# Patient Record
Sex: FEMALE | Race: BLACK OR AFRICAN AMERICAN | NOT HISPANIC OR LATINO | URBAN - METROPOLITAN AREA
[De-identification: names, ages, dates, MRNs, and addresses within clinical notes are randomized per-mention and may not be internally consistent; named-entity substitution may affect disease eponyms.]

---

## 2019-07-15 ENCOUNTER — OUTPATIENT (OUTPATIENT)
Dept: OUTPATIENT SERVICES | Facility: HOSPITAL | Age: 64
LOS: 1 days | Discharge: HOME | End: 2019-07-15
Payer: COMMERCIAL

## 2019-07-15 DIAGNOSIS — Z90.710 ACQUIRED ABSENCE OF BOTH CERVIX AND UTERUS: Chronic | ICD-10-CM

## 2019-07-15 LAB
ANION GAP SERPL CALC-SCNC: 10 MMO/L — SIGNIFICANT CHANGE UP (ref 7–14)
BUN SERPL-MCNC: 11 MG/DL — SIGNIFICANT CHANGE UP (ref 7–23)
CALCIUM SERPL-MCNC: 9.4 MG/DL — SIGNIFICANT CHANGE UP (ref 8.4–10.5)
CHLORIDE SERPL-SCNC: 102 MMOL/L — SIGNIFICANT CHANGE UP (ref 98–107)
CO2 SERPL-SCNC: 28 MMOL/L — SIGNIFICANT CHANGE UP (ref 22–31)
CREAT SERPL-MCNC: 0.71 MG/DL — SIGNIFICANT CHANGE UP (ref 0.5–1.3)
GLUCOSE SERPL-MCNC: 99 MG/DL — SIGNIFICANT CHANGE UP (ref 70–99)
HBA1C BLD-MCNC: 5.5 % — SIGNIFICANT CHANGE UP (ref 4–5.6)
HCT VFR BLD CALC: 39.8 % — SIGNIFICANT CHANGE UP (ref 34.5–45)
HGB BLD-MCNC: 13.2 G/DL — SIGNIFICANT CHANGE UP (ref 11.5–15.5)
MCHC RBC-ENTMCNC: 30.4 PG — SIGNIFICANT CHANGE UP (ref 27–34)
MCHC RBC-ENTMCNC: 33.2 % — SIGNIFICANT CHANGE UP (ref 32–36)
MCV RBC AUTO: 91.7 FL — SIGNIFICANT CHANGE UP (ref 80–100)
NRBC # FLD: 0 K/UL — SIGNIFICANT CHANGE UP (ref 0–0)
PLATELET # BLD AUTO: 249 K/UL — SIGNIFICANT CHANGE UP (ref 150–400)
PMV BLD: 11.1 FL — SIGNIFICANT CHANGE UP (ref 7–13)
POTASSIUM SERPL-MCNC: 3.8 MMOL/L — SIGNIFICANT CHANGE UP (ref 3.5–5.3)
POTASSIUM SERPL-SCNC: 3.8 MMOL/L — SIGNIFICANT CHANGE UP (ref 3.5–5.3)
RBC # BLD: 4.34 M/UL — SIGNIFICANT CHANGE UP (ref 3.8–5.2)
RBC # FLD: 12.8 % — SIGNIFICANT CHANGE UP (ref 10.3–14.5)
SODIUM SERPL-SCNC: 140 MMOL/L — SIGNIFICANT CHANGE UP (ref 135–145)
WBC # BLD: 7.87 K/UL — SIGNIFICANT CHANGE UP (ref 3.8–10.5)
WBC # FLD AUTO: 7.87 K/UL — SIGNIFICANT CHANGE UP (ref 3.8–10.5)

## 2019-07-15 PROCEDURE — 93010 ELECTROCARDIOGRAM REPORT: CPT

## 2019-07-15 RX ORDER — APIXABAN 2.5 MG/1
5 TABLET, FILM COATED ORAL EVERY 12 HOURS
Refills: 0 | Status: DISCONTINUED | OUTPATIENT
Start: 2019-07-15 | End: 2019-07-30

## 2019-07-15 RX ORDER — SODIUM CHLORIDE 9 MG/ML
3 INJECTION INTRAMUSCULAR; INTRAVENOUS; SUBCUTANEOUS EVERY 8 HOURS
Refills: 0 | Status: DISCONTINUED | OUTPATIENT
Start: 2019-07-15 | End: 2019-07-30

## 2019-07-15 RX ORDER — ASPIRIN/CALCIUM CARB/MAGNESIUM 324 MG
324 TABLET ORAL ONCE
Refills: 0 | Status: COMPLETED | OUTPATIENT
Start: 2019-07-15 | End: 2019-07-15

## 2019-07-15 RX ADMIN — APIXABAN 5 MILLIGRAM(S): 2.5 TABLET, FILM COATED ORAL at 14:56

## 2019-07-15 RX ADMIN — Medication 324 MILLIGRAM(S): at 09:36

## 2019-07-15 NOTE — H&P CARDIOLOGY - COMMENTS
will give chewable ASA 324mg po x1 as patient does not take regularly will give chewable ASA 324mg po x1 as patient does not take regularly and has been off Eliquis since Thursday in anticipation for cath

## 2019-07-15 NOTE — H&P CARDIOLOGY - HISTORY OF PRESENT ILLNESS
63 year old female with GERD, diagnosed PE June 2019 at [facility] (on Eliquis) who presented to her PMD for routine follow up and referred to a Cardiologist. Admits to   Denies chest pain, dizziness, syncope, edema, orthopnea and PND. Admits to increase in cough, with white sputum, denies fever, chills, sick contacts. Underwent a Stress test with reported inferoseptal ischemia.   In light of patients cardiac risk factors and abnormal noninvasive test findings there is high suspicion for CAD. Patient is now referred to [facility] for a cardiac catheterization with possible PTCA/stent. 63 year old hatian creole speaking female with GERD, diagnosed PE June 2019 at [facility] (on Eliquis) who presented to her PMD for routine follow up and referred to a Cardiologist. Admits to chronic cough with white sputum. Denies fever, chills, sick contacts. Underwent a Stress test with reported inferoseptal ischemia.   Denies chest pain, SOB, dizziness, syncope, edema, orthopnea and PND.   In light of patients cardiac risk factors and abnormal noninvasive test findings there is high suspicion for CAD. Patient is now referred to [facility] for a cardiac catheterization with possible PTCA/stent.

## 2019-07-15 NOTE — H&P CARDIOLOGY - PMH
Asthma    GERD (gastroesophageal reflux disease)    Pulmonary embolism  diagnosed June 12, 2019 at [facility] and started on Eliquis

## 2019-07-15 NOTE — CHART NOTE - NSCHARTNOTEFT_GEN_A_CORE
case reviewed with Dr Mckinley and plan for dose of Eliquis 5mg po x1 prior to discharge. TO be given after RRB removed and site stable without bleed.   Patient will continue with BID dosing as prescribed and follow up with her outpatient cardiologist as directed or sooner if needed

## 2019-07-16 RX ORDER — PYRIDOXINE HCL (VITAMIN B6) 100 MG
0 TABLET ORAL
Qty: 0 | Refills: 0 | DISCHARGE

## 2019-07-16 RX ORDER — BUDESONIDE AND FORMOTEROL FUMARATE DIHYDRATE 160; 4.5 UG/1; UG/1
2 AEROSOL RESPIRATORY (INHALATION)
Qty: 0 | Refills: 0 | DISCHARGE

## 2019-07-16 RX ORDER — OMEPRAZOLE 10 MG/1
1 CAPSULE, DELAYED RELEASE ORAL
Qty: 0 | Refills: 0 | DISCHARGE

## 2019-07-16 RX ORDER — PREGABALIN 225 MG/1
1 CAPSULE ORAL
Qty: 0 | Refills: 0 | DISCHARGE

## 2019-07-16 RX ORDER — APIXABAN 2.5 MG/1
1 TABLET, FILM COATED ORAL
Qty: 0 | Refills: 0 | DISCHARGE

## 2019-07-16 RX ORDER — FOLIC ACID 0.8 MG
0 TABLET ORAL
Qty: 0 | Refills: 0 | DISCHARGE

## 2019-07-16 RX ORDER — PREGABALIN 225 MG/1
0 CAPSULE ORAL
Qty: 0 | Refills: 0 | DISCHARGE

## 2019-07-16 RX ORDER — PYRIDOXINE HCL (VITAMIN B6) 100 MG
1 TABLET ORAL
Qty: 0 | Refills: 0 | DISCHARGE

## 2022-09-29 ENCOUNTER — EMERGENCY (EMERGENCY)
Facility: HOSPITAL | Age: 67
LOS: 0 days | Discharge: HOME | End: 2022-09-30
Attending: EMERGENCY MEDICINE

## 2022-09-29 VITALS
SYSTOLIC BLOOD PRESSURE: 110 MMHG | DIASTOLIC BLOOD PRESSURE: 76 MMHG | TEMPERATURE: 99 F | RESPIRATION RATE: 17 BRPM | OXYGEN SATURATION: 97 % | WEIGHT: 136.03 LBS | HEART RATE: 77 BPM

## 2022-09-29 DIAGNOSIS — M25.551 PAIN IN RIGHT HIP: ICD-10-CM

## 2022-09-29 PROCEDURE — 73502 X-RAY EXAM HIP UNI 2-3 VIEWS: CPT | Mod: 26,RT

## 2022-09-29 PROCEDURE — 93971 EXTREMITY STUDY: CPT | Mod: 26,RT

## 2022-09-29 PROCEDURE — 73562 X-RAY EXAM OF KNEE 3: CPT | Mod: 26,RT

## 2022-09-29 PROCEDURE — 99284 EMERGENCY DEPT VISIT MOD MDM: CPT

## 2022-09-29 RX ORDER — KETOROLAC TROMETHAMINE 30 MG/ML
15 SYRINGE (ML) INJECTION ONCE
Refills: 0 | Status: DISCONTINUED | OUTPATIENT
Start: 2022-09-29 | End: 2022-09-29

## 2022-09-29 RX ADMIN — Medication 15 MILLIGRAM(S): at 20:46

## 2022-09-29 NOTE — ED PROVIDER NOTE - CLINICAL SUMMARY MEDICAL DECISION MAKING FREE TEXT BOX
pt with hip pain on R otherwise well appearing, moving joint well after toradol - will dc home with naproxen.

## 2022-09-29 NOTE — ED PROVIDER NOTE - NSFOLLOWUPINSTRUCTIONS_ED_ALL_ED_FT
Arthritis    WHAT YOU NEED TO KNOW:    Arthritis is pain or disease in one or more joints. There are many types of arthritis. Types such as rheumatoid arthritis cause inflammation in the joints. Other types wear away the cartilage between joints, such as osteoarthritis. This makes the bones of the joint rub together when you move the joint. An infection from bacteria, a virus, or a fungus can also cause arthritis. Your symptoms may be constant, or symptoms may come and go. Arthritis often gets worse over time and can cause permanent joint damage.    DISCHARGE INSTRUCTIONS:    Call your doctor or rheumatologist if:   •You have a fever and severe joint pain or swelling.      •You cannot move the affected joint.      •You have severe joint pain you cannot tolerate.      •You have a new or worsening rash.      •Your pain or swelling does not get better with treatment.      •You have questions or concerns about your condition or care.      Medicines:   •Acetaminophen decreases pain and fever. It is available without a doctor's order. Ask how much to take and how often to take it. Follow directions. Read the labels of all other medicines you are using to see if they also contain acetaminophen, or ask your doctor or pharmacist. Acetaminophen can cause liver damage if not taken correctly.      •NSAIDs, such as ibuprofen, help decrease swelling, pain, and fever. This medicine is available with or without a doctor's order. NSAIDs can cause stomach bleeding or kidney problems in certain people. If you take blood thinner medicine, always ask your healthcare provider if NSAIDs are safe for you. Always read the medicine label and follow directions.      •Steroids reduce swelling and pain.      •Prescription pain medicine may be given. Ask your healthcare provider how to take this medicine safely. Some prescription pain medicines contain acetaminophen. Do not take other medicines that contain acetaminophen without talking to your healthcare provider. Too much acetaminophen may cause liver damage. Prescription pain medicine may cause constipation. Ask your healthcare provider how to prevent or treat constipation.       •Take your medicine as directed. Contact your healthcare provider if you think your medicine is not helping or if you have side effects. Tell your provider if you are allergic to any medicine. Keep a list of the medicines, vitamins, and herbs you take. Include the amounts, and when and why you take them. Bring the list or the pill bottles to follow-up visits. Carry your medicine list with you in case of an emergency.      Manage your symptoms:   •Rest your painful joint so it can heal. Your healthcare provider may recommend crutches or a walker if the affected joint is in a leg.      •Apply ice or heat to the joint. Both can help decrease swelling and pain. Ice may also help prevent tissue damage. Use an ice pack, or put crushed ice in a plastic bag. Cover it with a towel and place it on your joint for 15 to 20 minutes every hour or as directed. You can apply heat for 20 minutes every 2 hours. Heat treatment includes hot packs or heat lamps.      •Elevate your joint. Elevation helps reduce swelling and pain. Raise your joint above the level of your heart as often as you can. Prop your painful joint on pillows to keep it above your heart comfortably.  Elevate Leg           Manage arthritis:   •Talk to your healthcare providers about your arthritis medicines. Some medicines may only be needed when you have arthritis pain. You may need to take other medicines every day to prevent arthritis from getting worse. Your healthcare providers will help you understand all your medicines and when to take them. It is important to take the medicines as directed, even if you start to feel better. You can continue to have joint damage and inflammation even if you do not feel it.      •Eat a variety of healthy foods. Healthy foods include fruits, vegetables, whole-grain breads, low-fat dairy products, beans, lean meats, and fish. Ask if you need to be on a special diet. A diet rich in calcium and vitamin D may decrease your risk of osteoporosis. Foods high in calcium include milk, cheese, broccoli, and tofu. Vitamin D may be found in meat, fish, fortified milk, cereal and bread. Ask if you need calcium or vitamin D supplements.         Healthy Foods           •Go to physical or occupational therapy as directed. A physical therapist can teach you exercises to improve flexibility and range of motion. You may also be shown non-weight-bearing exercises that are safe for your joints, such as swimming. Exercise can help keep your joints flexible and reduce pain. An occupational therapist can help you learn to do your daily activities when your joints are stiff or sore.      •Maintain a healthy weight. Extra weight puts increased pressure on your joints. Ask your healthcare provider what you should weigh. If you need to lose weight, he or she can help you create a weight loss program. Weight loss can help reduce pain and increase your ability to do your activities.      •Wear flat or low-heeled shoes. This will help decrease pain and reduce pressure on your ankle, knee, and hip joints.      •Do not smoke. Nicotine and other chemicals in cigarettes and cigars can damage your bones and joints. Ask your healthcare provider for information if you currently smoke and need help to quit. E-cigarettes or smokeless tobacco still contain nicotine. Talk to your healthcare provider before you use these products.       Support devices:   •Orthotic shoes or insoles help support your feet when you walk.      •Crutches, a cane, or a walker may help decrease your risk for falling. They also decrease stress on affected joints.      •Devices to prevent falls include raised toilet seats and bathtub bars to help you get up from sitting. Handrails can be placed in areas where you need balance and support.  Fall Prevention for Adults           •Devices to help with support and rest include splints to wear on your hands and a firm pillow while you sleep. Use a pillow that is firm enough to support your neck and head.      Follow up with your healthcare provider or rheumatologist as directed: Write down your questions so you remember to ask them during your visits.

## 2022-09-29 NOTE — ED ADULT TRIAGE NOTE - WILL THE PATIENT ACCEPT THE PFIZER COVID-19 VACCINE IF ELIGIBLE AND IT IS AVAILABLE?
I tried calling the number twice, the extension would not connect and then I held for a few minutes to wait for a  to connect, but its taking a long time  It ok for her to use brand, pt had used before  And she prefers it over the generic  I printed her RX's and gave them to her  She gets rash and the generic falls off easily she stated  Can you please call them and let them know that, thank you! No

## 2022-09-29 NOTE — ED ADULT NURSE NOTE - OBJECTIVE STATEMENT
pt c/o right leg pain and numbness, states she has been in PT for the same and the pain is getting worse. Denies SOB/chest pain. States she was sent to PT by physical medicine doctor and hasn't been given any meds for her pain. Pt denies back pain.

## 2022-09-29 NOTE — ED ADULT TRIAGE NOTE - CHIEF COMPLAINT QUOTE
BIBA as per EMS pt c/o R leg pain and difficulty ambulating x 2 months. Seeing PCP and PT, states pain has not improved  Denies injury or trauma.

## 2022-09-29 NOTE — ED PROVIDER NOTE - PATIENT PORTAL LINK FT
You can access the FollowMyHealth Patient Portal offered by [facility] by registering at the following website: [email or web address]. By joining [employer]’s FollowMyHealth portal, you will also be able to view your health information using other applications (apps) compatible with our system.

## 2022-09-29 NOTE — ED PROVIDER NOTE - OBJECTIVE STATEMENT
67 year old female presenting to ED for r/o DVT due to pain to R hip area x 2 months. Otherwise pt denies any recent falls and has been able to ambulate on leg otherwise. No significant leg swelling noted either.

## 2022-09-29 NOTE — ED ADULT NURSE NOTE - BREATHING, MLM
What Type Of Note Output Would You Prefer (Optional)?: Bullet Format
How Severe Is Your Acne?: mild
Is This A New Presentation, Or A Follow-Up?: Follow Up Acne
Spontaneous, unlabored and symmetrical
Additional Comments (Use Complete Sentences): Patient D/C clindamycin gel due to dryness. She only takes the oral ABX(doxy) when she has large breakouts. She hasn’t Needed it in a month.

## 2022-09-29 NOTE — ED ADULT NURSE NOTE - WILL THE PATIENT ACCEPT THE PFIZER COVID-19 VACCINE IF ELIGIBLE AND IT IS AVAILABLE?
No 1/2022 post-op after right partial mastectomy; denies family h/o anesthesia issues/nausea/vomiting N/V 1/2022 post-op after right partial mastectomy; denies family h/o anesthesia issues/nausea/vomiting

## 2022-09-29 NOTE — ED PROVIDER NOTE - CARE PROVIDER_API CALL
Chandana Bah (DO)  Orthopaedic Surgery  [address] [place] [address]  Phone: [phone]  Fax: [phone]  Follow Up Time: 1-3 Days

## 2022-09-29 NOTE — ED ADULT NURSE NOTE - NSIMPLEMENTINTERV_GEN_ALL_ED
Implemented All Fall Risk Interventions:  [place] to call system. Call bell, personal items and telephone within reach. Instruct patient to call for assistance. Room bathroom lighting operational. Non-slip footwear when patient is off stretcher. Physically safe environment: no spills, clutter or unnecessary equipment. Stretcher in lowest position, wheels locked, appropriate side rails in place. Provide visual cue, wrist band, yellow gown, etc. Monitor gait and stability. Monitor for mental status changes and reorient to person, place, and time. Review medications for side effects contributing to fall risk. Reinforce activity limits and safety measures with patient and family.

## 2022-09-29 NOTE — ED PROVIDER NOTE - ATTENDING CONTRIBUTION TO CARE
Patient evaluated and seen with CHANCE Anaya agree with above history and physical - pt examined and seen by me personally - findings as seen: Pt with R hip pain otherwise xray and US neg for acute pathology, noted some arthritis - will dc with naproxen and ortho follow up as needed

## 2022-09-29 NOTE — ED ADULT TRIAGE NOTE - SOURCE OF INFORMATION
DISPLAY PLAN FREE TEXT DISPLAY PLAN FREE TEXT DISPLAY PLAN FREE TEXT DISPLAY PLAN FREE TEXT DISPLAY PLAN FREE TEXT DISPLAY PLAN FREE TEXT DISPLAY PLAN FREE TEXT DISPLAY PLAN FREE TEXT DISPLAY PLAN FREE TEXT DISPLAY PLAN FREE TEXT DISPLAY PLAN FREE TEXT DISPLAY PLAN FREE TEXT DISPLAY PLAN FREE TEXT DISPLAY PLAN FREE TEXT DISPLAY PLAN FREE TEXT DISPLAY PLAN FREE TEXT Patient/EMS DISPLAY PLAN FREE TEXT

## 2022-09-30 VITALS
RESPIRATION RATE: 13 BRPM | DIASTOLIC BLOOD PRESSURE: 79 MMHG | OXYGEN SATURATION: 99 % | SYSTOLIC BLOOD PRESSURE: 105 MMHG | HEART RATE: 81 BPM

## 2022-09-30 RX ORDER — KETOROLAC TROMETHAMINE 30 MG/ML
15 SYRINGE (ML) INJECTION ONCE
Refills: 0 | Status: DISCONTINUED | OUTPATIENT
Start: 2022-09-30 | End: 2022-09-30

## 2022-09-30 RX ADMIN — Medication 15 MILLIGRAM(S): at 00:45

## 2022-09-30 NOTE — ED ADULT NURSE REASSESSMENT NOTE - PATIENT ON (OXYGEN DELIVERY METHOD)
room air
room air
return to ED if symptoms worsen, persist or questions arise/need for outpatient follow-up/lab results

## 2022-12-07 ENCOUNTER — EMERGENCY (EMERGENCY)
Facility: HOSPITAL | Age: 67
LOS: 0 days | Discharge: HOME | End: 2022-12-07
Attending: EMERGENCY MEDICINE

## 2022-12-07 VITALS
RESPIRATION RATE: 18 BRPM | OXYGEN SATURATION: 97 % | TEMPERATURE: 98 F | HEART RATE: 69 BPM | DIASTOLIC BLOOD PRESSURE: 63 MMHG | SYSTOLIC BLOOD PRESSURE: 112 MMHG

## 2022-12-07 VITALS
DIASTOLIC BLOOD PRESSURE: 88 MMHG | WEIGHT: 113.1 LBS | HEIGHT: 60 IN | TEMPERATURE: 98 F | RESPIRATION RATE: 18 BRPM | HEART RATE: 72 BPM | SYSTOLIC BLOOD PRESSURE: 131 MMHG | OXYGEN SATURATION: 98 %

## 2022-12-07 DIAGNOSIS — M79.604 PAIN IN RIGHT LEG: ICD-10-CM

## 2022-12-07 DIAGNOSIS — Z90.710 ACQUIRED ABSENCE OF BOTH CERVIX AND UTERUS: Chronic | ICD-10-CM

## 2022-12-07 DIAGNOSIS — M51.36 OTHER INTERVERTEBRAL DISC DEGENERATION, LUMBAR REGION: ICD-10-CM

## 2022-12-07 DIAGNOSIS — M54.9 DORSALGIA, UNSPECIFIED: ICD-10-CM

## 2022-12-07 LAB
ALBUMIN SERPL ELPH-MCNC: 3.2 G/DL — LOW (ref 3.3–5)
ALP SERPL-CCNC: 95 U/L — SIGNIFICANT CHANGE UP (ref 40–120)
ALT FLD-CCNC: 30 U/L — SIGNIFICANT CHANGE UP (ref 12–78)
ANION GAP SERPL CALC-SCNC: 5 MMOL/L — SIGNIFICANT CHANGE UP (ref 5–17)
AST SERPL-CCNC: 18 U/L — SIGNIFICANT CHANGE UP (ref 15–37)
BASOPHILS # BLD AUTO: 0.06 K/UL — SIGNIFICANT CHANGE UP (ref 0–0.2)
BASOPHILS NFR BLD AUTO: 0.7 % — SIGNIFICANT CHANGE UP (ref 0–2)
BILIRUB SERPL-MCNC: 0.4 MG/DL — SIGNIFICANT CHANGE UP (ref 0.2–1.2)
BLD GP AB SCN SERPL QL: SIGNIFICANT CHANGE UP
BUN SERPL-MCNC: 13 MG/DL — SIGNIFICANT CHANGE UP (ref 7–23)
CALCIUM SERPL-MCNC: 9.3 MG/DL — SIGNIFICANT CHANGE UP (ref 8.5–10.1)
CHLORIDE SERPL-SCNC: 107 MMOL/L — SIGNIFICANT CHANGE UP (ref 96–108)
CO2 SERPL-SCNC: 29 MMOL/L — SIGNIFICANT CHANGE UP (ref 22–31)
CREAT SERPL-MCNC: 0.74 MG/DL — SIGNIFICANT CHANGE UP (ref 0.5–1.3)
EGFR: 89 ML/MIN/1.73M2 — SIGNIFICANT CHANGE UP
EOSINOPHIL # BLD AUTO: 0.27 K/UL — SIGNIFICANT CHANGE UP (ref 0–0.5)
EOSINOPHIL NFR BLD AUTO: 3.1 % — SIGNIFICANT CHANGE UP (ref 0–6)
GLUCOSE SERPL-MCNC: 131 MG/DL — HIGH (ref 70–99)
HCT VFR BLD CALC: 39 % — SIGNIFICANT CHANGE UP (ref 34.5–45)
HGB BLD-MCNC: 13.1 G/DL — SIGNIFICANT CHANGE UP (ref 11.5–15.5)
IMM GRANULOCYTES NFR BLD AUTO: 0.2 % — SIGNIFICANT CHANGE UP (ref 0–0.9)
INR BLD: 1.12 RATIO — SIGNIFICANT CHANGE UP (ref 0.88–1.16)
LYMPHOCYTES # BLD AUTO: 3.15 K/UL — SIGNIFICANT CHANGE UP (ref 1–3.3)
LYMPHOCYTES # BLD AUTO: 35.7 % — SIGNIFICANT CHANGE UP (ref 13–44)
MCHC RBC-ENTMCNC: 31.3 PG — SIGNIFICANT CHANGE UP (ref 27–34)
MCHC RBC-ENTMCNC: 33.6 G/DL — SIGNIFICANT CHANGE UP (ref 32–36)
MCV RBC AUTO: 93.1 FL — SIGNIFICANT CHANGE UP (ref 80–100)
MONOCYTES # BLD AUTO: 0.98 K/UL — HIGH (ref 0–0.9)
MONOCYTES NFR BLD AUTO: 11.1 % — SIGNIFICANT CHANGE UP (ref 2–14)
NEUTROPHILS # BLD AUTO: 4.34 K/UL — SIGNIFICANT CHANGE UP (ref 1.8–7.4)
NEUTROPHILS NFR BLD AUTO: 49.2 % — SIGNIFICANT CHANGE UP (ref 43–77)
NRBC # BLD: 0 /100 WBCS — SIGNIFICANT CHANGE UP (ref 0–0)
PLATELET # BLD AUTO: 233 K/UL — SIGNIFICANT CHANGE UP (ref 150–400)
POTASSIUM SERPL-MCNC: 3.3 MMOL/L — LOW (ref 3.5–5.3)
POTASSIUM SERPL-SCNC: 3.3 MMOL/L — LOW (ref 3.5–5.3)
PROT SERPL-MCNC: 7.4 GM/DL — SIGNIFICANT CHANGE UP (ref 6–8.3)
PROTHROM AB SERPL-ACNC: 13.5 SEC — HIGH (ref 10.5–13.4)
RBC # BLD: 4.19 M/UL — SIGNIFICANT CHANGE UP (ref 3.8–5.2)
RBC # FLD: 12.9 % — SIGNIFICANT CHANGE UP (ref 10.3–14.5)
SODIUM SERPL-SCNC: 141 MMOL/L — SIGNIFICANT CHANGE UP (ref 135–145)
WBC # BLD: 8.82 K/UL — SIGNIFICANT CHANGE UP (ref 3.8–10.5)
WBC # FLD AUTO: 8.82 K/UL — SIGNIFICANT CHANGE UP (ref 3.8–10.5)

## 2022-12-07 PROCEDURE — 72100 X-RAY EXAM L-S SPINE 2/3 VWS: CPT | Mod: 26

## 2022-12-07 PROCEDURE — 72131 CT LUMBAR SPINE W/O DYE: CPT | Mod: 26,MA

## 2022-12-07 PROCEDURE — 99285 EMERGENCY DEPT VISIT HI MDM: CPT

## 2022-12-07 PROCEDURE — 72148 MRI LUMBAR SPINE W/O DYE: CPT | Mod: 26,MA

## 2022-12-07 RX ORDER — OXYCODONE HYDROCHLORIDE 5 MG/1
1 TABLET ORAL
Qty: 20 | Refills: 0
Start: 2022-12-07 | End: 2022-12-11

## 2022-12-07 RX ORDER — GABAPENTIN 400 MG/1
1 CAPSULE ORAL
Qty: 90 | Refills: 0
Start: 2022-12-07 | End: 2023-01-05

## 2022-12-07 RX ORDER — METHOCARBAMOL 500 MG/1
1000 TABLET, FILM COATED ORAL ONCE
Refills: 0 | Status: COMPLETED | OUTPATIENT
Start: 2022-12-07 | End: 2022-12-07

## 2022-12-07 RX ORDER — KETOROLAC TROMETHAMINE 30 MG/ML
60 SYRINGE (ML) INJECTION ONCE
Refills: 0 | Status: DISCONTINUED | OUTPATIENT
Start: 2022-12-07 | End: 2022-12-07

## 2022-12-07 RX ORDER — MORPHINE SULFATE 50 MG/1
4 CAPSULE, EXTENDED RELEASE ORAL ONCE
Refills: 0 | Status: DISCONTINUED | OUTPATIENT
Start: 2022-12-07 | End: 2022-12-07

## 2022-12-07 RX ADMIN — MORPHINE SULFATE 4 MILLIGRAM(S): 50 CAPSULE, EXTENDED RELEASE ORAL at 16:59

## 2022-12-07 RX ADMIN — MORPHINE SULFATE 4 MILLIGRAM(S): 50 CAPSULE, EXTENDED RELEASE ORAL at 13:22

## 2022-12-07 RX ADMIN — METHOCARBAMOL 1000 MILLIGRAM(S): 500 TABLET, FILM COATED ORAL at 09:26

## 2022-12-07 RX ADMIN — Medication 60 MILLIGRAM(S): at 09:26

## 2022-12-07 RX ADMIN — Medication 60 MILLIGRAM(S): at 16:59

## 2022-12-07 NOTE — ED PROVIDER NOTE - PATIENT PORTAL LINK FT
You can access the FollowMyHealth Patient Portal offered by [facility] by registering at the following website: [email or web address]. By joining [employer]’s FollowMyHealth portal, you will also be able to view your health information using other applications (apps) compatible with our system.

## 2022-12-07 NOTE — ED ADULT NURSE NOTE - OBJECTIVE STATEMENT
Patient BIBA [facility] c/o pain to right hip/ buttocks that radiates down right leg. Denies injury. Denies loss of bladder and bowel function. No unilateral weakness. Pmh sciatica. asthma.

## 2022-12-07 NOTE — ED PROVIDER NOTE - CLINICAL SUMMARY MEDICAL DECISION MAKING FREE TEXT BOX
Leg and back pain with weakness and numbness.  Concern for herniated disc and radiculopathy.  Will give medication for pain, check Xray, CT and Re-eval.  If patient with no improvement with movement and able to stand better or ambulate after pain medication consult Ortho.

## 2022-12-07 NOTE — ED PROVIDER NOTE - SKIN, MLM
Skin normal color for race, warm, dry and intact. No evidence of rash. Skin normal color for race, warm, dry and intact. No evidence of trauma.

## 2022-12-07 NOTE — ED PROVIDER NOTE - NSFOLLOWUPINSTRUCTIONS_ED_ALL_ED_FT
1) Take tylenol or motrin/ibuprofen for pain  2) Take prescription medication as instructed  3) Follow-up with Ortho spine.  Call for an appointment  4) Follow up with your primary care doctor  5) Return to the ER for worsening or concerning symptoms

## 2022-12-07 NOTE — ED PROVIDER NOTE - CARE PLAN
1 Principal Discharge DX:	Back pain   Principal Discharge DX:	Back pain  Secondary Diagnosis:	Pain in right leg

## 2022-12-07 NOTE — CONSULT NOTE ADULT - SUBJECTIVE AND OBJECTIVE BOX
Patient is a 67y Female who presents c/o RLE radicular pain. Pt reports the pain started suddenly in July. Denies trauma or any injury. Reports increasing difficulty to ambulate 2/2 pain. Pt normally ambulates without assistance at baseline. Reports numbness/tingling/weakness in RLE. Denies bowel/bladder incontinence. Denies cancer history. Denies fevers/chills. Pt reports trying physical therapy initially which did not help and being seen by Dr. Clark Tariq 2 weeks ago who ordered lumbar xrays and an MRI which was scheduled for next week. Pt has no other complaints at this time.    HEALTH ISSUES - PROBLEM Dx:          MEDICATIONS  (STANDING):      Allergies    No Known Allergies    Intolerances        PAST MEDICAL & SURGICAL HISTORY:                            13.1   8.82  )-----------( 233      ( 07 Dec 2022 13:15 )             39.0       07 Dec 2022 13:15    141    |  107    |  13     ----------------------------<  131    3.3     |  29     |  0.74     Ca    9.3        07 Dec 2022 13:15    TPro  7.4    /  Alb  3.2    /  TBili  0.4    /  DBili  x      /  AST  18     /  ALT  30     /  AlkPhos  95     07 Dec 2022 13:15      PT/INR - ( 07 Dec 2022 13:15 )   PT: 13.5 sec;   INR: 1.12 ratio                 Vital Signs Last 24 Hrs  T(C): 36.8 (12-07-22 @ 16:56), Max: 36.8 (12-07-22 @ 16:56)  T(F): 98.2 (12-07-22 @ 16:56), Max: 98.2 (12-07-22 @ 16:56)  HR: 69 (12-07-22 @ 16:56) (67 - 72)  BP: 112/63 (12-07-22 @ 16:56) (105/78 - 131/88)  BP(mean): --  RR: 18 (12-07-22 @ 16:56) (18 - 18)  SpO2: 97% (12-07-22 @ 16:56) (96% - 98%)    Physical Exam:  Gen: NAD  Spine:  Skin intact  No gross deformity  No midline TTP C/T/L/S spine  No bony step offs  No paraspinal muscle ttp/hypertonicity   Positive Straight leg raise on right  Negative clonus  Negative babinski  Negative leon    Motor:                   C5                C6              C7               C8           T1   R            5/5                5/5            5/5             5/5          5/5  L             5/5               5/5             5/5             5/5          5/5                L2             L3             L4               L5            S1  R         5/5           5/5          5/5             5/5           5/5  L          5/5          5/5           5/5             5/5           5/5    Sensory:            C5         C6         C7      C8       T1        (0=absent, 1=impaired, 2=normal, NT=not testable)  R         2            2           2        2         2  L          2            2           2        2         2               L2          L3         L4      L5       S1         (0=absent, 1=impaired, 2=normal, NT=not testable)  R         1            1            1        1        1  L          2            2           2        2         2    Imaging:   MR Lsp: moderate central canal stenosis at L4-S1    A/P: 67y Female with RLE radicular back pain 2/2 moderate central canal stenosis L4-S1    Pt was advised of MRI findings and offered surgical treatment to relieve her symptoms  Operative and nonoperative treatments were discussed with the patient and her  on the phone and all questions were answered  Patient decided that she would like to have some time to discuss with family and does not want to make a decision today  Pt can follow up outpatient with Dr. Ernst to further discuss surgical treatment options once she has made her decision  DC on medrol dose pack, gabapentin 300mg bid  Pain control prn  WBAT with assistive devices as needed  Will discuss with Dr. Ernst and advise with updates to plan

## 2022-12-07 NOTE — ED ADULT NURSE NOTE - NSIMPLEMENTINTERV_GEN_ALL_ED
Implemented All Fall Risk Interventions:  [place] to call system. Call bell, personal items and telephone within reach. Instruct patient to call for assistance. Room bathroom lighting operational. Non-slip footwear when patient is off stretcher. Physically safe environment: no spills, clutter or unnecessary equipment. Stretcher in lowest position, wheels locked, appropriate side rails in place. Provide visual cue, wrist band, yellow gown, etc. Monitor gait and stability. Monitor for mental status changes and reorient to person, place, and time. Review medications for side effects contributing to fall risk. Reinforce activity limits and safety measures with patient and family.

## 2022-12-07 NOTE — ED ADULT NURSE REASSESSMENT NOTE - NS ED NURSE REASSESS COMMENT FT1
Received patient aox3 c/o rt hip pain radiates to rt buttock x 1 week worsening last night has history of Sciatica denies injury.

## 2022-12-07 NOTE — ED ADULT NURSE REASSESSMENT NOTE - NS ED NURSE REASSESS COMMENT FT1
Patient aox3 discharged home all discharge instructions given encouraged to follow up with MDs appointment , left ER ambulatory in no acute distress/

## 2022-12-07 NOTE — ED PROVIDER NOTE - PROGRESS NOTE DETAILS
Patient in severe pain unable to ambulate secondary to pain.  Ortho paged. Ortho aware. Patient was offered surgery by Ortho but declined.  Ortho team recommends medrol dosepack, gabapentin and pain meds.  Patient to follow-up outpatient.

## 2022-12-07 NOTE — ED ADULT TRIAGE NOTE - CHIEF COMPLAINT QUOTE
Patient BIB [facility] c/o pain to right hip/ buttocks that radiates down right leg. Pmh sciatica. asthma.

## 2022-12-07 NOTE — ED PROVIDER NOTE - CROS ED ROS STATEMENT
-- DO NOT REPLY / DO NOT REPLY ALL --  -- Message is from Engagement Center Operations (ECO) --    Provider paged via [place] Documentation - The below message was copied and pasted from a [place] page:    Initiated Date/Time  11/20/2022 8:12 pm  Message Sent Date/Time  11/20/2022 8:13 pm  Source  [facility] Contact Center  Department  [facility]  Phone Number  [phone]  Method  Secure Text  Contacted  Bindu Qiu MD  Requested  Catrachita Lester MD  Details  Patient Patient seen by Specialist  Message  [phone] [facility] URGENT CALLER NAME: ABBY REQUESTED PHYSICIAN: CATRACHITA LESTER RE: RAY PAEZ PATIENT 2011 MRN: [id number] PATIENT PCP: DR. LESTER IF RX, PHARMACY #: N/A MOM REQUESTED TO PAGE PROVIDER; PATIENT TESTED + FOR COVID AND HAS UNDERLYING CONDITION, MAY HAVE RESPIRATORY COMPLICATIONS. WOULD LIKE TO DISCUSS HOW HANDLE. JMONA**    Route encounter to 'Provider On-Call' clinical support pool that was paged. 'Sign and Close Workspace'   all other ROS negative except as per HPI

## 2022-12-07 NOTE — ED PROVIDER NOTE - OBJECTIVE STATEMENT
This patient is a 67 year old woman who presents to the ER c/o right sided lag and back pain x 1 week that has been getting progressively worse.  Patient states that this morning she was unable to move around and ambulate secondary to pain.  She denies urinary retention, fecal incontinence, and fever.  No falls or injury reported.

## 2023-02-11 ENCOUNTER — EMERGENCY (EMERGENCY)
Facility: HOSPITAL | Age: 68
LOS: 0 days | Discharge: HOME | End: 2023-02-11
Attending: EMERGENCY MEDICINE
Payer: MEDICARE

## 2023-02-11 VITALS
DIASTOLIC BLOOD PRESSURE: 96 MMHG | HEART RATE: 70 BPM | SYSTOLIC BLOOD PRESSURE: 146 MMHG | RESPIRATION RATE: 18 BRPM | HEIGHT: 60 IN | WEIGHT: 136.03 LBS | TEMPERATURE: 99 F | OXYGEN SATURATION: 99 %

## 2023-02-11 VITALS
DIASTOLIC BLOOD PRESSURE: 67 MMHG | HEART RATE: 72 BPM | SYSTOLIC BLOOD PRESSURE: 132 MMHG | OXYGEN SATURATION: 98 % | TEMPERATURE: 98 F | RESPIRATION RATE: 17 BRPM

## 2023-02-11 DIAGNOSIS — J45.909 UNSPECIFIED ASTHMA, UNCOMPLICATED: ICD-10-CM

## 2023-02-11 DIAGNOSIS — R25.2 CRAMP AND SPASM: ICD-10-CM

## 2023-02-11 DIAGNOSIS — R53.1 WEAKNESS: ICD-10-CM

## 2023-02-11 DIAGNOSIS — Z20.822 CONTACT WITH AND (SUSPECTED) EXPOSURE TO COVID-19: ICD-10-CM

## 2023-02-11 LAB
ALBUMIN SERPL ELPH-MCNC: 3.3 G/DL — SIGNIFICANT CHANGE UP (ref 3.3–5)
ALP SERPL-CCNC: 87 U/L — SIGNIFICANT CHANGE UP (ref 40–120)
ALT FLD-CCNC: 23 U/L — SIGNIFICANT CHANGE UP (ref 12–78)
ANION GAP SERPL CALC-SCNC: 10 MMOL/L — SIGNIFICANT CHANGE UP (ref 5–17)
APPEARANCE UR: CLEAR — SIGNIFICANT CHANGE UP
APTT BLD: 26.8 SEC — LOW (ref 27.5–35.5)
APTT BLD: 29.8 SEC — SIGNIFICANT CHANGE UP (ref 27.5–35.5)
AST SERPL-CCNC: 23 U/L — SIGNIFICANT CHANGE UP (ref 15–37)
BACTERIA # UR AUTO: NEGATIVE — SIGNIFICANT CHANGE UP
BASOPHILS # BLD AUTO: 0.05 K/UL — SIGNIFICANT CHANGE UP (ref 0–0.2)
BASOPHILS # BLD AUTO: 0.06 K/UL — SIGNIFICANT CHANGE UP (ref 0–0.2)
BASOPHILS NFR BLD AUTO: 0.6 % — SIGNIFICANT CHANGE UP (ref 0–2)
BASOPHILS NFR BLD AUTO: 0.8 % — SIGNIFICANT CHANGE UP (ref 0–2)
BILIRUB SERPL-MCNC: 0.3 MG/DL — SIGNIFICANT CHANGE UP (ref 0.2–1.2)
BILIRUB UR-MCNC: NEGATIVE — SIGNIFICANT CHANGE UP
BUN SERPL-MCNC: 9 MG/DL — SIGNIFICANT CHANGE UP (ref 7–23)
CALCIUM SERPL-MCNC: 9.2 MG/DL — SIGNIFICANT CHANGE UP (ref 8.5–10.1)
CHLORIDE SERPL-SCNC: 107 MMOL/L — SIGNIFICANT CHANGE UP (ref 96–108)
CO2 SERPL-SCNC: 26 MMOL/L — SIGNIFICANT CHANGE UP (ref 22–31)
COLOR SPEC: YELLOW — SIGNIFICANT CHANGE UP
CREAT SERPL-MCNC: 0.55 MG/DL — SIGNIFICANT CHANGE UP (ref 0.5–1.3)
DIFF PNL FLD: NEGATIVE — SIGNIFICANT CHANGE UP
EGFR: 100 ML/MIN/1.73M2 — SIGNIFICANT CHANGE UP
EOSINOPHIL # BLD AUTO: 0.22 K/UL — SIGNIFICANT CHANGE UP (ref 0–0.5)
EOSINOPHIL # BLD AUTO: 0.23 K/UL — SIGNIFICANT CHANGE UP (ref 0–0.5)
EOSINOPHIL NFR BLD AUTO: 2.8 % — SIGNIFICANT CHANGE UP (ref 0–6)
EOSINOPHIL NFR BLD AUTO: 2.9 % — SIGNIFICANT CHANGE UP (ref 0–6)
EPI CELLS # UR: SIGNIFICANT CHANGE UP
FLUAV AG NPH QL: SIGNIFICANT CHANGE UP
FLUBV AG NPH QL: SIGNIFICANT CHANGE UP
GLUCOSE SERPL-MCNC: 96 MG/DL — SIGNIFICANT CHANGE UP (ref 70–99)
GLUCOSE UR QL: NEGATIVE MG/DL — SIGNIFICANT CHANGE UP
HCT VFR BLD CALC: 37.7 % — SIGNIFICANT CHANGE UP (ref 34.5–45)
HCT VFR BLD CALC: 40 % — SIGNIFICANT CHANGE UP (ref 34.5–45)
HGB BLD-MCNC: 12.9 G/DL — SIGNIFICANT CHANGE UP (ref 11.5–15.5)
HGB BLD-MCNC: 13.4 G/DL — SIGNIFICANT CHANGE UP (ref 11.5–15.5)
IMM GRANULOCYTES NFR BLD AUTO: 0.1 % — SIGNIFICANT CHANGE UP (ref 0–0.9)
IMM GRANULOCYTES NFR BLD AUTO: 0.3 % — SIGNIFICANT CHANGE UP (ref 0–0.9)
INR BLD: 1.09 RATIO — SIGNIFICANT CHANGE UP (ref 0.88–1.16)
KETONES UR-MCNC: NEGATIVE — SIGNIFICANT CHANGE UP
LACTATE SERPL-SCNC: 1.8 MMOL/L — SIGNIFICANT CHANGE UP (ref 0.7–2)
LEUKOCYTE ESTERASE UR-ACNC: NEGATIVE — SIGNIFICANT CHANGE UP
LYMPHOCYTES # BLD AUTO: 2.47 K/UL — SIGNIFICANT CHANGE UP (ref 1–3.3)
LYMPHOCYTES # BLD AUTO: 2.61 K/UL — SIGNIFICANT CHANGE UP (ref 1–3.3)
LYMPHOCYTES # BLD AUTO: 31 % — SIGNIFICANT CHANGE UP (ref 13–44)
LYMPHOCYTES # BLD AUTO: 33.5 % — SIGNIFICANT CHANGE UP (ref 13–44)
MCHC RBC-ENTMCNC: 30.9 PG — SIGNIFICANT CHANGE UP (ref 27–34)
MCHC RBC-ENTMCNC: 31.1 PG — SIGNIFICANT CHANGE UP (ref 27–34)
MCHC RBC-ENTMCNC: 33.5 G/DL — SIGNIFICANT CHANGE UP (ref 32–36)
MCHC RBC-ENTMCNC: 34.2 G/DL — SIGNIFICANT CHANGE UP (ref 32–36)
MCV RBC AUTO: 90.2 FL — SIGNIFICANT CHANGE UP (ref 80–100)
MCV RBC AUTO: 92.8 FL — SIGNIFICANT CHANGE UP (ref 80–100)
MONOCYTES # BLD AUTO: 0.68 K/UL — SIGNIFICANT CHANGE UP (ref 0–0.9)
MONOCYTES # BLD AUTO: 0.72 K/UL — SIGNIFICANT CHANGE UP (ref 0–0.9)
MONOCYTES NFR BLD AUTO: 8.7 % — SIGNIFICANT CHANGE UP (ref 2–14)
MONOCYTES NFR BLD AUTO: 9 % — SIGNIFICANT CHANGE UP (ref 2–14)
NEUTROPHILS # BLD AUTO: 4.21 K/UL — SIGNIFICANT CHANGE UP (ref 1.8–7.4)
NEUTROPHILS # BLD AUTO: 4.48 K/UL — SIGNIFICANT CHANGE UP (ref 1.8–7.4)
NEUTROPHILS NFR BLD AUTO: 54 % — SIGNIFICANT CHANGE UP (ref 43–77)
NEUTROPHILS NFR BLD AUTO: 56.3 % — SIGNIFICANT CHANGE UP (ref 43–77)
NITRITE UR-MCNC: NEGATIVE — SIGNIFICANT CHANGE UP
NRBC # BLD: 0 /100 WBCS — SIGNIFICANT CHANGE UP (ref 0–0)
PH UR: 7 — SIGNIFICANT CHANGE UP (ref 5–8)
PLATELET # BLD AUTO: 234 K/UL — SIGNIFICANT CHANGE UP (ref 150–400)
PLATELET # BLD AUTO: 239 K/UL — SIGNIFICANT CHANGE UP (ref 150–400)
POTASSIUM SERPL-MCNC: 3.7 MMOL/L — SIGNIFICANT CHANGE UP (ref 3.5–5.3)
POTASSIUM SERPL-SCNC: 3.7 MMOL/L — SIGNIFICANT CHANGE UP (ref 3.5–5.3)
PROT SERPL-MCNC: 7.3 GM/DL — SIGNIFICANT CHANGE UP (ref 6–8.3)
PROT UR-MCNC: NEGATIVE MG/DL — SIGNIFICANT CHANGE UP
PROTHROM AB SERPL-ACNC: 13.1 SEC — SIGNIFICANT CHANGE UP (ref 10.5–13.4)
RBC # BLD: 4.18 M/UL — SIGNIFICANT CHANGE UP (ref 3.8–5.2)
RBC # BLD: 4.31 M/UL — SIGNIFICANT CHANGE UP (ref 3.8–5.2)
RBC # FLD: 12.3 % — SIGNIFICANT CHANGE UP (ref 10.3–14.5)
RBC # FLD: 12.4 % — SIGNIFICANT CHANGE UP (ref 10.3–14.5)
RBC CASTS # UR COMP ASSIST: SIGNIFICANT CHANGE UP /HPF (ref 0–4)
SARS-COV-2 RNA SPEC QL NAA+PROBE: SIGNIFICANT CHANGE UP
SODIUM SERPL-SCNC: 143 MMOL/L — SIGNIFICANT CHANGE UP (ref 135–145)
SP GR SPEC: 1 — LOW (ref 1.01–1.02)
UROBILINOGEN FLD QL: NEGATIVE MG/DL — SIGNIFICANT CHANGE UP
WBC # BLD: 7.8 K/UL — SIGNIFICANT CHANGE UP (ref 3.8–10.5)
WBC # BLD: 7.96 K/UL — SIGNIFICANT CHANGE UP (ref 3.8–10.5)
WBC # FLD AUTO: 7.8 K/UL — SIGNIFICANT CHANGE UP (ref 3.8–10.5)
WBC # FLD AUTO: 7.96 K/UL — SIGNIFICANT CHANGE UP (ref 3.8–10.5)
WBC UR QL: SIGNIFICANT CHANGE UP

## 2023-02-11 PROCEDURE — 93010 ELECTROCARDIOGRAM REPORT: CPT

## 2023-02-11 PROCEDURE — 99285 EMERGENCY DEPT VISIT HI MDM: CPT

## 2023-02-11 RX ORDER — SODIUM CHLORIDE 9 MG/ML
1000 INJECTION, SOLUTION INTRAVENOUS ONCE
Refills: 0 | Status: COMPLETED | OUTPATIENT
Start: 2023-02-11 | End: 2023-02-11

## 2023-02-11 RX ADMIN — SODIUM CHLORIDE 1000 MILLILITER(S): 9 INJECTION, SOLUTION INTRAVENOUS at 10:20

## 2023-02-11 RX ADMIN — SODIUM CHLORIDE 1000 MILLILITER(S): 9 INJECTION, SOLUTION INTRAVENOUS at 14:18

## 2023-02-11 NOTE — ED PROVIDER NOTE - CLINICAL SUMMARY MEDICAL DECISION MAKING FREE TEXT BOX
hx, exam, labs, ekg pt has been very comfortable walking in the hallway without symptoms pt is advised to follow up with pmd and return if symptoms persist or worsen.

## 2023-02-11 NOTE — ED ADULT TRIAGE NOTE - CHIEF COMPLAINT QUOTE
Patient arrived to ED for generalized body weakness for the past 2-3 days, Pt complaints cramp to R leg which is chronic. Denies any falls and dizziness. PMx: Asthma/ Gerd

## 2023-02-11 NOTE — ED ADULT NURSE REASSESSMENT NOTE - NS ED NURSE REASSESS COMMENT FT1
Patient aox4 discharged home all discharge instructions given encouraged to follow up with MDs appointment left ER ambulatory in no acute distress.

## 2023-02-11 NOTE — ED PROVIDER NOTE - PROGRESS NOTE DETAILS
Pt has been alert and oriented x 3 ambulating with normal gaits without assist pt ate and tolerated lunch. Pt denies headache, dizziness, neck/back pain, focal/distal weakness or numbness, sob, chest delia, nausea, vomiting, abd pain, Pt is given and explained all test reports and advised to follow up with  pmd and return if symptoms persist or worsen.

## 2023-02-11 NOTE — ED ADULT NURSE NOTE - NSIMPLEMENTINTERV_GEN_ALL_ED
Implemented All Fall Risk Interventions:  [place] to call system. Call bell, personal items and telephone within reach. Instruct patient to call for assistance. Room bathroom lighting operational. Non-slip footwear when patient is off stretcher. Physically safe environment: no spills, clutter or unnecessary equipment. Stretcher in lowest position, wheels locked, appropriate side rails in place. Provide visual cue, wrist band, yellow gown, etc. Monitor gait and stability. Monitor for mental status changes and reorient to person, place, and time. Review medications for side effects contributing to fall risk. Reinforce activity limits and safety measures with patient and family.

## 2023-02-11 NOTE — ED PROVIDER NOTE - PATIENT PORTAL LINK FT
You can access the FollowMyHealth Patient Portal offered by [facility] by registering at the following website: [email or web address]. By joining [employer]’s FollowMyHealth portal, you will also be able to view your health information using other applications (apps) compatible with our system.

## 2023-02-11 NOTE — ED PROVIDER NOTE - OBJECTIVE STATEMENT
67 years old female here c/o generalized weakness for 3 days and unable to sleep with right leg cramps. Pt denies recent hx of trauma, traveling, headache, dizziness, blurred visions, light sensitivities, focal/distal weakness or numbness, difficulty of walking or keeping balance, cough, sob, chest pain, nausea, vomiting, fever, chills, abd pain, dysuria, hematuria, or irregular bowel movements.

## 2023-02-11 NOTE — ED PROVIDER NOTE - MUSCULOSKELETAL, MLM
Spine appears normal, range of motion is not limited, no muscle or joint tenderness no edema nontender to palp bilateral legs

## 2023-02-11 NOTE — ED PROVIDER NOTE - CONSTITUTIONAL, MLM
normal... Well appearing, awake, alert, oriented to person, place, time/situation and in no apparent distress. Speaking in clear full sentences no nasal flaring no shoulders retractions no diaphoresis, appears very comfortable lying in the stretcher in a bright light room

## 2023-02-11 NOTE — ED ADULT NURSE NOTE - OBJECTIVE STATEMENT
Presented to ER aox4 c/o generalized weakness and rt leg  cramp intermittently / denies nausea vomiting and dizziness ,nor chest pain

## 2023-02-12 LAB
CULTURE RESULTS: SIGNIFICANT CHANGE UP
SPECIMEN SOURCE: SIGNIFICANT CHANGE UP

## 2023-02-20 ENCOUNTER — INPATIENT (INPATIENT)
Facility: HOSPITAL | Age: 68
LOS: 16 days | Discharge: NURSING, REHAB OR LONG-TERM CARE | End: 2023-03-09
Attending: INTERNAL MEDICINE | Admitting: INTERNAL MEDICINE
Payer: MEDICARE

## 2023-02-20 VITALS — HEIGHT: 60 IN | WEIGHT: 154.98 LBS

## 2023-02-20 LAB
GLUCOSE BLDC GLUCOMTR-MCNC: 88 MG/DL — SIGNIFICANT CHANGE UP (ref 70–99)

## 2023-02-20 PROCEDURE — 99285 EMERGENCY DEPT VISIT HI MDM: CPT

## 2023-02-20 PROCEDURE — 93010 ELECTROCARDIOGRAM REPORT: CPT

## 2023-02-20 RX ORDER — ACETAMINOPHEN 500 MG
650 TABLET ORAL ONCE
Refills: 0 | Status: COMPLETED | OUTPATIENT
Start: 2023-02-20 | End: 2023-02-20

## 2023-02-20 RX ADMIN — Medication 650 MILLIGRAM(S): at 23:54

## 2023-02-20 NOTE — ED PROVIDER NOTE - CLINICAL SUMMARY MEDICAL DECISION MAKING FREE TEXT BOX
Generalized weakness and diffuse muscle pain over entire body reports of fall.  Patient unable to ambulate.  Will check CT head r/o ICH.  Will get Xray r/o pelvic fractures and spinal fractures, give medication and attempt to stand and ambulate.  Generalized weakness low suspicion for ACS EKG non-ischemic will check troponin, check for metabolic causes of weakness, R/O UTI, PNA and Re-eval Generalized weakness and diffuse muscle pain over entire body reports of fall.  Patient unable to ambulate.  Will check CT head r/o ICH.  Will get Xray r/o pelvic fractures and spinal fractures, give medication and attempt to stand and ambulate.  Generalized weakness low suspicion for ACS EKG non-ischemic will check troponin, check for metabolic causes of weakness, R/O UTI, PNA and Re-eval    Xray and CT negative for acute fracture.  No rhabdo, no metabolic disturbance.  Patient needing assistance to stand unable to walk.  She will benefit from PT eval.  Patient admitted to medicine.

## 2023-02-20 NOTE — ED ADULT NURSE NOTE - OBJECTIVE STATEMENT
AAOx3 pt BIBA s/p tripping and falling at home. Pt denies head trauma, LOC. Pt c/o of non specific body pain 6/10. At time of assessment pt muscle strength is weak in all 4 extremities. Pt reports this is her baseline. Pt has foot drop in the right foot.  Denies use of blood thinner. PMH: Asthma

## 2023-02-20 NOTE — ED PROVIDER NOTE - OBJECTIVE STATEMENT
This patient is a 67 year old woman who presents to the ER via EMS reporting diffuse body pain.  She states that she lives alone and she needs help.  She feels that she needs assistance and a walker.  Patient states that she fell.  Unknown LOC.  She reports generalized weakness and diffuse body pain.  No recent illness, no cough, chest pain or SOB.

## 2023-02-20 NOTE — ED PROVIDER NOTE - CARE PLAN
1 Principal Discharge DX:	Generalized weakness  Secondary Diagnosis:	Unable to ambulate  Secondary Diagnosis:	Fall

## 2023-02-20 NOTE — ED ADULT TRIAGE NOTE - CHIEF COMPLAINT QUOTE
BIBA- from home  Trip/fell and was unable to get up  Fire department opened door  c/o body ache BIBA- from home- lives alone  Trip/fell and was unable to get up  Fire department opened door  c/o body ache after falling forward

## 2023-02-20 NOTE — ED ADULT NURSE NOTE - NSIMPLEMENTINTERV_GEN_ALL_ED
Implemented All Fall Risk Interventions:  [place] to call system. Call bell, personal items and telephone within reach. Instruct patient to call for assistance. Room bathroom lighting operational. Non-slip footwear when patient is off stretcher. Physically safe environment: no spills, clutter or unnecessary equipment. Stretcher in lowest position, wheels locked, appropriate side rails in place. Provide visual cue, wrist band, yellow gown, etc. Monitor gait and stability. Monitor for mental status changes and reorient to person, place, and time. Review medications for side effects contributing to fall risk. Reinforce activity limits and safety measures with patient and family.

## 2023-02-20 NOTE — ED ADULT NURSE NOTE - CHIEF COMPLAINT QUOTE
BIBA- from home- lives alone  Trip/fell and was unable to get up  Fire department opened door  c/o body ache after falling forward
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
## 2023-02-21 DIAGNOSIS — M54.41 LUMBAGO WITH SCIATICA, RIGHT SIDE: ICD-10-CM

## 2023-02-21 LAB
ALBUMIN SERPL ELPH-MCNC: 3.5 G/DL — SIGNIFICANT CHANGE UP (ref 3.3–5)
ALP SERPL-CCNC: 92 U/L — SIGNIFICANT CHANGE UP (ref 40–120)
ALT FLD-CCNC: 28 U/L — SIGNIFICANT CHANGE UP (ref 12–78)
ANION GAP SERPL CALC-SCNC: 4 MMOL/L — LOW (ref 5–17)
APPEARANCE UR: CLEAR — SIGNIFICANT CHANGE UP
AST SERPL-CCNC: 22 U/L — SIGNIFICANT CHANGE UP (ref 15–37)
BACTERIA # UR AUTO: ABNORMAL
BASOPHILS # BLD AUTO: 0.06 K/UL — SIGNIFICANT CHANGE UP (ref 0–0.2)
BASOPHILS NFR BLD AUTO: 0.7 % — SIGNIFICANT CHANGE UP (ref 0–2)
BILIRUB SERPL-MCNC: 0.3 MG/DL — SIGNIFICANT CHANGE UP (ref 0.2–1.2)
BILIRUB UR-MCNC: NEGATIVE — SIGNIFICANT CHANGE UP
BUN SERPL-MCNC: 14 MG/DL — SIGNIFICANT CHANGE UP (ref 7–23)
CALCIUM SERPL-MCNC: 9.2 MG/DL — SIGNIFICANT CHANGE UP (ref 8.5–10.1)
CHLORIDE SERPL-SCNC: 108 MMOL/L — SIGNIFICANT CHANGE UP (ref 96–108)
CK SERPL-CCNC: 99 U/L — SIGNIFICANT CHANGE UP (ref 26–192)
CO2 SERPL-SCNC: 28 MMOL/L — SIGNIFICANT CHANGE UP (ref 22–31)
COLOR SPEC: YELLOW — SIGNIFICANT CHANGE UP
CREAT SERPL-MCNC: 0.71 MG/DL — SIGNIFICANT CHANGE UP (ref 0.5–1.3)
DIFF PNL FLD: NEGATIVE — SIGNIFICANT CHANGE UP
EGFR: 93 ML/MIN/1.73M2 — SIGNIFICANT CHANGE UP
EOSINOPHIL # BLD AUTO: 0.3 K/UL — SIGNIFICANT CHANGE UP (ref 0–0.5)
EOSINOPHIL NFR BLD AUTO: 3.6 % — SIGNIFICANT CHANGE UP (ref 0–6)
EPI CELLS # UR: SIGNIFICANT CHANGE UP
FLUAV AG NPH QL: SIGNIFICANT CHANGE UP
FLUBV AG NPH QL: SIGNIFICANT CHANGE UP
GLUCOSE SERPL-MCNC: 104 MG/DL — HIGH (ref 70–99)
GLUCOSE UR QL: NEGATIVE MG/DL — SIGNIFICANT CHANGE UP
HCT VFR BLD CALC: 41.1 % — SIGNIFICANT CHANGE UP (ref 34.5–45)
HGB BLD-MCNC: 13.9 G/DL — SIGNIFICANT CHANGE UP (ref 11.5–15.5)
IMM GRANULOCYTES NFR BLD AUTO: 0.2 % — SIGNIFICANT CHANGE UP (ref 0–0.9)
KETONES UR-MCNC: NEGATIVE — SIGNIFICANT CHANGE UP
LEUKOCYTE ESTERASE UR-ACNC: ABNORMAL
LYMPHOCYTES # BLD AUTO: 3.11 K/UL — SIGNIFICANT CHANGE UP (ref 1–3.3)
LYMPHOCYTES # BLD AUTO: 37.1 % — SIGNIFICANT CHANGE UP (ref 13–44)
MAGNESIUM SERPL-MCNC: 2.4 MG/DL — SIGNIFICANT CHANGE UP (ref 1.6–2.6)
MCHC RBC-ENTMCNC: 31.4 PG — SIGNIFICANT CHANGE UP (ref 27–34)
MCHC RBC-ENTMCNC: 33.8 G/DL — SIGNIFICANT CHANGE UP (ref 32–36)
MCV RBC AUTO: 92.8 FL — SIGNIFICANT CHANGE UP (ref 80–100)
MONOCYTES # BLD AUTO: 0.77 K/UL — SIGNIFICANT CHANGE UP (ref 0–0.9)
MONOCYTES NFR BLD AUTO: 9.2 % — SIGNIFICANT CHANGE UP (ref 2–14)
NEUTROPHILS # BLD AUTO: 4.12 K/UL — SIGNIFICANT CHANGE UP (ref 1.8–7.4)
NEUTROPHILS NFR BLD AUTO: 49.2 % — SIGNIFICANT CHANGE UP (ref 43–77)
NITRITE UR-MCNC: NEGATIVE — SIGNIFICANT CHANGE UP
NRBC # BLD: 0 /100 WBCS — SIGNIFICANT CHANGE UP (ref 0–0)
PH UR: 6 — SIGNIFICANT CHANGE UP (ref 5–8)
PLATELET # BLD AUTO: 251 K/UL — SIGNIFICANT CHANGE UP (ref 150–400)
POTASSIUM SERPL-MCNC: 3.9 MMOL/L — SIGNIFICANT CHANGE UP (ref 3.5–5.3)
POTASSIUM SERPL-SCNC: 3.9 MMOL/L — SIGNIFICANT CHANGE UP (ref 3.5–5.3)
PROT SERPL-MCNC: 7.7 GM/DL — SIGNIFICANT CHANGE UP (ref 6–8.3)
PROT UR-MCNC: 15 MG/DL
RBC # BLD: 4.43 M/UL — SIGNIFICANT CHANGE UP (ref 3.8–5.2)
RBC # FLD: 12.5 % — SIGNIFICANT CHANGE UP (ref 10.3–14.5)
RBC CASTS # UR COMP ASSIST: NEGATIVE /HPF — SIGNIFICANT CHANGE UP (ref 0–4)
SARS-COV-2 RNA SPEC QL NAA+PROBE: SIGNIFICANT CHANGE UP
SODIUM SERPL-SCNC: 140 MMOL/L — SIGNIFICANT CHANGE UP (ref 135–145)
SP GR SPEC: 1.01 — SIGNIFICANT CHANGE UP (ref 1.01–1.02)
TROPONIN I, HIGH SENSITIVITY RESULT: 18.2 NG/L — SIGNIFICANT CHANGE UP
UROBILINOGEN FLD QL: NEGATIVE MG/DL — SIGNIFICANT CHANGE UP
WBC # BLD: 8.38 K/UL — SIGNIFICANT CHANGE UP (ref 3.8–10.5)
WBC # FLD AUTO: 8.38 K/UL — SIGNIFICANT CHANGE UP (ref 3.8–10.5)
WBC UR QL: SIGNIFICANT CHANGE UP

## 2023-02-21 PROCEDURE — 72070 X-RAY EXAM THORAC SPINE 2VWS: CPT | Mod: 26

## 2023-02-21 PROCEDURE — 72125 CT NECK SPINE W/O DYE: CPT | Mod: 26,MA

## 2023-02-21 PROCEDURE — 72190 X-RAY EXAM OF PELVIS: CPT | Mod: 26

## 2023-02-21 PROCEDURE — 70450 CT HEAD/BRAIN W/O DYE: CPT | Mod: 26,MA

## 2023-02-21 PROCEDURE — 72192 CT PELVIS W/O DYE: CPT | Mod: 26,QQ

## 2023-02-21 PROCEDURE — 71045 X-RAY EXAM CHEST 1 VIEW: CPT | Mod: 26

## 2023-02-21 PROCEDURE — 72100 X-RAY EXAM L-S SPINE 2/3 VWS: CPT | Mod: 26

## 2023-02-21 PROCEDURE — 72040 X-RAY EXAM NECK SPINE 2-3 VW: CPT | Mod: 26

## 2023-02-21 PROCEDURE — 72131 CT LUMBAR SPINE W/O DYE: CPT | Mod: 26,MA

## 2023-02-21 PROCEDURE — 99222 1ST HOSP IP/OBS MODERATE 55: CPT

## 2023-02-21 PROCEDURE — 72128 CT CHEST SPINE W/O DYE: CPT | Mod: 26,MA

## 2023-02-21 RX ORDER — LANOLIN ALCOHOL/MO/W.PET/CERES
3 CREAM (GRAM) TOPICAL AT BEDTIME
Refills: 0 | Status: DISCONTINUED | OUTPATIENT
Start: 2023-02-21 | End: 2023-02-26

## 2023-02-21 RX ORDER — GABAPENTIN 400 MG/1
300 CAPSULE ORAL THREE TIMES A DAY
Refills: 0 | Status: DISCONTINUED | OUTPATIENT
Start: 2023-02-21 | End: 2023-02-26

## 2023-02-21 RX ORDER — INFLUENZA VIRUS VACCINE 15; 15; 15; 15 UG/.5ML; UG/.5ML; UG/.5ML; UG/.5ML
0.7 SUSPENSION INTRAMUSCULAR ONCE
Refills: 0 | Status: DISCONTINUED | OUTPATIENT
Start: 2023-02-21 | End: 2023-03-09

## 2023-02-21 RX ORDER — CYCLOBENZAPRINE HYDROCHLORIDE 10 MG/1
10 TABLET, FILM COATED ORAL THREE TIMES A DAY
Refills: 0 | Status: DISCONTINUED | OUTPATIENT
Start: 2023-02-21 | End: 2023-02-26

## 2023-02-21 RX ORDER — HEPARIN SODIUM 5000 [USP'U]/ML
5000 INJECTION INTRAVENOUS; SUBCUTANEOUS EVERY 8 HOURS
Refills: 0 | Status: COMPLETED | OUTPATIENT
Start: 2023-02-21 | End: 2023-02-25

## 2023-02-21 RX ORDER — ACETAMINOPHEN 500 MG
650 TABLET ORAL EVERY 6 HOURS
Refills: 0 | Status: DISCONTINUED | OUTPATIENT
Start: 2023-02-21 | End: 2023-02-26

## 2023-02-21 RX ORDER — DEXAMETHASONE 0.5 MG/5ML
4 ELIXIR ORAL EVERY 6 HOURS
Refills: 0 | Status: DISCONTINUED | OUTPATIENT
Start: 2023-02-21 | End: 2023-02-26

## 2023-02-21 RX ADMIN — Medication 4 MILLIGRAM(S): at 17:10

## 2023-02-21 RX ADMIN — HEPARIN SODIUM 5000 UNIT(S): 5000 INJECTION INTRAVENOUS; SUBCUTANEOUS at 21:17

## 2023-02-21 RX ADMIN — HEPARIN SODIUM 5000 UNIT(S): 5000 INJECTION INTRAVENOUS; SUBCUTANEOUS at 13:14

## 2023-02-21 RX ADMIN — CYCLOBENZAPRINE HYDROCHLORIDE 10 MILLIGRAM(S): 10 TABLET, FILM COATED ORAL at 17:10

## 2023-02-21 RX ADMIN — GABAPENTIN 300 MILLIGRAM(S): 400 CAPSULE ORAL at 21:18

## 2023-02-21 RX ADMIN — GABAPENTIN 300 MILLIGRAM(S): 400 CAPSULE ORAL at 13:13

## 2023-02-21 RX ADMIN — Medication 650 MILLIGRAM(S): at 13:13

## 2023-02-21 NOTE — H&P ADULT - HISTORY OF PRESENT ILLNESS
67 years old female with h/o sciatica pain present to ED with fall. Patient reported right sided sciatica pain, associated with numbness and weakness of right lower extremity. Patient first noted weakness/numbness around 6-7 months ago, which appear worsened over last one month. Patient ambulate independently and live alone. Yesterday, patient reported fall due to RLE weakness and unable to get up. No loss of bladder or bowel control.   Hemodynamically stable, afebrile, sat well at RA. No leukocytosis, K 3.9, Cr 0.71, CK 99. UA negative for UTI. CT head with no acute pathology. CT C spine with no fracture or malalignment. CT pelvis with no fracture or dislocation. CT T spine with no fracture. L spine noted multilevel degenerative changes. Moderate spinal cancal stenosis at L5-S1, appears grossly unchanged from 12/7/2022    SH: no toxic habits  FH: no family h/o HTN, DM

## 2023-02-21 NOTE — PATIENT PROFILE ADULT - FALL HARM RISK - HARM RISK INTERVENTIONS
Assistance with ambulation/Assistance OOB with selected safe patient handling equipment/Communicate Risk of Fall with Harm to all staff/Discuss with provider need for PT consult/Monitor for mental status changes/Monitor gait and stability/Reinforce activity limits and safety measures with patient and family/Tailored Fall Risk Interventions/Use of alarms - bed, chair and/or voice tab/Visual Cue: Yellow wristband and red socks/Bed in lowest position, wheels locked, appropriate side rails in place/Call bell, personal items and telephone in reach/Instruct patient to call for assistance before getting out of bed or chair/Non-slip footwear when patient is out of bed/[place] to call system/Physically safe environment - no spills, clutter or unnecessary equipment/Purposeful Proactive Rounding/Room/bathroom lighting operational, light cord in reach

## 2023-02-21 NOTE — H&P ADULT - PROBLEM SELECTOR PLAN 2
right sided sciatica pain with worsening RLE weakness/numbness.   CT T spine with no fracture. L spine noted multilevel degenerative changes. Moderate spinal cancal stenosis at L5-S1, appears grossly unchanged from 12/7/2022  MRI L spine  Ortho consulted

## 2023-02-21 NOTE — H&P ADULT - PROBLEM SELECTOR PLAN 1
present with fall  CT head with no acute pathology. CT C spine with no fracture or malalignment. CT pelvis with no fracture or dislocation. CT T spine with no fracture. L spine noted multilevel degenerative changes. Moderate spinal cancal stenosis at L5-S1, appears grossly unchanged from 12/7/2022  Ortho consulted  PT consult  Fall precaution  Continue gabapentin

## 2023-02-21 NOTE — H&P ADULT - ASSESSMENT
67 years old female with h/o sciatica pain present to ED with fall. Patient reported right sided sciatica pain, associated with numbness and weakness of right lower extremity. Patient first noted weakness/numbness around 6-7 months ago, which appear worsened over last one month. Patient ambulate independently and live alone. Yesterday, patient reported fall due to RLE weakness and unable to get up. No loss of bladder or bowel control.   Hemodynamically stable, afebrile, sat well at RA. No leukocytosis, K 3.9, Cr 0.71, CK 99. UA negative for UTI. CT head with no acute pathology. CT C spine with no fracture or malalignment. CT pelvis with no fracture or dislocation. CT T spine with no fracture. L spine noted multilevel degenerative changes. Moderate spinal canal stenosis at L5-S1, appears grossly unchanged from 12/7/2022      Admitted with fall

## 2023-02-21 NOTE — H&P ADULT - NSHPPHYSICALEXAM_GEN_ALL_CORE
CONSTITUTIONAL: alert and cooperative, no acute distress  EYES: PERRL,  no scleral icterus  ENT: Mucosa moist, tongue normal.  NECK: Neck supple, trachea midline, non-tender  CARDIAC: Normal S1 and S2. Regular rate and rhythms. No Pedal edema. Peripheral pulses intact  LUNGS: Equal air entry both lungs. No rales, rhonchi, wheezing. Normal respiratory effort.   ABDOMEN: Soft, nondistended, nontender. No guarding or rebound tenderness. No hepatomegaly or splenomegaly. Bowel sound normal.  MUSCULOSKELETAL: Normocephalic, atraumatic. No significant deformity or joint abnormality  NEUROLOGICAL: Decrease sensation in RLE. Unable to lift RLE against gravity. Weakness noted in dorsiflexion bilaterally. LLE motor 4+/5  SKIN: no lesions or eruptions. Normal turgor  PSYCHIATRIC: A&O x 3, appropriate mood and affect

## 2023-02-21 NOTE — ED ADULT NURSE REASSESSMENT NOTE - NS ED NURSE REASSESS COMMENT FT1
Patient received AO 3 breathing unlabored on room air, pt denies any discomfort, pt instructed she's admitting and will be sending to floor as soon as a bed is available, pt verbalizes understanding. Heplock to 22 gauge to R hand patent and intact, no s/s of redness or infiltration noted. Continuous monitoring in place.

## 2023-02-21 NOTE — CONSULT NOTE ADULT - SUBJECTIVE AND OBJECTIVE BOX
Pt is a 67yF evaluated by orthopedic spine in Decmeber 2022 here after a mechanical fall with exacerbation of chronic symptoms seen on last visit. Pt has known RLE pain, numbness and weakness which worsened overnight after a mechanical fall at home. Pt lives alone at home, denies headstrike and loss of consciousness and reports having a mechanical fall last night in her bedroom when returning from the bathroom. Pt called 911 and was BIBEMS to [facility] for further care. Pt was admitted to medicine for further care, and scans revealed some L5-1 canal, bilateral neuroforaminal stenosis. Ortho spine was consulted for further care. Pt endorses b/l LE numbness, weakness tingling and radicular pain, R>L, along with RUE weakness, tingling and pain. Pt reports mild difficulty with buttoning her shirt, denies gait instability. Pt denies fevers, chills, bowel/bladder incontinence or saddle anesthesia.    T(C): 36.7 (02-21-23 @ 08:48), Max: 37 (02-21-23 @ 07:47)  T(F): 98.1 (02-21-23 @ 08:48), Max: 98.6 (02-21-23 @ 07:47)  HR: 66 (02-21-23 @ 08:48) (61 - 66)  BP: 157/92 (02-21-23 @ 08:48) (122/89 - 157/92)  RR: 16 (02-21-23 @ 08:48) (14 - 18)  SpO2: 100% (02-21-23 @ 08:48) (97% - 100%)    Exam:    General: NAD    Spine:    No palpable step off. Nontender to palpation.     Motor:                   C5                C6              C7               C8           T1   R            4/5                4/5            4/5             4/5          5/5  L             5/5               5/5             5/5             5/5          5/5                L2             L3             L4               L5            S1  R         3/5           3/5          3/5             4/5           4/5  L          4/5          4/5           5/5             5/5           5/5    Sensory:            C5         C6         C7      C8       T1        (0=absent, 1=impaired, 2=normal, NT=not testable)  R         2            2           2        2         2  L          2            2           2        2         2               L2          L3         L4      L5       S1         (0=absent, 1=impaired, 2=normal, NT=not testable)  R         1            2            2        1        1  L          2            2           2        2         2    +SLR RLE (pain with passive motion only)  -SLR LLE    UMNs:    Babinski (-) B/L  Clonus (-) B/L  Perez (-) B/L                          13.9   8.38  )-----------( 251      ( 21 Feb 2023 00:10 )             41.1   02-21    140  |  108  |  14  ----------------------------<  104<H>  3.9   |  28  |  0.71    Ca    9.2      21 Feb 2023 00:10  Mg     2.4     02-21    TPro  7.7  /  Alb  3.5  /  TBili  0.3  /  DBili  x   /  AST  22  /  ALT  28  /  AlkPhos  92  02-21      *INCOMPLETE NOTE*  A/p:  Pt is a 67yF with lumbar spinal stenosis, possible cervical myelopathy   WBAT BLLE/PT/OT  FU MR L Sp and possible MR C/T Sp  Pain regimen PRN  DVT ppx: per primary team  Dispo per PT  Plan discussed w patient, who is in agreement with the above  Plan discussed w attending, who is in agreement with the above   Pt is a 67yF evaluated by orthopedic spine in Decmeber 2022 here after a mechanical fall with exacerbation of chronic symptoms seen on last visit. Pt has known RLE pain, numbness and weakness which worsened overnight after a mechanical fall at home. Pt lives alone at home, denies headstrike and loss of consciousness and reports having a mechanical fall last night in her bedroom when returning from the bathroom. Pt called 911 and was BIBEMS to [facility] for further care. Pt was admitted to medicine for further care, and scans revealed some L5-1 canal, bilateral neuroforaminal stenosis. Ortho spine was consulted for further care. Pt endorses b/l LE numbness, weakness tingling and radicular pain, R>L, along with RUE weakness, tingling and pain. Pt reports mild difficulty with buttoning her shirt, denies gait instability. Pt denies fevers, chills, bowel/bladder incontinence or saddle anesthesia.    T(C): 36.7 (02-21-23 @ 08:48), Max: 37 (02-21-23 @ 07:47)  T(F): 98.1 (02-21-23 @ 08:48), Max: 98.6 (02-21-23 @ 07:47)  HR: 66 (02-21-23 @ 08:48) (61 - 66)  BP: 157/92 (02-21-23 @ 08:48) (122/89 - 157/92)  RR: 16 (02-21-23 @ 08:48) (14 - 18)  SpO2: 100% (02-21-23 @ 08:48) (97% - 100%)    Exam:    General: NAD    Spine:    No palpable step off. Nontender to palpation.     Motor:                   C5                C6              C7               C8           T1   R            4/5                4/5            4/5             4/5          5/5  L             5/5               5/5             5/5             5/5          5/5                L2             L3             L4               L5            S1  R         3/5           3/5          3/5             4/5           4/5  L          4/5          4/5           5/5             5/5           5/5    Sensory:            C5         C6         C7      C8       T1        (0=absent, 1=impaired, 2=normal, NT=not testable)  R         2            2           2        2         2  L          2            2           2        2         2               L2          L3         L4      L5       S1         (0=absent, 1=impaired, 2=normal, NT=not testable)  R         1            2            2        1        1  L          2            2           2        2         2    +SLR RLE (pain with passive motion only)  -SLR LLE    UMNs:    Babinski (-) B/L  Clonus (-) B/L  Perez (-) B/L                          13.9   8.38  )-----------( 251      ( 21 Feb 2023 00:10 )             41.1   02-21    140  |  108  |  14  ----------------------------<  104<H>  3.9   |  28  |  0.71    Ca    9.2      21 Feb 2023 00:10  Mg     2.4     02-21    TPro  7.7  /  Alb  3.5  /  TBili  0.3  /  DBili  x   /  AST  22  /  ALT  28  /  AlkPhos  92  02-21    Imaging:  CT C Sp shows C4-5, C6-7 disc osteophyte complexes compressing the thecal sac ventrally  CT L Sp shows mild b/l neuroforaminal stenosis at L5-S1, mod canal stenosis      *INCOMPLETE NOTE*  A/p:  Pt is a 67yF with lumbar spinal stenosis, possible cervical myelopathy   WBAT BLLE/PT/OT  FU Repeat MR L Sp and possible MR C/T Sp  Pain regimen PRN  DVT ppx: per primary team  Dispo per PT  Plan discussed w patient, who is in agreement with the above  Plan discussed w attending, who is in agreement with the above   Pt is a 67yF evaluated by orthopedic spine in Decmeber 2022 here after a mechanical fall with exacerbation of chronic symptoms seen on last visit. Pt has known RLE pain, numbness and weakness which worsened overnight after a mechanical fall at home. Pt lives alone at home, denies headstrike and loss of consciousness and reports having a mechanical fall last night in her bedroom when returning from the bathroom. Pt called 911 and was BIBEMS to [facility] for further care. Pt was admitted to medicine for further care, and scans revealed some L5-1 canal, bilateral neuroforaminal stenosis. Ortho spine was consulted for further care. Pt endorses b/l LE numbness, weakness tingling and radicular pain, R>L, along with RUE weakness, tingling and pain. Pt reports mild difficulty with buttoning her shirt, denies gait instability. Patient endorses weakness involving the right upper extremity and right lower extremity, right hand numbness in a nondescript location, and left trunk dysesthesias. Pt denies fevers, chills, bowel/bladder incontinence or saddle anesthesia.    T(C): 36.7 (02-21-23 @ 08:48), Max: 37 (02-21-23 @ 07:47)  T(F): 98.1 (02-21-23 @ 08:48), Max: 98.6 (02-21-23 @ 07:47)  HR: 66 (02-21-23 @ 08:48) (61 - 66)  BP: 157/92 (02-21-23 @ 08:48) (122/89 - 157/92)  RR: 16 (02-21-23 @ 08:48) (14 - 18)  SpO2: 100% (02-21-23 @ 08:48) (97% - 100%)    Exam:    General: NAD    Spine:    No palpable step off. Nontender to palpation.     Motor:                   C5                C6              C7               C8           T1   R            4/5                4/5            4/5             4/5          5/5  L             5/5               5/5             5/5             5/5          5/5                L2             L3             L4               L5            S1  R         3/5           3/5          3/5             4/5           4/5  L          4/5          4/5           5/5             5/5           5/5    Sensory:            C5         C6         C7      C8       T1        (0=absent, 1=impaired, 2=normal, NT=not testable)  R         2            2           2        2         2  L          2            2           2        2         2               L2          L3         L4      L5       S1         (0=absent, 1=impaired, 2=normal, NT=not testable)  R         1            2            2        1        1  L          2            2           2        2         2    +SLR RLE (pain with passive motion only)  -SLR LLE    UMNs:    Babinski (-) B/L  Clonus (-) B/L  Perez (positive) B/L                          13.9   8.38  )-----------( 251      ( 21 Feb 2023 00:10 )             41.1   02-21    140  |  108  |  14  ----------------------------<  104<H>  3.9   |  28  |  0.71    Ca    9.2      21 Feb 2023 00:10  Mg     2.4     02-21    TPro  7.7  /  Alb  3.5  /  TBili  0.3  /  DBili  x   /  AST  22  /  ALT  28  /  AlkPhos  92  02-21    Imaging:  CT C Sp shows C4-5, C6-7 disc osteophyte complexes compressing the thecal sac ventrally  CT L Sp shows mild b/l neuroforaminal stenosis at L5-S1, mod canal stenosis      A/p:  Pt is a 67yF with lumbar spinal stenosis, C4-6 spinal stenosis  WBAT BLLE/PT/OT  FU Repeat MR L Sp and possible MR C/T Sp  Pain regimen PRN  DVT ppx: per primary team  Dispo per PT  Plan discussed w patient, who is in agreement with the above  Plan discussed w attending, who is in agreement with the above

## 2023-02-22 LAB
ALBUMIN SERPL ELPH-MCNC: 2.9 G/DL — LOW (ref 3.3–5)
ALP SERPL-CCNC: 82 U/L — SIGNIFICANT CHANGE UP (ref 40–120)
ALT FLD-CCNC: 24 U/L — SIGNIFICANT CHANGE UP (ref 12–78)
ANION GAP SERPL CALC-SCNC: 8 MMOL/L — SIGNIFICANT CHANGE UP (ref 5–17)
AST SERPL-CCNC: 13 U/L — LOW (ref 15–37)
BILIRUB SERPL-MCNC: 0.2 MG/DL — SIGNIFICANT CHANGE UP (ref 0.2–1.2)
BUN SERPL-MCNC: 14 MG/DL — SIGNIFICANT CHANGE UP (ref 7–23)
CALCIUM SERPL-MCNC: 9.2 MG/DL — SIGNIFICANT CHANGE UP (ref 8.5–10.1)
CHLORIDE SERPL-SCNC: 107 MMOL/L — SIGNIFICANT CHANGE UP (ref 96–108)
CO2 SERPL-SCNC: 24 MMOL/L — SIGNIFICANT CHANGE UP (ref 22–31)
CREAT SERPL-MCNC: 0.6 MG/DL — SIGNIFICANT CHANGE UP (ref 0.5–1.3)
EGFR: 98 ML/MIN/1.73M2 — SIGNIFICANT CHANGE UP
GLUCOSE SERPL-MCNC: 116 MG/DL — HIGH (ref 70–99)
HCT VFR BLD CALC: 39.9 % — SIGNIFICANT CHANGE UP (ref 34.5–45)
HCV AB S/CO SERPL IA: 0.12 S/CO — SIGNIFICANT CHANGE UP (ref 0–0.99)
HCV AB SERPL-IMP: SIGNIFICANT CHANGE UP
HGB BLD-MCNC: 13.6 G/DL — SIGNIFICANT CHANGE UP (ref 11.5–15.5)
MCHC RBC-ENTMCNC: 31.1 PG — SIGNIFICANT CHANGE UP (ref 27–34)
MCHC RBC-ENTMCNC: 34.1 G/DL — SIGNIFICANT CHANGE UP (ref 32–36)
MCV RBC AUTO: 91.3 FL — SIGNIFICANT CHANGE UP (ref 80–100)
NRBC # BLD: 0 /100 WBCS — SIGNIFICANT CHANGE UP (ref 0–0)
PHOSPHATE SERPL-MCNC: 3.8 MG/DL — SIGNIFICANT CHANGE UP (ref 2.5–4.5)
PLATELET # BLD AUTO: 256 K/UL — SIGNIFICANT CHANGE UP (ref 150–400)
POTASSIUM SERPL-MCNC: 4.1 MMOL/L — SIGNIFICANT CHANGE UP (ref 3.5–5.3)
POTASSIUM SERPL-SCNC: 4.1 MMOL/L — SIGNIFICANT CHANGE UP (ref 3.5–5.3)
PROT SERPL-MCNC: 7.2 GM/DL — SIGNIFICANT CHANGE UP (ref 6–8.3)
RBC # BLD: 4.37 M/UL — SIGNIFICANT CHANGE UP (ref 3.8–5.2)
RBC # FLD: 12.3 % — SIGNIFICANT CHANGE UP (ref 10.3–14.5)
SODIUM SERPL-SCNC: 139 MMOL/L — SIGNIFICANT CHANGE UP (ref 135–145)
WBC # BLD: 6.33 K/UL — SIGNIFICANT CHANGE UP (ref 3.8–10.5)
WBC # FLD AUTO: 6.33 K/UL — SIGNIFICANT CHANGE UP (ref 3.8–10.5)

## 2023-02-22 PROCEDURE — 72148 MRI LUMBAR SPINE W/O DYE: CPT | Mod: 26

## 2023-02-22 PROCEDURE — 99233 SBSQ HOSP IP/OBS HIGH 50: CPT

## 2023-02-22 PROCEDURE — 72141 MRI NECK SPINE W/O DYE: CPT | Mod: 26

## 2023-02-22 RX ADMIN — Medication 4 MILLIGRAM(S): at 23:06

## 2023-02-22 RX ADMIN — CYCLOBENZAPRINE HYDROCHLORIDE 10 MILLIGRAM(S): 10 TABLET, FILM COATED ORAL at 22:19

## 2023-02-22 RX ADMIN — Medication 4 MILLIGRAM(S): at 05:08

## 2023-02-22 RX ADMIN — HEPARIN SODIUM 5000 UNIT(S): 5000 INJECTION INTRAVENOUS; SUBCUTANEOUS at 22:19

## 2023-02-22 RX ADMIN — Medication 4 MILLIGRAM(S): at 12:12

## 2023-02-22 RX ADMIN — Medication 4 MILLIGRAM(S): at 17:42

## 2023-02-22 RX ADMIN — Medication 3 MILLIGRAM(S): at 22:20

## 2023-02-22 RX ADMIN — GABAPENTIN 300 MILLIGRAM(S): 400 CAPSULE ORAL at 13:43

## 2023-02-22 RX ADMIN — GABAPENTIN 300 MILLIGRAM(S): 400 CAPSULE ORAL at 22:19

## 2023-02-22 RX ADMIN — GABAPENTIN 300 MILLIGRAM(S): 400 CAPSULE ORAL at 05:08

## 2023-02-22 RX ADMIN — Medication 4 MILLIGRAM(S): at 00:05

## 2023-02-22 RX ADMIN — HEPARIN SODIUM 5000 UNIT(S): 5000 INJECTION INTRAVENOUS; SUBCUTANEOUS at 13:43

## 2023-02-22 RX ADMIN — HEPARIN SODIUM 5000 UNIT(S): 5000 INJECTION INTRAVENOUS; SUBCUTANEOUS at 05:07

## 2023-02-22 NOTE — PROGRESS NOTE ADULT - SUBJECTIVE AND OBJECTIVE BOX
Orthopedics      Patient seen and examined at bedside. Feeling well. Pain controlled. No n/v. No acute events overnight.    Vital Signs Last 24 Hrs  T(C): 36.4 (02-22-23 @ 04:45), Max: 37 (02-21-23 @ 07:47)  T(F): 97.6 (02-22-23 @ 04:45), Max: 98.6 (02-21-23 @ 07:47)  HR: 66 (02-22-23 @ 04:45) (61 - 80)  BP: 123/83 (02-22-23 @ 04:45) (113/76 - 157/92)  BP(mean): --  RR: 18 (02-21-23 @ 23:31) (15 - 18)  SpO2: 99% (02-21-23 @ 23:31) (97% - 100%)            General: NAD    Spine:    No palpable step off. Nontender to palpation.     Motor:                   C5                C6              C7               C8           T1   R            4/5                4/5            4/5             4/5          5/5  L             5/5               5/5             5/5             5/5          5/5                L2             L3             L4               L5            S1  R         3/5           3/5          3/5             4/5           4/5  L          4/5          4/5           5/5             5/5           5/5    Sensory:            C5         C6         C7      C8       T1        (0=absent, 1=impaired, 2=normal, NT=not testable)  R         2            2           2        2         2  L          2            2           2        2         2               L2          L3         L4      L5       S1         (0=absent, 1=impaired, 2=normal, NT=not testable)  R         1            2            2        1        1  L          2            2           2        2         2    +SLR RLE (pain with passive motion only)  -SLR LLE    UMNs:    Babinski (-) B/L  Clonus (-) B/L  Perez (-) B/L               Imaging:  CT C Sp shows C4-5, C6-7 disc osteophyte complexes compressing the thecal sac ventrally  CT L Sp shows mild b/l neuroforaminal stenosis at L5-S1, mod canal stenosis        A/p:  Pt is a 67yF with lumbar spinal stenosis, possible cervical myelopathy   WBAT BLLE/PT/OT  FU MR C/L Sp  further recs upon MR result today  Pain regimen PRN  DVT ppx: per primary team  Dispo per PT  Plan discussed w patient, who is in agreement with the above  Plan discussed w attending, who is in agreement with the above

## 2023-02-22 NOTE — PROGRESS NOTE ADULT - SUBJECTIVE AND OBJECTIVE BOX
Patient is a 67y old  Female who presents with a chief complaint of fall, sciatica pain with RLE weakness/numbness (2023 07:13)    INTERVAL HPI/OVERNIGHT EVENTS: Patients seen and examined at bedside this morning. No acute events overnight. Pt reports she had weakness with walking and sitting. Reports strength is well. Wants to go to rehab.     MEDICATIONS  (STANDING):  dexAMETHasone     Tablet 4 milliGRAM(s) Oral every 6 hours  gabapentin 300 milliGRAM(s) Oral three times a day  heparin   Injectable 5000 Unit(s) SubCutaneous every 8 hours  influenza  Vaccine (HIGH DOSE) 0.7 milliLiter(s) IntraMuscular once    MEDICATIONS  (PRN):  acetaminophen     Tablet .. 650 milliGRAM(s) Oral every 6 hours PRN Temp greater or equal to 38C (100.4F), Mild Pain (1 - 3), Moderate Pain (4 - 6)  cyclobenzaprine 10 milliGRAM(s) Oral three times a day PRN Muscle Spasm  melatonin 3 milliGRAM(s) Oral at bedtime PRN Insomnia    Allergies    No Known Allergies    Intolerances      REVIEW OF SYSTEMS:  All other systems reviewed and are negative    Vital Signs Last 24 Hrs  T(C): 37 (2023 12:13), Max: 37 (2023 12:13)  T(F): 98.6 (2023 12:13), Max: 98.6 (2023 12:13)  HR: 93 (2023 12:13) (62 - 93)  BP: 138/88 (2023 12:13) (113/76 - 138/88)  BP(mean): --  RR: 17 (2023 12:13) (17 - 18)  SpO2: 97% (2023 12:13) (97% - 99%)    Parameters below as of 2023 14:10  Patient On (Oxygen Delivery Method): room air      Daily     Daily   I&O's Summary    2023 07:01  -  2023 07:00  --------------------------------------------------------  IN: 240 mL / OUT: 300 mL / NET: -60 mL    2023 07:01  -  2023 12:44  --------------------------------------------------------  IN: 360 mL / OUT: 0 mL / NET: 360 mL      CAPILLARY BLOOD GLUCOSE        PHYSICAL EXAM:  GENERAL: NAD, well-groomed, well-developed  HEAD:  Atraumatic, Normocephalic  EYES: EOMI, PERRLA, conjunctiva and sclera clear  ENMT: No tonsillar erythema, exudates, or enlargement; Moist mucous membranes, Good dentition, No lesions  NECK: Supple, No JVD, Normal thyroid  NERVOUS SYSTEM:  Alert & Oriented X2, Good concentration; Motor Strength 3/5 B/L upper and lower extremities; DTRs 2+ intact and symmetric  CHEST/LUNG: Clear to percussion bilaterally; No rales, rhonchi, wheezing, or rubs  HEART: Regular rate and rhythm; No murmurs, rubs, or gallops  ABDOMEN: Soft, Nontender, Nondistended; Bowel sounds present  EXTREMITIES:  2+ Peripheral Pulses, No clubbing, cyanosis, or edema  LYMPH: No lymphadenopathy noted  SKIN: No rashes or lesions    Labs                          13.6   6.33  )-----------( 256      ( 2023 07:38 )             39.9     02-22    139  |  107  |  14  ----------------------------<  116<H>  4.1   |  24  |  0.60    Ca    9.2      2023 07:38  Phos  3.8     02-  Mg     2.4     02-    TPro  7.2  /  Alb  2.9<L>  /  TBili  0.2  /  DBili  x   /  AST  13<L>  /  ALT  24  /  AlkPhos  82  02-22      CARDIAC MARKERS ( 2023 00:10 )  x     / x     / 99 U/L / x     / x          Urinalysis Basic - ( 2023 04:15 )    Color: Yellow / Appearance: Clear / S.015 / pH: x  Gluc: x / Ketone: Negative  / Bili: Negative / Urobili: Negative mg/dL   Blood: x / Protein: 15 mg/dL / Nitrite: Negative   Leuk Esterase: Trace / RBC: Negative /HPF / WBC 3-5   Sq Epi: x / Non Sq Epi: Occasional / Bacteria: Few                      Radiology and Imaging reviewed.

## 2023-02-22 NOTE — PROGRESS NOTE ADULT - ASSESSMENT
67 years old female with h/o sciatica pain present to ED with fall. Patient reported right sided sciatica pain, associated with numbness and weakness of right lower extremity. Patient first noted weakness/numbness around 6-7 months ago, which appear worsened over last one month. Patient ambulate independently and live alone. Yesterday, patient reported fall due to RLE weakness and unable to get up. No loss of bladder or bowel control.   Hemodynamically stable, afebrile, sat well at RA. No leukocytosis, K 3.9, Cr 0.71, CK 99. UA negative for UTI. CT head with no acute pathology. CT C spine with no fracture or malalignment. CT pelvis with no fracture or dislocation. CT T spine with no fracture. L spine noted multilevel degenerative changes. Moderate spinal canal stenosis at L5-S1, appears grossly unchanged from 12/7/2022      Admitted with fall clinically stable for rehab.     Problem/Plan - 1:  ·  Problem: Fall.   ·  Plan: present with fall  CT head with no acute pathology. CT C spine with no fracture or malalignment. CT pelvis with no fracture or dislocation. CT T spine with no fracture. L spine noted multilevel degenerative changes. Moderate spinal canal stenosis at L5-S1, appears grossly unchanged from 12/7/2022  Ortho consulted  PT consult  Fall precaution  Continue gabapentin.    Problem/Plan - 2:  ·  Problem: Low back pain with right-sided sciatica.   ·  Plan: right sided sciatica pain with worsening RLE weakness/numbness.   CT T spine with no fracture. L spine noted multilevel degenerative changes. Moderate spinal canal stenosis at L5-S1, appears grossly unchanged from 12/7/2022  MRI L spine  Ortho consulted.    Discharge planning: [facility]

## 2023-02-23 PROCEDURE — 99233 SBSQ HOSP IP/OBS HIGH 50: CPT

## 2023-02-23 PROCEDURE — 93010 ELECTROCARDIOGRAM REPORT: CPT

## 2023-02-23 RX ORDER — MAGNESIUM HYDROXIDE 400 MG/1
30 TABLET, CHEWABLE ORAL DAILY
Refills: 0 | Status: DISCONTINUED | OUTPATIENT
Start: 2023-02-23 | End: 2023-02-26

## 2023-02-23 RX ADMIN — Medication 4 MILLIGRAM(S): at 17:08

## 2023-02-23 RX ADMIN — Medication 4 MILLIGRAM(S): at 05:29

## 2023-02-23 RX ADMIN — GABAPENTIN 300 MILLIGRAM(S): 400 CAPSULE ORAL at 13:08

## 2023-02-23 RX ADMIN — GABAPENTIN 300 MILLIGRAM(S): 400 CAPSULE ORAL at 23:35

## 2023-02-23 RX ADMIN — MAGNESIUM HYDROXIDE 30 MILLILITER(S): 400 TABLET, CHEWABLE ORAL at 17:14

## 2023-02-23 RX ADMIN — Medication 4 MILLIGRAM(S): at 23:28

## 2023-02-23 RX ADMIN — HEPARIN SODIUM 5000 UNIT(S): 5000 INJECTION INTRAVENOUS; SUBCUTANEOUS at 23:35

## 2023-02-23 RX ADMIN — Medication 3 MILLIGRAM(S): at 23:35

## 2023-02-23 RX ADMIN — Medication 4 MILLIGRAM(S): at 11:11

## 2023-02-23 RX ADMIN — GABAPENTIN 300 MILLIGRAM(S): 400 CAPSULE ORAL at 05:29

## 2023-02-23 RX ADMIN — HEPARIN SODIUM 5000 UNIT(S): 5000 INJECTION INTRAVENOUS; SUBCUTANEOUS at 05:29

## 2023-02-23 RX ADMIN — HEPARIN SODIUM 5000 UNIT(S): 5000 INJECTION INTRAVENOUS; SUBCUTANEOUS at 13:08

## 2023-02-23 NOTE — PROGRESS NOTE ADULT - SUBJECTIVE AND OBJECTIVE BOX
Patient is a 67y old  Female who presents with a chief complaint of fall, sciatica pain with RLE weakness/numbness (23 Feb 2023 06:00)    INTERVAL HPI/OVERNIGHT EVENTS: Patients seen and examined at bedside this morning. No acute events overnight. Pt reports she wants to discuss surgery with family. Reports no BM in 2 days.    MEDICATIONS  (STANDING):  dexAMETHasone     Tablet 4 milliGRAM(s) Oral every 6 hours  gabapentin 300 milliGRAM(s) Oral three times a day  heparin   Injectable 5000 Unit(s) SubCutaneous every 8 hours  influenza  Vaccine (HIGH DOSE) 0.7 milliLiter(s) IntraMuscular once    MEDICATIONS  (PRN):  acetaminophen     Tablet .. 650 milliGRAM(s) Oral every 6 hours PRN Temp greater or equal to 38C (100.4F), Mild Pain (1 - 3), Moderate Pain (4 - 6)  cyclobenzaprine 10 milliGRAM(s) Oral three times a day PRN Muscle Spasm  magnesium hydroxide Suspension 30 milliLiter(s) Oral daily PRN Constipation  melatonin 3 milliGRAM(s) Oral at bedtime PRN Insomnia    Allergies    No Known Allergies    Intolerances      REVIEW OF SYSTEMS:  All other systems reviewed and are negative    Vital Signs Last 24 Hrs  T(C): 36.8 (23 Feb 2023 05:14), Max: 36.8 (23 Feb 2023 05:14)  T(F): 98.2 (23 Feb 2023 05:14), Max: 98.2 (23 Feb 2023 05:14)  HR: 71 (23 Feb 2023 05:14) (65 - 91)  BP: 142/83 (23 Feb 2023 05:14) (127/82 - 142/83)  BP(mean): --  RR: 18 (23 Feb 2023 05:14) (18 - 18)  SpO2: 97% (23 Feb 2023 05:14) (95% - 97%)      Daily     Daily   I&O's Summary    22 Feb 2023 07:01  -  23 Feb 2023 07:00  --------------------------------------------------------  IN: 600 mL / OUT: 1800 mL / NET: -1200 mL      CAPILLARY BLOOD GLUCOSE        PHYSICAL EXAM:  GENERAL: NAD, well-groomed, well-developed  HEAD:  Atraumatic, Normocephalic  EYES: EOMI, PERRLA, conjunctiva and sclera clear  ENMT: No tonsillar erythema, exudates, or enlargement; Moist mucous membranes, Good dentition, No lesions  NECK: Supple, No JVD, Normal thyroid  NERVOUS SYSTEM:  Alert & Oriented X2, Good concentration; Motor Strength 3/5 B/L upper and lower extremities; DTRs 2+ intact and symmetric  CHEST/LUNG: Clear to percussion bilaterally; No rales, rhonchi, wheezing, or rubs  HEART: Regular rate and rhythm; No murmurs, rubs, or gallops  ABDOMEN: Soft, Nontender, Nondistended; Bowel sounds present  EXTREMITIES:  2+ Peripheral Pulses, No clubbing, cyanosis, or edema  LYMPH: No lymphadenopathy noted  SKIN: No rashes or lesions    Labs                          13.6   6.33  )-----------( 256      ( 22 Feb 2023 07:38 )             39.9     02-22    139  |  107  |  14  ----------------------------<  116<H>  4.1   |  24  |  0.60    Ca    9.2      22 Feb 2023 07:38  Phos  3.8     02-22    TPro  7.2  /  Alb  2.9<L>  /  TBili  0.2  /  DBili  x   /  AST  13<L>  /  ALT  24  /  AlkPhos  82  02-22                            Radiology and Imaging reviewed.

## 2023-02-23 NOTE — PROGRESS NOTE ADULT - ASSESSMENT
67 years old female with h/o sciatica pain present to ED with fall. Patient reported right sided sciatica pain, associated with numbness and weakness of right lower extremity. Patient first noted weakness/numbness around 6-7 months ago, which appear worsened over last one month. Patient ambulate independently and live alone. Yesterday, patient reported fall due to RLE weakness and unable to get up. No loss of bladder or bowel control.   Hemodynamically stable, afebrile, sat well at RA. No leukocytosis, K 3.9, Cr 0.71, CK 99. UA negative for UTI. CT head with no acute pathology. CT C spine with no fracture or malalignment. CT pelvis with no fracture or dislocation. CT T spine with no fracture. L spine noted multilevel degenerative changes. Moderate spinal canal stenosis at L5-S1, appears grossly unchanged from 12/7/2022      Admitted with fall clinically stable awaiting decision regarding ortho procedure     Problem/Plan - 1:  ·  Problem: Fall.   ·  Plan: present with fall  CT head with no acute pathology. CT C spine with no fracture or malalignment. CT pelvis with no fracture or dislocation. CT T spine with no fracture. L spine noted multilevel degenerative changes. Moderate spinal canal stenosis at L5-S1, appears grossly unchanged from 12/7/2022  (2/23) Ortho consulted: Family meeting as per ortho regarding surgical intervention  MRI reviewed:   PT consult  Fall precaution  Continue gabapentin.    Problem/Plan - 2:  ·  Problem: Low back pain with right-sided sciatica.   ·  Plan: right sided sciatica pain with worsening RLE weakness/numbness.   CT T spine with no fracture. L spine noted multilevel degenerative changes. Moderate spinal canal stenosis at L5-S1, appears grossly unchanged from 12/7/2022  MRI L spine reivewed  Ortho consulted.    Problem/Plan - 3:  ·  Problem: Constipation  milk of mag    Discharge planning: [facility] pending ortho surgical eval discussion.

## 2023-02-23 NOTE — PROGRESS NOTE ADULT - SUBJECTIVE AND OBJECTIVE BOX
Orthopaedic Surgery: Progress Note    Patient interviewed and examined at bedside, well-appearing and in no acute distress. Pain controlled. Endorses pain, weakness, numbness, and tingling in the Right upper extremity; denies pain, weakness, numbness, and tingling in the Left upper extremity. Endorse pain, weakness, numbness, and tingling in the bilateral (Right>Left) lower extremities. Denies fevers, chills, headaches, chest pain, or shortness of breath. Endorses mobile bump consistent with lipoma over the posterior upper arm, superficial to the triceps. Patient now with MRI findings consistent with disc herniations in the cervical spine and cord compression in the lumbar spine. Discussed with patient, who included via telephone her cousin, Bhargav Harris, who she wished to have participate in translation ([place] citizen-[place]) and care decision-making. Discussed with patient indication for addressing her pathologies with cervical and lumbar surgical intervention, respectively, specifically with C4-6 Anterior Cervical Discectomy and Fusion and L5-1 Decompression Laminectomy with Possible Fusion. Discussed indications, risks, benefits, and alternatives with patient and family member. Patient deferring both surgeries at present and wishes to hold a family meeting on Friday 2/24 when Bhargav as well as her sisters (visiting from [place]) are able to be at bedside to have all questions answered and concerns addressed.       VITALS:  T(C): 36.8 (23 Feb 2023 05:14), Max: 37 (22 Feb 2023 12:13)  T(F): 98.2 (23 Feb 2023 05:14), Max: 98.6 (22 Feb 2023 12:13)  HR: 71 (23 Feb 2023 05:14) (65 - 93)  BP: 142/83 (23 Feb 2023 05:14) (127/82 - 142/83)  RR: 18 (23 Feb 2023 05:14) (17 - 18)  SpO2: 97% (23 Feb 2023 05:14) (95% - 97%)        LABS:                        13.6   6.33  )-----------( 256      ( 22 Feb 2023 07:38 )             39.9     02-22    139  |  107  |  14  ----------------------------<  116<H>  4.1   |  24  |  0.60    Ca    9.2      22 Feb 2023 07:38  Phos  3.8     02-22  Mg     2.4     02-21    TPro  7.2  /  Alb  2.9<L>  /  TBili  0.2  /  DBili  x   /  AST  13<L>  /  ALT  24  /  AlkPhos  82  02-22        PHYSICAL EXAMINATION:  General: NAD  Spine:  No palpable step off. Nontender to palpation.     Motor:                   C5                C6              C7               C8           T1   R            4/5                5/5            5/5             5/5          5/5  L             5/5               5/5             5/5             5/5          5/5                L2             L3             L4               L5            S1  R         4/5           4/5          5/5             5/5           5/5  L          4/5          4/5           5/5             5/5           5/5    Sensory:            C5         C6         C7      C8       T1        (0=absent, 1=impaired, 2=normal, NT=not testable)  R         1            1           1        1         1  L          2            2           2        2         2               L2          L3         L4      L5       S1         (0=absent, 1=impaired, 2=normal, NT=not testable)  R         1            1            1        1        1  L          2            2           2        2         2    +SLR RLE (pain with passive motion only)  -SLR LLE    UMNs:    Babinski (-) B/L  Clonus (-) B/L  Perez (-) B/L               IMAGING:  CT C Sp shows C4-5, C6-7 disc osteophyte complexes compressing the thecal sac ventrally  CT L Sp shows mild b/l neuroforaminal stenosis at L5-S1, mod canal stenosis    MR Cervical Spine: Disc bulges at C4-5 and C5-6 narrow the cervical canal and contact the spinal cord without cord abnormality.    MR Lumbar Spine: Advanced degenerative changes at L5-S1 severely narrow the thecal sac and compress the cauda equina nerves. Severe bilateral foraminal stenosis is also demonstrated L5-S1.          ASSESSMENT:  67F with MR imaging demonstrating C4-6 disc bulges and L5-S1 cord compression     PLAN:   - Patient offered staged L5-S1 Laminectomy +/- Fusion and C4-6 Anterior Cervical Discectomy and Fusion; Will make decision pending family meeting on 2/24/23.   - Please document Medical/Other clearance(s)/optimization in anticipation of probable OR.   - WBAT BLLE/PT/OT  - DVT ppx: Per primary team  - Discussed with Dr. Joe who is aware of and in agreement of the above plan.

## 2023-02-24 LAB
ANION GAP SERPL CALC-SCNC: 7 MMOL/L — SIGNIFICANT CHANGE UP (ref 5–17)
BUN SERPL-MCNC: 21 MG/DL — SIGNIFICANT CHANGE UP (ref 7–23)
CALCIUM SERPL-MCNC: 9.3 MG/DL — SIGNIFICANT CHANGE UP (ref 8.5–10.1)
CHLORIDE SERPL-SCNC: 109 MMOL/L — HIGH (ref 96–108)
CO2 SERPL-SCNC: 24 MMOL/L — SIGNIFICANT CHANGE UP (ref 22–31)
CREAT SERPL-MCNC: 0.78 MG/DL — SIGNIFICANT CHANGE UP (ref 0.5–1.3)
EGFR: 83 ML/MIN/1.73M2 — SIGNIFICANT CHANGE UP
GLUCOSE SERPL-MCNC: 125 MG/DL — HIGH (ref 70–99)
HCT VFR BLD CALC: 42.1 % — SIGNIFICANT CHANGE UP (ref 34.5–45)
HGB BLD-MCNC: 14.1 G/DL — SIGNIFICANT CHANGE UP (ref 11.5–15.5)
MCHC RBC-ENTMCNC: 30.5 PG — SIGNIFICANT CHANGE UP (ref 27–34)
MCHC RBC-ENTMCNC: 33.5 G/DL — SIGNIFICANT CHANGE UP (ref 32–36)
MCV RBC AUTO: 90.9 FL — SIGNIFICANT CHANGE UP (ref 80–100)
NRBC # BLD: 0 /100 WBCS — SIGNIFICANT CHANGE UP (ref 0–0)
PLATELET # BLD AUTO: 264 K/UL — SIGNIFICANT CHANGE UP (ref 150–400)
POTASSIUM SERPL-MCNC: 4 MMOL/L — SIGNIFICANT CHANGE UP (ref 3.5–5.3)
POTASSIUM SERPL-SCNC: 4 MMOL/L — SIGNIFICANT CHANGE UP (ref 3.5–5.3)
RBC # BLD: 4.63 M/UL — SIGNIFICANT CHANGE UP (ref 3.8–5.2)
RBC # FLD: 12.8 % — SIGNIFICANT CHANGE UP (ref 10.3–14.5)
SODIUM SERPL-SCNC: 140 MMOL/L — SIGNIFICANT CHANGE UP (ref 135–145)
WBC # BLD: 11.55 K/UL — HIGH (ref 3.8–10.5)
WBC # FLD AUTO: 11.55 K/UL — HIGH (ref 3.8–10.5)

## 2023-02-24 PROCEDURE — 99232 SBSQ HOSP IP/OBS MODERATE 35: CPT

## 2023-02-24 RX ADMIN — GABAPENTIN 300 MILLIGRAM(S): 400 CAPSULE ORAL at 21:48

## 2023-02-24 RX ADMIN — HEPARIN SODIUM 5000 UNIT(S): 5000 INJECTION INTRAVENOUS; SUBCUTANEOUS at 15:03

## 2023-02-24 RX ADMIN — Medication 10 MILLIGRAM(S): at 06:58

## 2023-02-24 RX ADMIN — Medication 4 MILLIGRAM(S): at 23:08

## 2023-02-24 RX ADMIN — HEPARIN SODIUM 5000 UNIT(S): 5000 INJECTION INTRAVENOUS; SUBCUTANEOUS at 21:48

## 2023-02-24 RX ADMIN — Medication 4 MILLIGRAM(S): at 17:42

## 2023-02-24 RX ADMIN — HEPARIN SODIUM 5000 UNIT(S): 5000 INJECTION INTRAVENOUS; SUBCUTANEOUS at 06:18

## 2023-02-24 RX ADMIN — Medication 4 MILLIGRAM(S): at 11:06

## 2023-02-24 RX ADMIN — Medication 4 MILLIGRAM(S): at 06:17

## 2023-02-24 RX ADMIN — GABAPENTIN 300 MILLIGRAM(S): 400 CAPSULE ORAL at 06:18

## 2023-02-24 RX ADMIN — GABAPENTIN 300 MILLIGRAM(S): 400 CAPSULE ORAL at 15:02

## 2023-02-24 NOTE — PROGRESS NOTE ADULT - ASSESSMENT
67 years old female with h/o sciatica pain present to ED with fall. Patient reported right sided sciatica pain, associated with numbness and weakness of right lower extremity. Patient first noted weakness/numbness around 6-7 months ago, which appear worsened over last one month. Patient ambulate independently and live alone. Yesterday, patient reported fall due to RLE weakness and unable to get up. No loss of bladder or bowel control.   Hemodynamically stable, afebrile, sat well at RA. No leukocytosis, K 3.9, Cr 0.71, CK 99. UA negative for UTI. CT head with no acute pathology. CT C spine with no fracture or malalignment. CT pelvis with no fracture or dislocation. CT T spine with no fracture. L spine noted multilevel degenerative changes. Moderate spinal canal stenosis at L5-S1, appears grossly unchanged from 12/7/2022      Admitted with fall clinically stable awaiting decision regarding ortho procedure     Problem/Plan - 1:  ·  Problem: Fall.   ·  Plan: present with fall  CT head with no acute pathology. CT C spine with no fracture or malalignment. CT pelvis with no fracture or dislocation. CT T spine with no fracture. L spine noted multilevel degenerative changes. Moderate spinal canal stenosis at L5-S1, appears grossly unchanged from 12/7/2022  (2/23) Ortho consulted: Family meeting as per ortho regarding surgical intervention  MRI reviewed: Cervical spine: Disc bulges at C4-5 and C5-6 narrow the cervical canal   and contact the spinal cord without cord abnormality.  Lumbar spine: Advanced degenerative changes at L5-S1 severely narrow the   thecal sac and compress the cauda equina nerves. Severe bilateral   foraminal stenosis is also demonstrated L5-S1  PT consult  (2/24) Ortho to have discussion regarding surgical procedure inpatient vs. outpatient follow up.   Fall precaution  Continue gabapentin.    Problem/Plan - 2:  ·  Problem: Low back pain with right-sided sciatica.   ·  Plan: right sided sciatica pain with worsening RLE weakness/numbness.   CT T spine with no fracture. L spine noted multilevel degenerative changes. Moderate spinal canal stenosis at L5-S1, appears grossly unchanged from 12/7/2022  MRI L spine reivewed  Ortho consulted.    Problem/Plan - 3:  ·  Problem: Constipation  milk of mag    Discharge planning: [facility] pending ortho surgical eval discussion.       67 years old female with h/o sciatica pain present to ED with fall. Patient reported right sided sciatica pain, associated with numbness and weakness of right lower extremity. Patient first noted weakness/numbness around 6-7 months ago, which appear worsened over last one month. Patient ambulate independently and live alone. Yesterday, patient reported fall due to RLE weakness and unable to get up. No loss of bladder or bowel control.   Hemodynamically stable, afebrile, sat well at RA. No leukocytosis, K 3.9, Cr 0.71, CK 99. UA negative for UTI. CT head with no acute pathology. CT C spine with no fracture or malalignment. CT pelvis with no fracture or dislocation. CT T spine with no fracture. L spine noted multilevel degenerative changes. Moderate spinal canal stenosis at L5-S1, appears grossly unchanged from 12/7/2022      Admitted with fall clinically stable awaiting decision regarding ortho procedure     Problem/Plan - 1:  ·  Problem: Fall.   ·  Plan: present with fall  CT head with no acute pathology. CT C spine with no fracture or malalignment. CT pelvis with no fracture or dislocation. CT T spine with no fracture. L spine noted multilevel degenerative changes. Moderate spinal canal stenosis at L5-S1, appears grossly unchanged from 12/7/2022  (2/23) Ortho consulted: Family meeting as per ortho regarding surgical intervention  MRI reviewed: Cervical spine: Disc bulges at C4-5 and C5-6 narrow the cervical canal   and contact the spinal cord without cord abnormality.  Lumbar spine: Advanced degenerative changes at L5-S1 severely narrow the   thecal sac and compress the cauda equina nerves. Severe bilateral   foraminal stenosis is also demonstrated L5-S1  PT consult  (2/24) Patient amendable for surgical intervention if needed. Pt is fully orientated and able to make own decisions.   Medically optimized for surgical procedure.   Ortho to have discussion regarding surgical procedure inpatient vs. outpatient follow up.   Fall precaution  Continue gabapentin.    Problem/Plan - 2:  ·  Problem: Low back pain with right-sided sciatica.   ·  Plan: right sided sciatica pain with worsening RLE weakness/numbness.   CT T spine with no fracture. L spine noted multilevel degenerative changes. Moderate spinal canal stenosis at L5-S1, appears grossly unchanged from 12/7/2022  MRI L spine reivewed  Ortho consulted.    Problem/Plan - 3:  ·  Problem: Constipation  milk of mag    Discharge planning: [facility] pending ortho surgical eval discussion.

## 2023-02-24 NOTE — PROGRESS NOTE ADULT - SUBJECTIVE AND OBJECTIVE BOX
Patient is a 67y old  Female who presents with a chief complaint of fall, sciatica pain with RLE weakness/numbness (24 Feb 2023 06:47)    INTERVAL HPI/OVERNIGHT EVENTS: Patients seen and examined at bedside this morning. No acute events overnight. Pt reports    MEDICATIONS  (STANDING):  dexAMETHasone     Tablet 4 milliGRAM(s) Oral every 6 hours  gabapentin 300 milliGRAM(s) Oral three times a day  heparin   Injectable 5000 Unit(s) SubCutaneous every 8 hours  influenza  Vaccine (HIGH DOSE) 0.7 milliLiter(s) IntraMuscular once    MEDICATIONS  (PRN):  acetaminophen     Tablet .. 650 milliGRAM(s) Oral every 6 hours PRN Temp greater or equal to 38C (100.4F), Mild Pain (1 - 3), Moderate Pain (4 - 6)  cyclobenzaprine 10 milliGRAM(s) Oral three times a day PRN Muscle Spasm  magnesium hydroxide Suspension 30 milliLiter(s) Oral daily PRN Constipation  melatonin 3 milliGRAM(s) Oral at bedtime PRN Insomnia    Allergies    No Known Allergies    Intolerances      REVIEW OF SYSTEMS:  All other systems reviewed and are negative    Vital Signs Last 24 Hrs  T(C): 36.8 (24 Feb 2023 05:01), Max: 37 (23 Feb 2023 23:49)  T(F): 98.2 (24 Feb 2023 05:01), Max: 98.6 (23 Feb 2023 23:49)  HR: 65 (24 Feb 2023 05:01) (64 - 87)  BP: 149/81 (24 Feb 2023 05:01) (114/72 - 149/81)  BP(mean): --  RR: 18 (24 Feb 2023 05:01) (18 - 19)  SpO2: 96% (24 Feb 2023 05:01) (95% - 98%)    Parameters below as of 24 Feb 2023 05:01  Patient On (Oxygen Delivery Method): room air      Daily     Daily   I&O's Summary    23 Feb 2023 07:01  -  24 Feb 2023 07:00  --------------------------------------------------------  IN: 0 mL / OUT: 1000 mL / NET: -1000 mL      CAPILLARY BLOOD GLUCOSE        PHYSICAL EXAM:  GENERAL: NAD, well-groomed, well-developed  HEAD:  Atraumatic, Normocephalic  EYES: EOMI, PERRLA, conjunctiva and sclera clear  ENMT: No tonsillar erythema, exudates, or enlargement; Moist mucous membranes, Good dentition, No lesions  NECK: Supple, No JVD, Normal thyroid  NERVOUS SYSTEM:  Alert & Oriented X2, Good concentration; Motor Strength 3/5 B/L upper and lower extremities; DTRs 2+ intact and symmetric  CHEST/LUNG: Clear to percussion bilaterally; No rales, rhonchi, wheezing, or rubs  HEART: Regular rate and rhythm; No murmurs, rubs, or gallops  ABDOMEN: Soft, Nontender, Nondistended; Bowel sounds present  EXTREMITIES:  2+ Peripheral Pulses, No clubbing, cyanosis, or edema  LYMPH: No lymphadenopathy noted  SKIN: No rashes or lesions      Labs                          14.1   11.55 )-----------( 264      ( 24 Feb 2023 05:50 )             42.1     02-24    140  |  109<H>  |  21  ----------------------------<  125<H>  4.0   |  24  |  0.78    Ca    9.3      24 Feb 2023 05:50                              Radiology and Imaging reviewed. Patient is a 67y old  Female who presents with a chief complaint of fall, sciatica pain with RLE weakness/numbness (24 Feb 2023 06:47)    INTERVAL HPI/OVERNIGHT EVENTS: Patients seen and examined at bedside this morning. No acute events overnight. Pt reports she would like to proceed with surgery.    MEDICATIONS  (STANDING):  dexAMETHasone     Tablet 4 milliGRAM(s) Oral every 6 hours  gabapentin 300 milliGRAM(s) Oral three times a day  heparin   Injectable 5000 Unit(s) SubCutaneous every 8 hours  influenza  Vaccine (HIGH DOSE) 0.7 milliLiter(s) IntraMuscular once    MEDICATIONS  (PRN):  acetaminophen     Tablet .. 650 milliGRAM(s) Oral every 6 hours PRN Temp greater or equal to 38C (100.4F), Mild Pain (1 - 3), Moderate Pain (4 - 6)  cyclobenzaprine 10 milliGRAM(s) Oral three times a day PRN Muscle Spasm  magnesium hydroxide Suspension 30 milliLiter(s) Oral daily PRN Constipation  melatonin 3 milliGRAM(s) Oral at bedtime PRN Insomnia    Allergies    No Known Allergies    Intolerances      REVIEW OF SYSTEMS:  All other systems reviewed and are negative    Vital Signs Last 24 Hrs  T(C): 36.8 (24 Feb 2023 05:01), Max: 37 (23 Feb 2023 23:49)  T(F): 98.2 (24 Feb 2023 05:01), Max: 98.6 (23 Feb 2023 23:49)  HR: 65 (24 Feb 2023 05:01) (64 - 87)  BP: 149/81 (24 Feb 2023 05:01) (114/72 - 149/81)  BP(mean): --  RR: 18 (24 Feb 2023 05:01) (18 - 19)  SpO2: 96% (24 Feb 2023 05:01) (95% - 98%)    Parameters below as of 24 Feb 2023 05:01  Patient On (Oxygen Delivery Method): room air      Daily     Daily   I&O's Summary    23 Feb 2023 07:01  -  24 Feb 2023 07:00  --------------------------------------------------------  IN: 0 mL / OUT: 1000 mL / NET: -1000 mL      CAPILLARY BLOOD GLUCOSE        PHYSICAL EXAM:  GENERAL: NAD, well-groomed, well-developed  HEAD:  Atraumatic, Normocephalic  EYES: EOMI, PERRLA, conjunctiva and sclera clear  ENMT: No tonsillar erythema, exudates, or enlargement; Moist mucous membranes, Good dentition, No lesions  NECK: Supple, No JVD, Normal thyroid  NERVOUS SYSTEM:  Alert & Oriented X2, Good concentration; Motor Strength 3/5 B/L upper and lower extremities; DTRs 2+ intact and symmetric  CHEST/LUNG: Clear to percussion bilaterally; No rales, rhonchi, wheezing, or rubs  HEART: Regular rate and rhythm; No murmurs, rubs, or gallops  ABDOMEN: Soft, Nontender, Nondistended; Bowel sounds present  EXTREMITIES:  2+ Peripheral Pulses, No clubbing, cyanosis, or edema  LYMPH: No lymphadenopathy noted  SKIN: No rashes or lesions      Labs                          14.1   11.55 )-----------( 264      ( 24 Feb 2023 05:50 )             42.1     02-24    140  |  109<H>  |  21  ----------------------------<  125<H>  4.0   |  24  |  0.78    Ca    9.3      24 Feb 2023 05:50                              Radiology and Imaging reviewed.

## 2023-02-24 NOTE — PROGRESS NOTE ADULT - SUBJECTIVE AND OBJECTIVE BOX
Patient seen and examined at bedside. Patient reports pain well controlled on medications. No acute events overnight. Endorse pain, weakness, numbness, and tingling in the bilateral (Right>Left) lower extremities. Denies fevers, chills, headaches, chest pain, or shortness of breath.  Patient now with MRI findings consistent with disc herniations in the cervical spine and cord compression in the lumbar spine. Discussed with patient, who included via telephone her cousin, Bhargav Harris, who she wished to have participate in translation (South African-Creole) and care decision-making. Discussed with patient indication for addressing her pathologies with cervical and lumbar surgical intervention, respectively, specifically with C4-6 Anterior Cervical Discectomy and Fusion and L5-1 Decompression Laminectomy with Possible Fusion. Discussed indications, risks, benefits, and alternatives with patient and family member. Patient deferring both surgeries at present and wishes to hold a family meeting on Friday 2/24 when Bhargav as well as her sisters (visiting from [place]) are able to be at bedside to have all questions answered and concerns addressed.     T(C): 36.8 (02-24-23 @ 05:01), Max: 37 (02-23-23 @ 23:49)  T(F): 98.2 (02-24-23 @ 05:01), Max: 98.6 (02-23-23 @ 23:49)  HR: 65 (02-24-23 @ 05:01) (64 - 87)  BP: 149/81 (02-24-23 @ 05:01) (114/72 - 149/81)  RR: 18 (02-24-23 @ 05:01) (18 - 19)  SpO2: 96% (02-24-23 @ 05:01) (95% - 98%)    PHYSICAL EXAMINATION:  General: NAD  Spine:  No palpable step off. Nontender to palpation.     Motor:                   C5                C6              C7               C8           T1   R            4/5                5/5            5/5             5/5          5/5  L             5/5               5/5             5/5             5/5          5/5                L2             L3             L4               L5            S1  R         4/5           4/5          5/5             5/5           5/5  L          4/5          4/5           5/5             5/5           5/5    Sensory:            C5         C6         C7      C8       T1        (0=absent, 1=impaired, 2=normal, NT=not testable)  R         1            1           1        1         1  L          2            2           2        2         2               L2          L3         L4      L5       S1         (0=absent, 1=impaired, 2=normal, NT=not testable)  R         1            1            1        1        1  L          2            2           2        2         2    +SLR RLE (pain with passive motion only)  -SLR LLE    UMNs:    Babinski (-) B/L  Clonus (-) B/L  Perez (-) B/L                          13.6   6.33  )-----------( 256      ( 22 Feb 2023 07:38 )             39.9   02-22    139  |  107  |  14  ----------------------------<  116<H>  4.1   |  24  |  0.60    Ca    9.2      22 Feb 2023 07:38  Phos  3.8     02-22    TPro  7.2  /  Alb  2.9<L>  /  TBili  0.2  /  DBili  x   /  AST  13<L>  /  ALT  24  /  AlkPhos  82  02-22    ASSESSMENT:  67F with MR imaging demonstrating C4-6 disc bulges and L5-S1 cord compression     PLAN:   - Patient offered staged L5-S1 Laminectomy +/- Fusion and C4-6 Anterior Cervical Discectomy and Fusion; Will make decision pending family meeting on 2/24/23.   - Please document Medical/Other clearance(s)/optimization in anticipation of probable OR.   - WBAT BLLE/PT/OT  - DVT ppx: Per primary team  - Discussed with Dr. Barkley who is aware of and in agreement of the above plan.

## 2023-02-25 LAB
ANION GAP SERPL CALC-SCNC: 8 MMOL/L — SIGNIFICANT CHANGE UP (ref 5–17)
BLD GP AB SCN SERPL QL: SIGNIFICANT CHANGE UP
BUN SERPL-MCNC: 24 MG/DL — HIGH (ref 7–23)
CALCIUM SERPL-MCNC: 8.8 MG/DL — SIGNIFICANT CHANGE UP (ref 8.5–10.1)
CHLORIDE SERPL-SCNC: 106 MMOL/L — SIGNIFICANT CHANGE UP (ref 96–108)
CO2 SERPL-SCNC: 24 MMOL/L — SIGNIFICANT CHANGE UP (ref 22–31)
CREAT SERPL-MCNC: 0.67 MG/DL — SIGNIFICANT CHANGE UP (ref 0.5–1.3)
EGFR: 96 ML/MIN/1.73M2 — SIGNIFICANT CHANGE UP
FLUAV AG NPH QL: SIGNIFICANT CHANGE UP
FLUBV AG NPH QL: SIGNIFICANT CHANGE UP
GLUCOSE SERPL-MCNC: 129 MG/DL — HIGH (ref 70–99)
HCT VFR BLD CALC: 40.3 % — SIGNIFICANT CHANGE UP (ref 34.5–45)
HGB BLD-MCNC: 13.3 G/DL — SIGNIFICANT CHANGE UP (ref 11.5–15.5)
MCHC RBC-ENTMCNC: 30.8 PG — SIGNIFICANT CHANGE UP (ref 27–34)
MCHC RBC-ENTMCNC: 33 G/DL — SIGNIFICANT CHANGE UP (ref 32–36)
MCV RBC AUTO: 93.3 FL — SIGNIFICANT CHANGE UP (ref 80–100)
NRBC # BLD: 0 /100 WBCS — SIGNIFICANT CHANGE UP (ref 0–0)
PLATELET # BLD AUTO: 274 K/UL — SIGNIFICANT CHANGE UP (ref 150–400)
POTASSIUM SERPL-MCNC: 4 MMOL/L — SIGNIFICANT CHANGE UP (ref 3.5–5.3)
POTASSIUM SERPL-SCNC: 4 MMOL/L — SIGNIFICANT CHANGE UP (ref 3.5–5.3)
RBC # BLD: 4.32 M/UL — SIGNIFICANT CHANGE UP (ref 3.8–5.2)
RBC # FLD: 12.9 % — SIGNIFICANT CHANGE UP (ref 10.3–14.5)
SARS-COV-2 RNA SPEC QL NAA+PROBE: SIGNIFICANT CHANGE UP
SODIUM SERPL-SCNC: 138 MMOL/L — SIGNIFICANT CHANGE UP (ref 135–145)
WBC # BLD: 14.19 K/UL — HIGH (ref 3.8–10.5)
WBC # FLD AUTO: 14.19 K/UL — HIGH (ref 3.8–10.5)

## 2023-02-25 PROCEDURE — 99233 SBSQ HOSP IP/OBS HIGH 50: CPT

## 2023-02-25 RX ORDER — SODIUM CHLORIDE 9 MG/ML
1000 INJECTION, SOLUTION INTRAVENOUS
Refills: 0 | Status: DISCONTINUED | OUTPATIENT
Start: 2023-02-25 | End: 2023-02-26

## 2023-02-25 RX ADMIN — GABAPENTIN 300 MILLIGRAM(S): 400 CAPSULE ORAL at 21:54

## 2023-02-25 RX ADMIN — Medication 4 MILLIGRAM(S): at 23:58

## 2023-02-25 RX ADMIN — HEPARIN SODIUM 5000 UNIT(S): 5000 INJECTION INTRAVENOUS; SUBCUTANEOUS at 05:39

## 2023-02-25 RX ADMIN — GABAPENTIN 300 MILLIGRAM(S): 400 CAPSULE ORAL at 05:39

## 2023-02-25 RX ADMIN — GABAPENTIN 300 MILLIGRAM(S): 400 CAPSULE ORAL at 13:53

## 2023-02-25 RX ADMIN — Medication 4 MILLIGRAM(S): at 12:12

## 2023-02-25 RX ADMIN — SODIUM CHLORIDE 150 MILLILITER(S): 9 INJECTION, SOLUTION INTRAVENOUS at 23:58

## 2023-02-25 RX ADMIN — HEPARIN SODIUM 5000 UNIT(S): 5000 INJECTION INTRAVENOUS; SUBCUTANEOUS at 21:54

## 2023-02-25 RX ADMIN — Medication 4 MILLIGRAM(S): at 17:36

## 2023-02-25 RX ADMIN — Medication 4 MILLIGRAM(S): at 05:39

## 2023-02-25 NOTE — PROGRESS NOTE ADULT - SUBJECTIVE AND OBJECTIVE BOX
Patient is a 67y old  Female who presents with a chief complaint of fall, sciatica pain with RLE weakness/numbness (24 Feb 2023 09:07)    INTERVAL HPI/OVERNIGHT EVENTS: Patients seen and examined at bedside this morning. No acute events overnight.     MEDICATIONS  (STANDING):  dexAMETHasone     Tablet 4 milliGRAM(s) Oral every 6 hours  gabapentin 300 milliGRAM(s) Oral three times a day  heparin   Injectable 5000 Unit(s) SubCutaneous every 8 hours  influenza  Vaccine (HIGH DOSE) 0.7 milliLiter(s) IntraMuscular once    MEDICATIONS  (PRN):  acetaminophen     Tablet .. 650 milliGRAM(s) Oral every 6 hours PRN Temp greater or equal to 38C (100.4F), Mild Pain (1 - 3), Moderate Pain (4 - 6)  cyclobenzaprine 10 milliGRAM(s) Oral three times a day PRN Muscle Spasm  magnesium hydroxide Suspension 30 milliLiter(s) Oral daily PRN Constipation  melatonin 3 milliGRAM(s) Oral at bedtime PRN Insomnia    Allergies    No Known Allergies    Intolerances      REVIEW OF SYSTEMS:  All other systems reviewed and are negative    Vital Signs Last 24 Hrs  T(C): 36.5 (25 Feb 2023 05:02), Max: 36.8 (24 Feb 2023 11:48)  T(F): 97.7 (25 Feb 2023 05:02), Max: 98.3 (24 Feb 2023 11:48)  HR: 57 (25 Feb 2023 05:02) (57 - 85)  BP: 144/87 (25 Feb 2023 05:02) (111/76 - 144/87)  BP(mean): --  RR: 18 (25 Feb 2023 05:02) (17 - 18)  SpO2: 96% (25 Feb 2023 05:02) (94% - 98%)    Parameters below as of 25 Feb 2023 05:02  Patient On (Oxygen Delivery Method): room air      Daily     Daily   I&O's Summary    24 Feb 2023 07:01  -  25 Feb 2023 07:00  --------------------------------------------------------  IN: 960 mL / OUT: 800 mL / NET: 160 mL      CAPILLARY BLOOD GLUCOSE        PHYSICAL EXAM:  GENERAL: NAD, well-groomed, well-developed  HEAD:  Atraumatic, Normocephalic  EYES: EOMI, PERRLA, conjunctiva and sclera clear  ENMT: No tonsillar erythema, exudates, or enlargement; Moist mucous membranes, Good dentition, No lesions  NECK: Supple, No JVD, Normal thyroid  NERVOUS SYSTEM:  Alert & Oriented X2, Good concentration; Motor Strength 3/5 B/L upper and lower extremities; DTRs 2+ intact and symmetric  CHEST/LUNG: Clear to percussion bilaterally; No rales, rhonchi, wheezing, or rubs  HEART: Regular rate and rhythm; No murmurs, rubs, or gallops  ABDOMEN: Soft, Nontender, Nondistended; Bowel sounds present  EXTREMITIES:  2+ Peripheral Pulses, No clubbing, cyanosis, or edema  LYMPH: No lymphadenopathy noted  SKIN: No rashes or lesions    Labs                          13.3   14.19 )-----------( 274      ( 25 Feb 2023 05:50 )             40.3     02-25    138  |  106  |  24<H>  ----------------------------<  129<H>  4.0   |  24  |  0.67    Ca    8.8      25 Feb 2023 05:50                              Radiology and Imaging reviewed.

## 2023-02-25 NOTE — PROGRESS NOTE ADULT - ASSESSMENT
67 years old female with h/o sciatica pain present to ED with fall. Patient reported right sided sciatica pain, associated with numbness and weakness of right lower extremity. Patient first noted weakness/numbness around 6-7 months ago, which appear worsened over last one month. Patient ambulate independently and live alone. Yesterday, patient reported fall due to RLE weakness and unable to get up. No loss of bladder or bowel control.   Hemodynamically stable, afebrile, sat well at RA. No leukocytosis, K 3.9, Cr 0.71, CK 99. UA negative for UTI. CT head with no acute pathology. CT C spine with no fracture or malalignment. CT pelvis with no fracture or dislocation. CT T spine with no fracture. L spine noted multilevel degenerative changes. Moderate spinal canal stenosis at L5-S1, appears grossly unchanged from 12/7/2022      Admitted with fall clinically stable awaiting decision regarding ortho procedure     Problem/Plan - 1:  ·  Problem: Fall.   ·  Plan: present with fall  CT head with no acute pathology. CT C spine with no fracture or malalignment. CT pelvis with no fracture or dislocation. CT T spine with no fracture. L spine noted multilevel degenerative changes. Moderate spinal canal stenosis at L5-S1, appears grossly unchanged from 12/7/2022  (2/23) Ortho consulted: Family meeting as per ortho regarding surgical intervention  MRI reviewed: Cervical spine: Disc bulges at C4-5 and C5-6 narrow the cervical canal   and contact the spinal cord without cord abnormality.  Lumbar spine: Advanced degenerative changes at L5-S1 severely narrow the   thecal sac and compress the cauda equina nerves. Severe bilateral   foraminal stenosis is also demonstrated L5-S1  PT consult  (2/24) Patient amendable for surgical intervention if needed. Pt is fully orientated and able to make own decisions.   Medically optimized for surgical procedure.   Ortho to have discussion regarding surgical procedure inpatient vs. outpatient follow up.   Fall precaution  Continue gabapentin.  (2/25) Ortho planning for laminectomy as per team.     Problem/Plan - 2:  ·  Problem: Low back pain with right-sided sciatica.   ·  Plan: right sided sciatica pain with worsening RLE weakness/numbness.   CT T spine with no fracture. L spine noted multilevel degenerative changes. Moderate spinal canal stenosis at L5-S1, appears grossly unchanged from 12/7/2022  MRI L spine reivewed  Ortho consulted.    Problem/Plan - 3:  ·  Problem: Constipation  milk of mag    Discharge planning: [facility] pending ortho surgical eval discussion.

## 2023-02-25 NOTE — PROGRESS NOTE ADULT - SUBJECTIVE AND OBJECTIVE BOX
Patient interviewed and examined at bedside, well-appearing and in no acute distress. Patient endorses continued cramping pain in the Right thigh and bilateral hands and fingers. Endorses "heartburn" from eating late but otherwise denies fevers, chills, headaches, chest pain, or shortness of breath.        VITAL SIGNS:  T(C): 36.5 (25 Feb 2023 05:02), Max: 36.8 (24 Feb 2023 11:48)  T(F): 97.7 (25 Feb 2023 05:02), Max: 98.3 (24 Feb 2023 11:48)  HR: 57 (25 Feb 2023 05:02) (57 - 85)  BP: 144/87 (25 Feb 2023 05:02) (111/76 - 144/87)  RR: 18 (25 Feb 2023 05:02) (17 - 18)  SpO2: 96% (25 Feb 2023 05:02) (94% - 98%)        LABS:                        13.3   14.19 )-----------( 274      ( 25 Feb 2023 05:50 )             40.3     02-25    138  |  106  |  24<H>  ----------------------------<  129<H>  4.0   |  24  |  0.67    Ca    8.8      25 Feb 2023 05:50        PHYSICAL EXAMINATION:  General: NAD, A&Ox3  Spine:  No palpable step off. Nontender to palpation.     Motor:                   C5                C6              C7               C8           T1   R            5/5                5/5            5/5             5/5          5/5  L             5/5               5/5             5/5             5/5          5/5                L2             L3             L4               L5            S1  R         5/5           5/5          5/5             5/5           5/5  L          4/5          4/5           4/5             4/5           5/5    Sensory:            C5         C6         C7      C8       T1        (0=absent, 1=impaired, 2=normal, NT=not testable)  R         1            1           1        1         1  L          2            2           2        2         2               L2          L3         L4      L5       S1         (0=absent, 1=impaired, 2=normal, NT=not testable)  R         1            1            1        1        1  L          2            2           2        2         2    +SLR RLE (pain with passive motion only)  -SLR LLE    UMNs:    Babinski (-) B/L  Clonus (-) B/L  Perez (-) B/L                 ASSESSMENT:  67F with MR imaging demonstrating C4-6 disc bulges and L5-S1 cord compression     PLAN:   - Plan for OR tomorrow (Sunday, 2/26/23) for L5-S1 Decompression with Possible Fusion pending confirmation with attending surgeon.   - Please document Medical/Other clearance(s)/optimization in anticipation of probable OR.   - NPO at midnight except medications.   - IVF while NPO.   - Hold chemical DVT prophylaxis at midnight.   - WBAT BLLE/PT/OT  - Will discuss with Dr. Ernst and advise of any changes to the above plan.

## 2023-02-26 LAB
ANION GAP SERPL CALC-SCNC: 5 MMOL/L — SIGNIFICANT CHANGE UP (ref 5–17)
ANION GAP SERPL CALC-SCNC: 6 MMOL/L — SIGNIFICANT CHANGE UP (ref 5–17)
APTT BLD: 29.9 SEC — SIGNIFICANT CHANGE UP (ref 27.5–35.5)
BASOPHILS # BLD AUTO: 0.03 K/UL — SIGNIFICANT CHANGE UP (ref 0–0.2)
BASOPHILS NFR BLD AUTO: 0.2 % — SIGNIFICANT CHANGE UP (ref 0–2)
BUN SERPL-MCNC: 17 MG/DL — SIGNIFICANT CHANGE UP (ref 7–23)
BUN SERPL-MCNC: 22 MG/DL — SIGNIFICANT CHANGE UP (ref 7–23)
CALCIUM SERPL-MCNC: 8.6 MG/DL — SIGNIFICANT CHANGE UP (ref 8.5–10.1)
CALCIUM SERPL-MCNC: 8.9 MG/DL — SIGNIFICANT CHANGE UP (ref 8.5–10.1)
CHLORIDE SERPL-SCNC: 107 MMOL/L — SIGNIFICANT CHANGE UP (ref 96–108)
CHLORIDE SERPL-SCNC: 108 MMOL/L — SIGNIFICANT CHANGE UP (ref 96–108)
CO2 SERPL-SCNC: 27 MMOL/L — SIGNIFICANT CHANGE UP (ref 22–31)
CO2 SERPL-SCNC: 28 MMOL/L — SIGNIFICANT CHANGE UP (ref 22–31)
CREAT SERPL-MCNC: 0.69 MG/DL — SIGNIFICANT CHANGE UP (ref 0.5–1.3)
CREAT SERPL-MCNC: 0.76 MG/DL — SIGNIFICANT CHANGE UP (ref 0.5–1.3)
EGFR: 86 ML/MIN/1.73M2 — SIGNIFICANT CHANGE UP
EGFR: 95 ML/MIN/1.73M2 — SIGNIFICANT CHANGE UP
EOSINOPHIL # BLD AUTO: 0 K/UL — SIGNIFICANT CHANGE UP (ref 0–0.5)
EOSINOPHIL NFR BLD AUTO: 0 % — SIGNIFICANT CHANGE UP (ref 0–6)
GLUCOSE SERPL-MCNC: 156 MG/DL — HIGH (ref 70–99)
GLUCOSE SERPL-MCNC: 173 MG/DL — HIGH (ref 70–99)
HCT VFR BLD CALC: 39.4 % — SIGNIFICANT CHANGE UP (ref 34.5–45)
HCT VFR BLD CALC: 41.3 % — SIGNIFICANT CHANGE UP (ref 34.5–45)
HGB BLD-MCNC: 13 G/DL — SIGNIFICANT CHANGE UP (ref 11.5–15.5)
HGB BLD-MCNC: 14 G/DL — SIGNIFICANT CHANGE UP (ref 11.5–15.5)
IMM GRANULOCYTES NFR BLD AUTO: 1.1 % — HIGH (ref 0–0.9)
INR BLD: 1 RATIO — SIGNIFICANT CHANGE UP (ref 0.88–1.16)
LYMPHOCYTES # BLD AUTO: 1.83 K/UL — SIGNIFICANT CHANGE UP (ref 1–3.3)
LYMPHOCYTES # BLD AUTO: 11.1 % — LOW (ref 13–44)
MCHC RBC-ENTMCNC: 30.9 PG — SIGNIFICANT CHANGE UP (ref 27–34)
MCHC RBC-ENTMCNC: 31 PG — SIGNIFICANT CHANGE UP (ref 27–34)
MCHC RBC-ENTMCNC: 33 G/DL — SIGNIFICANT CHANGE UP (ref 32–36)
MCHC RBC-ENTMCNC: 33.9 G/DL — SIGNIFICANT CHANGE UP (ref 32–36)
MCV RBC AUTO: 91.4 FL — SIGNIFICANT CHANGE UP (ref 80–100)
MCV RBC AUTO: 93.6 FL — SIGNIFICANT CHANGE UP (ref 80–100)
MONOCYTES # BLD AUTO: 1.79 K/UL — HIGH (ref 0–0.9)
MONOCYTES NFR BLD AUTO: 10.8 % — SIGNIFICANT CHANGE UP (ref 2–14)
NEUTROPHILS # BLD AUTO: 12.71 K/UL — HIGH (ref 1.8–7.4)
NEUTROPHILS NFR BLD AUTO: 76.8 % — SIGNIFICANT CHANGE UP (ref 43–77)
NRBC # BLD: 0 /100 WBCS — SIGNIFICANT CHANGE UP (ref 0–0)
PLATELET # BLD AUTO: 242 K/UL — SIGNIFICANT CHANGE UP (ref 150–400)
PLATELET # BLD AUTO: 266 K/UL — SIGNIFICANT CHANGE UP (ref 150–400)
POTASSIUM SERPL-MCNC: 4.6 MMOL/L — SIGNIFICANT CHANGE UP (ref 3.5–5.3)
POTASSIUM SERPL-MCNC: 4.8 MMOL/L — SIGNIFICANT CHANGE UP (ref 3.5–5.3)
POTASSIUM SERPL-SCNC: 4.6 MMOL/L — SIGNIFICANT CHANGE UP (ref 3.5–5.3)
POTASSIUM SERPL-SCNC: 4.8 MMOL/L — SIGNIFICANT CHANGE UP (ref 3.5–5.3)
PROTHROM AB SERPL-ACNC: 12 SEC — SIGNIFICANT CHANGE UP (ref 10.5–13.4)
RBC # BLD: 4.21 M/UL — SIGNIFICANT CHANGE UP (ref 3.8–5.2)
RBC # BLD: 4.52 M/UL — SIGNIFICANT CHANGE UP (ref 3.8–5.2)
RBC # FLD: 12.8 % — SIGNIFICANT CHANGE UP (ref 10.3–14.5)
RBC # FLD: 12.9 % — SIGNIFICANT CHANGE UP (ref 10.3–14.5)
SODIUM SERPL-SCNC: 140 MMOL/L — SIGNIFICANT CHANGE UP (ref 135–145)
SODIUM SERPL-SCNC: 141 MMOL/L — SIGNIFICANT CHANGE UP (ref 135–145)
WBC # BLD: 14.26 K/UL — HIGH (ref 3.8–10.5)
WBC # BLD: 16.54 K/UL — HIGH (ref 3.8–10.5)
WBC # FLD AUTO: 14.26 K/UL — HIGH (ref 3.8–10.5)
WBC # FLD AUTO: 16.54 K/UL — HIGH (ref 3.8–10.5)

## 2023-02-26 PROCEDURE — 99232 SBSQ HOSP IP/OBS MODERATE 35: CPT

## 2023-02-26 DEVICE — GRAFT BONE ACTIFUSE 90X25X7MM SHAPE LG STRIP: Type: IMPLANTABLE DEVICE | Status: FUNCTIONAL

## 2023-02-26 DEVICE — SCREW LOCKG OPEN TULIP RELINE 5.5MM: Type: IMPLANTABLE DEVICE | Status: FUNCTIONAL

## 2023-02-26 DEVICE — IMPLANTABLE DEVICE: Type: IMPLANTABLE DEVICE | Status: FUNCTIONAL

## 2023-02-26 DEVICE — GRAFT BONE INFUSE KIT MED: Type: IMPLANTABLE DEVICE | Status: FUNCTIONAL

## 2023-02-26 DEVICE — SCREW RELINE MAS POLY 6.5X45MM: Type: IMPLANTABLE DEVICE | Status: FUNCTIONAL

## 2023-02-26 RX ORDER — OXYCODONE HYDROCHLORIDE 5 MG/1
5 TABLET ORAL EVERY 4 HOURS
Refills: 0 | Status: DISCONTINUED | OUTPATIENT
Start: 2023-02-26 | End: 2023-03-03

## 2023-02-26 RX ORDER — ONDANSETRON 8 MG/1
4 TABLET, FILM COATED ORAL EVERY 6 HOURS
Refills: 0 | Status: DISCONTINUED | OUTPATIENT
Start: 2023-02-26 | End: 2023-03-03

## 2023-02-26 RX ORDER — POLYETHYLENE GLYCOL 3350 17 G/17G
17 POWDER, FOR SOLUTION ORAL DAILY
Refills: 0 | Status: DISCONTINUED | OUTPATIENT
Start: 2023-02-26 | End: 2023-03-03

## 2023-02-26 RX ORDER — CYCLOBENZAPRINE HYDROCHLORIDE 10 MG/1
10 TABLET, FILM COATED ORAL EVERY 8 HOURS
Refills: 0 | Status: DISCONTINUED | OUTPATIENT
Start: 2023-02-26 | End: 2023-03-03

## 2023-02-26 RX ORDER — PANTOPRAZOLE SODIUM 20 MG/1
40 TABLET, DELAYED RELEASE ORAL
Refills: 0 | Status: DISCONTINUED | OUTPATIENT
Start: 2023-02-26 | End: 2023-03-03

## 2023-02-26 RX ORDER — ASCORBIC ACID 60 MG
500 TABLET,CHEWABLE ORAL
Refills: 0 | Status: DISCONTINUED | OUTPATIENT
Start: 2023-02-26 | End: 2023-03-03

## 2023-02-26 RX ORDER — CEFAZOLIN SODIUM 1 G
2000 VIAL (EA) INJECTION EVERY 8 HOURS
Refills: 0 | Status: COMPLETED | OUTPATIENT
Start: 2023-02-26 | End: 2023-02-27

## 2023-02-26 RX ORDER — HYDROMORPHONE HYDROCHLORIDE 2 MG/ML
0.5 INJECTION INTRAMUSCULAR; INTRAVENOUS; SUBCUTANEOUS ONCE
Refills: 0 | Status: DISCONTINUED | OUTPATIENT
Start: 2023-02-26 | End: 2023-03-03

## 2023-02-26 RX ORDER — SENNA PLUS 8.6 MG/1
2 TABLET ORAL AT BEDTIME
Refills: 0 | Status: DISCONTINUED | OUTPATIENT
Start: 2023-02-26 | End: 2023-03-03

## 2023-02-26 RX ORDER — FENTANYL CITRATE 50 UG/ML
25 INJECTION INTRAVENOUS
Refills: 0 | Status: DISCONTINUED | OUTPATIENT
Start: 2023-02-26 | End: 2023-02-26

## 2023-02-26 RX ORDER — MAGNESIUM HYDROXIDE 400 MG/1
30 TABLET, CHEWABLE ORAL EVERY 12 HOURS
Refills: 0 | Status: DISCONTINUED | OUTPATIENT
Start: 2023-02-26 | End: 2023-03-03

## 2023-02-26 RX ORDER — ACETAMINOPHEN 500 MG
650 TABLET ORAL EVERY 6 HOURS
Refills: 0 | Status: DISCONTINUED | OUTPATIENT
Start: 2023-02-26 | End: 2023-03-03

## 2023-02-26 RX ORDER — SODIUM CHLORIDE 9 MG/ML
1000 INJECTION, SOLUTION INTRAVENOUS
Refills: 0 | Status: DISCONTINUED | OUTPATIENT
Start: 2023-02-26 | End: 2023-03-01

## 2023-02-26 RX ORDER — TRAMADOL HYDROCHLORIDE 50 MG/1
50 TABLET ORAL EVERY 6 HOURS
Refills: 0 | Status: DISCONTINUED | OUTPATIENT
Start: 2023-02-26 | End: 2023-03-03

## 2023-02-26 RX ORDER — OXYCODONE HYDROCHLORIDE 5 MG/1
2.5 TABLET ORAL EVERY 4 HOURS
Refills: 0 | Status: DISCONTINUED | OUTPATIENT
Start: 2023-02-26 | End: 2023-03-03

## 2023-02-26 RX ORDER — FOLIC ACID 0.8 MG
1 TABLET ORAL DAILY
Refills: 0 | Status: DISCONTINUED | OUTPATIENT
Start: 2023-02-26 | End: 2023-03-03

## 2023-02-26 RX ADMIN — Medication 500 MILLIGRAM(S): at 18:06

## 2023-02-26 RX ADMIN — SENNA PLUS 2 TABLET(S): 8.6 TABLET ORAL at 21:26

## 2023-02-26 RX ADMIN — OXYCODONE HYDROCHLORIDE 5 MILLIGRAM(S): 5 TABLET ORAL at 18:05

## 2023-02-26 RX ADMIN — GABAPENTIN 300 MILLIGRAM(S): 400 CAPSULE ORAL at 05:51

## 2023-02-26 RX ADMIN — Medication 650 MILLIGRAM(S): at 18:06

## 2023-02-26 RX ADMIN — Medication 4 MILLIGRAM(S): at 05:51

## 2023-02-26 RX ADMIN — OXYCODONE HYDROCHLORIDE 5 MILLIGRAM(S): 5 TABLET ORAL at 18:30

## 2023-02-26 RX ADMIN — CYCLOBENZAPRINE HYDROCHLORIDE 10 MILLIGRAM(S): 10 TABLET, FILM COATED ORAL at 21:25

## 2023-02-26 RX ADMIN — Medication 100 MILLIGRAM(S): at 21:24

## 2023-02-26 RX ADMIN — Medication 650 MILLIGRAM(S): at 07:18

## 2023-02-26 NOTE — DISCHARGE NOTE PROVIDER - NSDCFUADDINST_GEN_ALL_CORE_FT
Keep your wound clean and dry.   Once a day, ask a family member to check your incision for signs of infection such as: Redness, Swelling and tenderness,  Drainage  • You may use stairs as tolerated.  • You may shower briefly, unless you told not to by your doctor.   Cover your entire incision with a waterproof bandage to make sure it does not get wet. If it gets wet, dry it off.   No tub baths or swimming for two weeks.   Take pain medicine as prescribed.   You may find it helpful to take it for morning stiffness or for soreness when trying to sleep.  o Pain medication can cause constipation. Eating food with fiber such as fruits and  vegetables, and drinking liquids may help prevent constipation.  • Avoid bending, twisting or heavy lifting.  • Do not sit for more than 20 minutes each time you sit.  • Do not wear pants that are tight on your incision.  Call you doctor immediately if you have:  • Any new numbness, tingling, or weakness in your arms and legs.  Worsening pain not responsive to pain meds, Fever of 101° F or more.  You must call discharge to make a follow- up appointment with your doctor.

## 2023-02-26 NOTE — DISCHARGE NOTE PROVIDER - NSDCMRMEDTOKEN_GEN_ALL_CORE_FT
gabapentin 300 mg oral capsule: 1 cap(s) orally 3 times a day   Medrol Dosepak 4 mg oral tablet: 1 tab(s) orally once a day   naproxen 500 mg oral tablet: 1 tab(s) orally 2 times a day  oxyCODONE 5 mg oral tablet: 1 tab(s) orally every 6 hours, As Needed for severe pain MDD:4   albuterol 90 mcg/inh inhalation aerosol: 2 puff(s) inhaled every 6 hours, As needed, Shortness of Breath and/or Wheezing  bisacodyl 5 mg oral delayed release tablet: 1 tab(s) orally every 12 hours, As needed, Constipation  cyclobenzaprine 10 mg oral tablet: 1 tab(s) orally every 8 hours  magnesium hydroxide 8% oral suspension: 30 milliliter(s) orally every 12 hours, As needed, Constipation  montelukast 10 mg oral tablet: 1 tab(s) orally once a day  oxyCODONE 10 mg oral tablet: 1 tab(s) orally every 4 hours, As needed, Severe Pain (7 - 10)  oxyCODONE 5 mg oral tablet: 1 tab(s) orally every 6 hours, As needed, Moderate Pain (4 - 6)  pantoprazole 40 mg oral delayed release tablet: 1 tab(s) orally once a day (before a meal)  polyethylene glycol 3350 oral powder for reconstitution: 17 gram(s) orally once a day  senna leaf extract oral tablet: 2 tab(s) orally once a day (at bedtime)  traMADol 50 mg oral tablet: 1 tab(s) orally every 6 hours, As needed, Mild Pain (1 - 3)

## 2023-02-26 NOTE — DISCHARGE NOTE PROVIDER - NSDCCPCAREPLAN_GEN_ALL_CORE_FT
PRINCIPAL DISCHARGE DIAGNOSIS  Diagnosis: Lumbar stenosis  Assessment and Plan of Treatment:       SECONDARY DISCHARGE DIAGNOSES  Diagnosis: Unable to ambulate  Assessment and Plan of Treatment:     Diagnosis: Fall  Assessment and Plan of Treatment:      PRINCIPAL DISCHARGE DIAGNOSIS  Diagnosis: Lumbar stenosis  Assessment and Plan of Treatment: continue rehab, follow up w orthopedics      SECONDARY DISCHARGE DIAGNOSES  Diagnosis: Unable to ambulate  Assessment and Plan of Treatment:     Diagnosis: Fall  Assessment and Plan of Treatment:

## 2023-02-26 NOTE — PROGRESS NOTE ADULT - SUBJECTIVE AND OBJECTIVE BOX
Patient interviewed and examined at bedside, well-appearing and in no acute distress. Patient endorses continued cramping pain in the Right thigh and bilateral hands and fingers. Denies fevers, chills, headaches, chest pain, or shortness of breath.        Vital Signs Last 24 Hrs  T(C): 36.9 (26 Feb 2023 05:06), Max: 37.1 (25 Feb 2023 23:23)  T(F): 98.4 (26 Feb 2023 05:06), Max: 98.8 (25 Feb 2023 23:23)  HR: 56 (26 Feb 2023 05:06) (56 - 82)  BP: 166/92 (26 Feb 2023 05:06) (122/79 - 166/92)  BP(mean): --  RR: 18 (26 Feb 2023 05:06) (16 - 18)  SpO2: 98% (26 Feb 2023 05:06) (96% - 98%)    Parameters below as of 26 Feb 2023 05:06  Patient On (Oxygen Delivery Method): room air            PHYSICAL EXAMINATION:  General: NAD, A&Ox3  Spine:  No palpable step off. Nontender to palpation.     Motor:                   C5                C6              C7               C8           T1   R            5/5                5/5            5/5             5/5          5/5  L             5/5               5/5             5/5             5/5          5/5                L2             L3             L4               L5            S1  R         5/5           5/5          5/5             5/5           5/5  L          4/5          4/5           4/5             4/5           5/5    Sensory:            C5         C6         C7      C8       T1        (0=absent, 1=impaired, 2=normal, NT=not testable)  R         1            1           1        1         1  L          2            2           2        2         2               L2          L3         L4      L5       S1         (0=absent, 1=impaired, 2=normal, NT=not testable)  R         1            1            1        1        1  L          2            2           2        2         2    +SLR RLE (pain with passive motion only)  -SLR LLE    UMNs:    Babinski (-) B/L  Clonus (-) B/L  Perez (-) B/L                 ASSESSMENT:  67F with MR imaging demonstrating C4-6 disc bulges and L5-S1 cord compression     PLAN:   - Plan for OR today (Sunday, 2/26/23) for L5-S1 Decompression with Possible Fusion pending confirmation with attending surgeon.   - Medical optimization appreciated   - NPO    - IVF while NPO.   - Hold chemical DVT prophylaxis   - WBAT BLLE/PT/OT  - Dr Ernst agrees       Patient interviewed and examined at bedside, well-appearing and in no acute distress. Patient endorses continued cramping pain in the Right thigh and bilateral hands and fingers. Denies fevers, chills, headaches, chest pain, or shortness of breath.        Vital Signs Last 24 Hrs  T(C): 36.9 (26 Feb 2023 05:06), Max: 37.1 (25 Feb 2023 23:23)  T(F): 98.4 (26 Feb 2023 05:06), Max: 98.8 (25 Feb 2023 23:23)  HR: 56 (26 Feb 2023 05:06) (56 - 82)  BP: 166/92 (26 Feb 2023 05:06) (122/79 - 166/92)  BP(mean): --  RR: 18 (26 Feb 2023 05:06) (16 - 18)  SpO2: 98% (26 Feb 2023 05:06) (96% - 98%)    Parameters below as of 26 Feb 2023 05:06  Patient On (Oxygen Delivery Method): room air            PHYSICAL EXAMINATION:  General: NAD, A&Ox3  Spine:  No palpable step off. Nontender to palpation.     Motor:                   C5                C6              C7               C8           T1   R            5/5                5/5            5/5             5/5          5/5  L             5/5               5/5             5/5             5/5          5/5                L2             L3             L4               L5            S1  R         5/5           5/5          5/5             5/5           5/5  L          4/5          4/5           4/5             4/5           5/5    Sensory:            C5         C6         C7      C8       T1        (0=absent, 1=impaired, 2=normal, NT=not testable)  R         1            1           1        1         1  L          2            2           2        2         2               L2          L3         L4      L5       S1         (0=absent, 1=impaired, 2=normal, NT=not testable)  R         1            1            1        1        1  L          2            2           2        2         2    +SLR RLE (pain with passive motion only)  -SLR LLE    UMNs:    Babinski (-) B/L  Clonus (-) B/L  Perez (-) B/L                 ASSESSMENT:  67F with MR imaging demonstrating C4-6 disc disease with moderate stenosis and L4-S1 stenosis with severe stenosis at L5-S1 spondylolisthesis and L4-5 moderate stenosis and continued weakness in both legs right > left.     PLAN:   - Plan for OR today (Sunday, 2/26/23) for L5-S1 Decompression with Possible Fusion pending confirmation with attending surgeon.   - Medical optimization appreciated   - NPO    - IVF while NPO.   - Hold chemical DVT prophylaxis   - WBAT BLLE/PT/OT  - Dr Ernst agrees

## 2023-02-26 NOTE — DISCHARGE NOTE PROVIDER - NSDCCPTREATMENT_GEN_ALL_CORE_FT
PRINCIPAL PROCEDURE  Procedure: Fusion of posterior lumbar spine  Findings and Treatment: L4-S1

## 2023-02-26 NOTE — PROGRESS NOTE ADULT - SUBJECTIVE AND OBJECTIVE BOX
Patient is a 67y old  Female who presents with a chief complaint of fall, sciatica pain with RLE weakness/numbness (26 Feb 2023 07:21)    SUBJECTIVE / OVERNIGHT EVENTS:    ***incomplete -     Patient evaluated in PACU. s/p OR today. reports no n/v/d/cp/sob    MEDICATIONS  (STANDING):  influenza  Vaccine (HIGH DOSE) 0.7 milliLiter(s) IntraMuscular once    MEDICATIONS  (PRN):      PHYSICAL EXAM:  T(C): 36.9 (02-26-23 @ 05:06), Max: 37.1 (02-25-23 @ 23:23)  HR: 56 (02-26-23 @ 05:06) (56 - 82)  BP: 166/92 (02-26-23 @ 05:06) (122/79 - 166/92)  RR: 18 (02-26-23 @ 05:06) (18 - 18)  SpO2: 98% (02-26-23 @ 05:06) (97% - 98%)  I&O's Summary    25 Feb 2023 07:01  -  26 Feb 2023 07:00  --------------------------------------------------------  IN: 1770 mL / OUT: 2000 mL / NET: -230 mL      GENERAL: NAD, well-developed  HEAD:  Atraumatic, Normocephalic, MMM  CHEST/LUNG: No use of accessory muscles, CTAB, breathing non-labored  COR: RR, no mrcg  ABD: Soft, ND/NT, +BS  PSYCH: AAOx3  NEUROLOGY: CN II-XII grossly intact, moving all extremities  SKIN: No rashes or lesions  EXT: wwp, no cce    LABS:  CAPILLARY BLOOD GLUCOSE                              14.0   14.26 )-----------( 266      ( 26 Feb 2023 06:10 )             41.3     02-26    140  |  107  |  22  ----------------------------<  156<H>  4.6   |  27  |  0.69    Ca    8.9      26 Feb 2023 06:10      PT/INR - ( 26 Feb 2023 06:10 )   PT: 12.0 sec;   INR: 1.00 ratio         PTT - ( 26 Feb 2023 06:10 )  PTT:29.9 sec            RADIOLOGY & ADDITIONAL TESTS:    Telemetry Personally Reviewed -     Imaging Personally Reviewed -     Imaging Reviewed -     Consultant(s) Notes Reviewed -       Care Discussed with Consultants/Other Providers -  Patient is a 67y old  Female who presents with a chief complaint of fall, sciatica pain with RLE weakness/numbness (26 Feb 2023 07:21)    SUBJECTIVE / OVERNIGHT EVENTS:    Patient evaluated in PACU. s/p OR today.     Recovery nurse speaking to patient in [place] but pt does not respond to questions/commands. Likely sedated from anesthesia. Unable to obtain ROS 2/2 sedation.    MEDICATIONS  (STANDING):  influenza  Vaccine (HIGH DOSE) 0.7 milliLiter(s) IntraMuscular once    MEDICATIONS  (PRN):      PHYSICAL EXAM:  T(C): 36.9 (02-26-23 @ 05:06), Max: 37.1 (02-25-23 @ 23:23)  HR: 56 (02-26-23 @ 05:06) (56 - 82)  BP: 166/92 (02-26-23 @ 05:06) (122/79 - 166/92)  RR: 18 (02-26-23 @ 05:06) (18 - 18)  SpO2: 98% (02-26-23 @ 05:06) (97% - 98%)  I&O's Summary    25 Feb 2023 07:01  -  26 Feb 2023 07:00  --------------------------------------------------------  IN: 1770 mL / OUT: 2000 mL / NET: -230 mL      GENERAL: NAD, well-developed  HEAD:  Atraumatic, Normocephalic, MMM  CHEST/LUNG: No use of accessory muscles, CTAB, breathing non-labored  COR: RR, no mrcg  ABD: Soft, ND/NT, +BS  PSYCH: lethargic, does not respond to questions or commands  NEUROLOGY: unable to assess, does not respond to questions or commands  SKIN: No rashes or lesions  EXT: wwp, no cce    LABS:  CAPILLARY BLOOD GLUCOSE                              14.0   14.26 )-----------( 266      ( 26 Feb 2023 06:10 )             41.3     02-26    140  |  107  |  22  ----------------------------<  156<H>  4.6   |  27  |  0.69    Ca    8.9      26 Feb 2023 06:10      PT/INR - ( 26 Feb 2023 06:10 )   PT: 12.0 sec;   INR: 1.00 ratio         PTT - ( 26 Feb 2023 06:10 )  PTT:29.9 sec            RADIOLOGY & ADDITIONAL TESTS:    Telemetry Personally Reviewed -     Imaging Personally Reviewed -     Imaging Reviewed -     Consultant(s) Notes Reviewed -       Care Discussed with Consultants/Other Providers -

## 2023-02-26 NOTE — PROGRESS NOTE ADULT - ASSESSMENT
67 years old female with h/o sciatica pain present to ED with fall. Patient reported right sided sciatica pain, associated with numbness and weakness of right lower extremity. Patient first noted weakness/numbness around 6-7 months ago, which appear worsened over last one month. Patient ambulate independently and live alone. Yesterday, patient reported fall due to RLE weakness and unable to get up. No loss of bladder or bowel control.   Hemodynamically stable, afebrile, sat well at RA. No leukocytosis, K 3.9, Cr 0.71, CK 99. UA negative for UTI. CT head with no acute pathology. CT C spine with no fracture or malalignment. CT pelvis with no fracture or dislocation. CT T spine with no fracture. L spine noted multilevel degenerative changes. Moderate spinal canal stenosis at L5-S1, appears grossly unchanged from 12/7/2022      Problem/Plan - 1:  ·  Problem: Fall.   ·  Plan: present with fall  CT head with no acute pathology. CT C spine with no fracture or malalignment. CT pelvis with no fracture or dislocation. CT T spine with no fracture. L spine noted multilevel degenerative changes. Moderate spinal canal stenosis at L5-S1, appears grossly unchanged from 12/7/2022  (2/23) Ortho consulted: Family meeting as per ortho regarding surgical intervention  MRI reviewed: Cervical spine: Disc bulges at C4-5 and C5-6 narrow the cervical canal   and contact the spinal cord without cord abnormality.  Lumbar spine: Advanced degenerative changes at L5-S1 severely narrow the   thecal sac and compress the cauda equina nerves. Severe bilateral   foraminal stenosis is also demonstrated L5-S1  PT consult  (2/24) Patient amendable for surgical intervention if needed. Pt is fully orientated and able to make own decisions.   Medically optimized for surgical procedure.   Ortho to have discussion regarding surgical procedure inpatient vs. outpatient follow up.   Fall precaution  Continue gabapentin.  (2/25) Ortho planning for laminectomy as per team.   (2/26) s/p laminectomy by ortho    Problem/Plan - 2:  ·  Problem: Low back pain with right-sided sciatica.   ·  Plan: right sided sciatica pain with worsening RLE weakness/numbness.   CT T spine with no fracture. L spine noted multilevel degenerative changes. Moderate spinal canal stenosis at L5-S1, appears grossly unchanged from 12/7/2022  MRI L spine reviewed  Ortho involved. underwent laminectomy on Feb 26.    Problem/Plan - 3:  ·  Problem: Constipation  milk of mag       67 years old female with h/o sciatica pain present to ED with fall. Patient reported right sided sciatica pain, associated with numbness and weakness of right lower extremity. Patient first noted weakness/numbness around 6-7 months ago, which appear worsened over last one month. Patient ambulate independently and live alone. Yesterday, patient reported fall due to RLE weakness and unable to get up. No loss of bladder or bowel control.   Hemodynamically stable, afebrile, sat well at RA. No leukocytosis, K 3.9, Cr 0.71, CK 99. UA negative for UTI. CT head with no acute pathology. CT C spine with no fracture or malalignment. CT pelvis with no fracture or dislocation. CT T spine with no fracture. L spine noted multilevel degenerative changes. Moderate spinal canal stenosis at L5-S1, appears grossly unchanged from 12/7/2022      Problem/Plan - 1:  ·  Problem: Fall.   ·  Plan: present with fall  CT head with no acute pathology. CT C spine with no fracture or malalignment. CT pelvis with no fracture or dislocation. CT T spine with no fracture. L spine noted multilevel degenerative changes. Moderate spinal canal stenosis at L5-S1, appears grossly unchanged from 12/7/2022  (2/23) Ortho consulted: Family meeting as per ortho regarding surgical intervention  MRI reviewed: Cervical spine: Disc bulges at C4-5 and C5-6 narrow the cervical canal   and contact the spinal cord without cord abnormality.  Lumbar spine: Advanced degenerative changes at L5-S1 severely narrow the   thecal sac and compress the cauda equina nerves. Severe bilateral   foraminal stenosis is also demonstrated L5-S1  PT consult  (2/24) Patient amendable for surgical intervention if needed. Pt is fully orientated and able to make own decisions.   Medically optimized for surgical procedure.   Ortho to have discussion regarding surgical procedure inpatient vs. outpatient follow up.   Fall precaution  Continue gabapentin.  (2/25) Ortho planning for laminectomy as per team.   (2/26) s/p laminectomy by ortho, recovering in PACU; f/u op report, ortho recs    Problem/Plan - 2:  ·  Problem: Low back pain with right-sided sciatica.   ·  Plan: right sided sciatica pain with worsening RLE weakness/numbness.   CT T spine with no fracture. L spine noted multilevel degenerative changes. Moderate spinal canal stenosis at L5-S1, appears grossly unchanged from 12/7/2022  MRI L spine reviewed  Ortho involved. underwent laminectomy on Feb 26. f/u op report    Problem/Plan - 3:  ·  Problem: Constipation  milk of mag

## 2023-02-26 NOTE — DISCHARGE NOTE PROVIDER - HOSPITAL COURSE
67 years old female with h/o sciatica pain, intermittent asthma admitted to [facility] for severe symptomatic spinal stenosis w/ bilateral foraminal stenosis. Associated with lower extremity numbness/weakness. sp L4-S1 Decompression with Posterior Spinal Fusion  2/26.       # severe lumbar spinal stenosis s/p laminectomy by Ortho 2/26  # C-spine stenosis s/p  s/p C4-6 ACDF 3/3   # chronic low back pain  # right side sciatica   # mechanical fall  - trauma work up negative- CT head with no acute pathology. CT C spine with no fracture or malalignment. CT pelvis with no fracture or dislocation. CT T spine with no fracture. L spine noted multilevel degenerative changes. Moderate spinal canal stenosis at L5-S1, appears grossly unchanged from 12/7/2022  - MRI reviewed: Cervical spine: Disc bulges at C4-5 and C5-6 narrow the cervical canal and contact the spinal cord without cord abnormality.  - Lumbar spine: Advanced degenerative changes at L5-S1 severely narrow the thecal sac and compress the cauda equina nerves. Severe bilateral foraminal stenosis is also demonstrated L5-S1  --pain control prn, bowel regimen   --KAREN drain removed by ortho   --DVT ppx per ortho   -s/p decadron x 3 doses   --PT : [facility] (awaiting auth)     - Constipation  resolved     intermittent asthma , not in exacerbation   c/w albuterol HFA prn , singulair qhs     Preventative measures   SCDs-dvt ppx  fall precautions         pt seen and examined 45 min spent on dc planning     Lab test review, Radiology Review, Vitals review, Consultant review and discussion, Physical examination, IDR, Assessment and plan; Plan discussion with patient and family      67 years old female with h/o sciatica pain, intermittent asthma admitted to [facility] for severe symptomatic spinal stenosis w/ bilateral foraminal stenosis. Associated with lower extremity numbness/weakness. sp L4-S1 Decompression with Posterior Spinal Fusion  2/26.       # severe lumbar spinal stenosis s/p laminectomy by Ortho 2/26  # C-spine stenosis s/p  s/p C4-6 ACDF 3/3   # chronic low back pain  # right side sciatica   # mechanical fall  - trauma work up negative- CT head with no acute pathology. CT C spine with no fracture or malalignment. CT pelvis with no fracture or dislocation. CT T spine with no fracture. L spine noted multilevel degenerative changes. Moderate spinal canal stenosis at L5-S1, appears grossly unchanged from 12/7/2022  - MRI reviewed: Cervical spine: Disc bulges at C4-5 and C5-6 narrow the cervical canal and contact the spinal cord without cord abnormality.  - Lumbar spine: Advanced degenerative changes at L5-S1 severely narrow the thecal sac and compress the cauda equina nerves. Severe bilateral foraminal stenosis is also demonstrated L5-S1  --pain control prn, bowel regimen   --KAREN drain removed by ortho   --DVT ppx per ortho   -s/p decadron x 3 doses   --PT : [facility] (awaiting auth)     - Constipation  resolved     intermittent asthma , not in exacerbation   c/w albuterol HFA prn , singulair qhs     Preventative measures   SCDs-dvt ppx  fall precautions         pt seen and examined 45 min spent on dc planning     Lab test review, Radiology Review, Vitals review, Consultant review and discussion, Physical examination, IDR, Assessment and plan; Plan discussion with patient and family     .

## 2023-02-26 NOTE — PROGRESS NOTE ADULT - SUBJECTIVE AND OBJECTIVE BOX
Post-Operative Note - Orthopaedic Surgery:     Patient interviewed and examined at bedside, well-appearing and in no acute distress. Pain well-controlled. Endorses cramping pain and weakness in the Right lower extremity, unaccompanied by numbness or tingling. Otherwise denies pain, weakness, numbness, or tingling in the bilateral upper extremities and denies pain, weakness, numbness or tingling in the Left extremity. Denies fevers, chills, headaches, chest pain, shortness of breath, urinary or fecal incontinence, or saddle anesthesia. Patient tolerated procedure well.     VITAL SIGNS  T(C): 36.8 (02-26-23 @ 17:00), Max: 37.1 (02-25-23 @ 23:23)  HR: 75 (02-26-23 @ 17:00) (53 - 75)  BP: 164/98 (02-26-23 @ 17:00) (128/77 - 170/95)  RR: 18 (02-26-23 @ 17:00) (10 - 18)  SpO2: 98% (02-26-23 @ 17:00) (97% - 100%)      LABS:                        13.0   16.54 )-----------( 242      ( 26 Feb 2023 13:50 )             39.4     02-26    141  |  108  |  17  ----------------------------<  173<H>  4.8   |  28  |  0.76    Ca    8.6      26 Feb 2023 13:50      PT/INR - ( 26 Feb 2023 06:10 )   PT: 12.0 sec;   INR: 1.00 ratio      PTT - ( 26 Feb 2023 06:10 )  PTT:29.9 sec      PHYSICAL EXAM  GEN: NAD, AAOx3    SPINE:  Aquacel bandage clean, dry, and intact with KAREN x1 draining SS fluid.   Grossly moving all extremities.   SILT throughout all extremities.   + Radial pulses bilaterally.   + DP/PT pulses bilaterally.   No saddle anesthesia.       MOTOR EXAM:                          Elbow Flex (C5)     Wrist Ext (C6)     Elbow Ext (C7)      Finger Flex (C8)    Finger Abduction (T1)  RIGHT                 4/5                         5/5                         5/5                          5/5                              5/5  LEFT                    5/5                         5/5                         5/5                          5/5                              5/5                           Hip Flex (L2)      Knee Ext (L3)      Ank Dorsiflex (L4)     Hallux Ext (L5)     Ank PlantarFlex (S1)  RIGHT               5/5                      5/5                          5/5                            5/5                           5/5  LEFT                  3/5                      4/5                          5/5                            5/5                           5/5      *** Weakness on exam should be considered in the context of post-operative pain.       SENSORY EXAM:                        C5      C6      C7      C8       T1          RIGHT          2         2        2         2         2          (0=absent, 1=impaired, 2=normal, NT=not testable)  LEFT             2         2        2         2         2          (0=absent, 1=impaired, 2=normal, NT=not testable)                        L2        L3       L4      L5       S1          RIGHT        1          1         1        1        1           (0=absent, 1=impaired, 2=normal, NT=not testable)  LEFT           2          2         2        2        2           (0=absent, 1=impaired, 2=normal, NT=not testable)    Negative Perez's sign bilaterally.   Negative Babinski bilaterally.   Negative Myoclonus bilaterally.         ASSESSMENT:  67F status-post L4-S1 Decompression with Posterior Spinal Fusion POD #0; Stable.     PLAN:  - Pain control.   - DVT Ppx: Hold in the setting of recent spinal surgery due to increased risk of epidural hematoma.   - Drain in place draining sero-sanguinous fluid; Monitor output.   - WBAT / OOB with assistance as needed.   - Will order LSO for patient to be used while ambulatory.   - PT/OT.   - Encourage incentive spirometry.   - DC planning: Home vs [facility] per PT recommendations.   - Will discuss with Dr. Ernst and will advise if plan changes.

## 2023-02-26 NOTE — DISCHARGE NOTE PROVIDER - CARE PROVIDER_API CALL
Tony Ernst (DO)  Orthopaedic Surgery Surgery  [address] [facility], [address] [place] [address]  Phone: [phone]  Fax: [phone]  Follow Up Time:

## 2023-02-27 LAB
ANION GAP SERPL CALC-SCNC: 7 MMOL/L — SIGNIFICANT CHANGE UP (ref 5–17)
BASOPHILS # BLD AUTO: 0.02 K/UL — SIGNIFICANT CHANGE UP (ref 0–0.2)
BASOPHILS NFR BLD AUTO: 0.1 % — SIGNIFICANT CHANGE UP (ref 0–2)
BUN SERPL-MCNC: 17 MG/DL — SIGNIFICANT CHANGE UP (ref 7–23)
CALCIUM SERPL-MCNC: 8.5 MG/DL — SIGNIFICANT CHANGE UP (ref 8.5–10.1)
CHLORIDE SERPL-SCNC: 102 MMOL/L — SIGNIFICANT CHANGE UP (ref 96–108)
CO2 SERPL-SCNC: 28 MMOL/L — SIGNIFICANT CHANGE UP (ref 22–31)
CREAT SERPL-MCNC: 0.61 MG/DL — SIGNIFICANT CHANGE UP (ref 0.5–1.3)
EGFR: 98 ML/MIN/1.73M2 — SIGNIFICANT CHANGE UP
EOSINOPHIL # BLD AUTO: 0.01 K/UL — SIGNIFICANT CHANGE UP (ref 0–0.5)
EOSINOPHIL NFR BLD AUTO: 0.1 % — SIGNIFICANT CHANGE UP (ref 0–6)
GLUCOSE SERPL-MCNC: 102 MG/DL — HIGH (ref 70–99)
HCT VFR BLD CALC: 37.8 % — SIGNIFICANT CHANGE UP (ref 34.5–45)
HGB BLD-MCNC: 12.7 G/DL — SIGNIFICANT CHANGE UP (ref 11.5–15.5)
IMM GRANULOCYTES NFR BLD AUTO: 0.9 % — SIGNIFICANT CHANGE UP (ref 0–0.9)
LYMPHOCYTES # BLD AUTO: 14.9 % — SIGNIFICANT CHANGE UP (ref 13–44)
LYMPHOCYTES # BLD AUTO: 2.66 K/UL — SIGNIFICANT CHANGE UP (ref 1–3.3)
MAGNESIUM SERPL-MCNC: 2.5 MG/DL — SIGNIFICANT CHANGE UP (ref 1.6–2.6)
MCHC RBC-ENTMCNC: 31.4 PG — SIGNIFICANT CHANGE UP (ref 27–34)
MCHC RBC-ENTMCNC: 33.6 G/DL — SIGNIFICANT CHANGE UP (ref 32–36)
MCV RBC AUTO: 93.3 FL — SIGNIFICANT CHANGE UP (ref 80–100)
MONOCYTES # BLD AUTO: 2.66 K/UL — HIGH (ref 0–0.9)
MONOCYTES NFR BLD AUTO: 14.9 % — HIGH (ref 2–14)
NEUTROPHILS # BLD AUTO: 12.39 K/UL — HIGH (ref 1.8–7.4)
NEUTROPHILS NFR BLD AUTO: 69.1 % — SIGNIFICANT CHANGE UP (ref 43–77)
NRBC # BLD: 0 /100 WBCS — SIGNIFICANT CHANGE UP (ref 0–0)
PHOSPHATE SERPL-MCNC: 4.6 MG/DL — HIGH (ref 2.5–4.5)
PLATELET # BLD AUTO: 241 K/UL — SIGNIFICANT CHANGE UP (ref 150–400)
POTASSIUM SERPL-MCNC: 4.2 MMOL/L — SIGNIFICANT CHANGE UP (ref 3.5–5.3)
POTASSIUM SERPL-SCNC: 4.2 MMOL/L — SIGNIFICANT CHANGE UP (ref 3.5–5.3)
RBC # BLD: 4.05 M/UL — SIGNIFICANT CHANGE UP (ref 3.8–5.2)
RBC # FLD: 12.9 % — SIGNIFICANT CHANGE UP (ref 10.3–14.5)
SODIUM SERPL-SCNC: 137 MMOL/L — SIGNIFICANT CHANGE UP (ref 135–145)
WBC # BLD: 17.9 K/UL — HIGH (ref 3.8–10.5)
WBC # FLD AUTO: 17.9 K/UL — HIGH (ref 3.8–10.5)

## 2023-02-27 PROCEDURE — 99232 SBSQ HOSP IP/OBS MODERATE 35: CPT

## 2023-02-27 RX ADMIN — Medication 500 MILLIGRAM(S): at 05:48

## 2023-02-27 RX ADMIN — CYCLOBENZAPRINE HYDROCHLORIDE 10 MILLIGRAM(S): 10 TABLET, FILM COATED ORAL at 05:48

## 2023-02-27 RX ADMIN — Medication 650 MILLIGRAM(S): at 23:46

## 2023-02-27 RX ADMIN — Medication 650 MILLIGRAM(S): at 18:01

## 2023-02-27 RX ADMIN — CYCLOBENZAPRINE HYDROCHLORIDE 10 MILLIGRAM(S): 10 TABLET, FILM COATED ORAL at 14:35

## 2023-02-27 RX ADMIN — Medication 500 MILLIGRAM(S): at 17:34

## 2023-02-27 RX ADMIN — Medication 1 MILLIGRAM(S): at 11:36

## 2023-02-27 RX ADMIN — Medication 650 MILLIGRAM(S): at 01:03

## 2023-02-27 RX ADMIN — SENNA PLUS 2 TABLET(S): 8.6 TABLET ORAL at 21:41

## 2023-02-27 RX ADMIN — OXYCODONE HYDROCHLORIDE 5 MILLIGRAM(S): 5 TABLET ORAL at 01:03

## 2023-02-27 RX ADMIN — Medication 650 MILLIGRAM(S): at 06:48

## 2023-02-27 RX ADMIN — OXYCODONE HYDROCHLORIDE 5 MILLIGRAM(S): 5 TABLET ORAL at 00:03

## 2023-02-27 RX ADMIN — Medication 650 MILLIGRAM(S): at 00:03

## 2023-02-27 RX ADMIN — Medication 650 MILLIGRAM(S): at 11:36

## 2023-02-27 RX ADMIN — Medication 650 MILLIGRAM(S): at 17:33

## 2023-02-27 RX ADMIN — CYCLOBENZAPRINE HYDROCHLORIDE 10 MILLIGRAM(S): 10 TABLET, FILM COATED ORAL at 21:41

## 2023-02-27 RX ADMIN — Medication 100 MILLIGRAM(S): at 05:48

## 2023-02-27 RX ADMIN — Medication 650 MILLIGRAM(S): at 12:00

## 2023-02-27 RX ADMIN — Medication 650 MILLIGRAM(S): at 05:48

## 2023-02-27 RX ADMIN — Medication 1 TABLET(S): at 11:36

## 2023-02-27 NOTE — PROGRESS NOTE ADULT - ASSESSMENT
67 years old female with h/o sciatica pain admitted to [facility] for severe symptomatic spinal stenosis w/ bilateral foraminal stenosis. Associated with lower extremity numbness/weakness. sp Laminectomy on 2/26    GENERAL MEDICINE  # mechanical fall  - trauma work up negative- CT head with no acute pathology. CT C spine with no fracture or malalignment. CT pelvis with no fracture or dislocation. CT T spine with no fracture. L spine noted multilevel degenerative changes. Moderate spinal canal stenosis at L5-S1, appears grossly unchanged from 12/7/2022  (2/23) Ortho consulted: Family meeting as per ortho regarding surgical intervention    ORTHOPEDIC  # severe lumbar spinal stenosis  # chronic low back pain  # right side sciatica   - MRI reviewed: Cervical spine: Disc bulges at C4-5 and C5-6 narrow the cervical canal and contact the spinal cord without cord abnormality.  - Lumbar spine: Advanced degenerative changes at L5-S1 severely narrow the thecal sac and compress the cauda equina nerves. Severe bilateral foraminal stenosis is also demonstrated L5-S1  PT consult  - (2/24) Patient amendable for surgical intervention if needed. Pt is fully orientated and able to make own decisions.   - (2/25) Ortho planning for laminectomy as per team.   - (2/26) s/p laminectomy by ortho, recovering in PACU; f/u op report, ortho recs    GASTROENTEROLOGY  - Constipation  milk of mag    PLAN  - c/w [facility]  - follow up with ORTHOPEDICS on when pt is cleared for DC   - pain management  - bowel regimen  67 years old female with h/o sciatica pain admitted to [facility] for severe symptomatic spinal stenosis w/ bilateral foraminal stenosis. Associated with lower extremity numbness/weakness. sp L4-S1 Decompression with Posterior Spinal Fusion. POD # 1 on 2/26.     GENERAL MEDICINE  # mechanical fall  - trauma work up negative- CT head with no acute pathology. CT C spine with no fracture or malalignment. CT pelvis with no fracture or dislocation. CT T spine with no fracture. L spine noted multilevel degenerative changes. Moderate spinal canal stenosis at L5-S1, appears grossly unchanged from 12/7/2022  (2/23) Ortho consulted: Family meeting as per ortho regarding surgical intervention    ORTHOPEDIC  # severe lumbar spinal stenosis  # chronic low back pain  # right side sciatica   - MRI reviewed: Cervical spine: Disc bulges at C4-5 and C5-6 narrow the cervical canal and contact the spinal cord without cord abnormality.  - Lumbar spine: Advanced degenerative changes at L5-S1 severely narrow the thecal sac and compress the cauda equina nerves. Severe bilateral foraminal stenosis is also demonstrated L5-S1  PT consult  - (2/24) Patient amendable for surgical intervention if needed. Pt is fully orientated and able to make own decisions.   - (2/25) Ortho planning for laminectomy as per team.   - (2/26) s/p laminectomy by ortho, recovering in PACU; f/u op report, ortho recs    GASTROENTEROLOGY  - Constipation  milk of mag    PLAN  - c/w [facility]  - follow up with ORTHOPEDICS on when pt is cleared for DC   - pain management  - bowel regimen

## 2023-02-27 NOTE — PROGRESS NOTE ADULT - SUBJECTIVE AND OBJECTIVE BOX
Patient is a 67y old  Female who presents with a chief complaint of fall, sciatica pain with RLE weakness/numbness (27 Feb 2023 06:00)    INTERVAL HPI/OVERNIGHT EVENTS: overnight no acute events  SUBJECTIVE: no fever/chills/nausea/vomiting. reports back pain    MEDICATIONS  (STANDING):  acetaminophen     Tablet .. 650 milliGRAM(s) Oral every 6 hours  ascorbic acid 500 milliGRAM(s) Oral two times a day  cyclobenzaprine 10 milliGRAM(s) Oral every 8 hours  folic acid 1 milliGRAM(s) Oral daily  HYDROmorphone  Injectable 0.5 milliGRAM(s) IV Push once  influenza  Vaccine (HIGH DOSE) 0.7 milliLiter(s) IntraMuscular once  lactated ringers. 1000 milliLiter(s) (100 mL/Hr) IV Continuous <Continuous>  multivitamin 1 Tablet(s) Oral daily  pantoprazole    Tablet 40 milliGRAM(s) Oral before breakfast  polyethylene glycol 3350 17 Gram(s) Oral daily  senna 2 Tablet(s) Oral at bedtime    MEDICATIONS  (PRN):  bisacodyl 5 milliGRAM(s) Oral every 12 hours PRN Constipation  magnesium hydroxide Suspension 30 milliLiter(s) Oral every 12 hours PRN Constipation  ondansetron Injectable 4 milliGRAM(s) IV Push every 6 hours PRN Nausea and/or Vomiting  oxyCODONE    IR 2.5 milliGRAM(s) Oral every 4 hours PRN Moderate Pain (4 - 6)  oxyCODONE    IR 5 milliGRAM(s) Oral every 4 hours PRN Severe Pain (7 - 10)  traMADol 50 milliGRAM(s) Oral every 6 hours PRN Mild Pain (1 - 3)    Allergies    No Known Allergies    Intolerances      REVIEW OF SYSTEMS:  All other systems reviewed and are negative    Vital Signs Last 24 Hrs  T(C): 36.6 (27 Feb 2023 11:32), Max: 36.8 (26 Feb 2023 17:00)  T(F): 97.9 (27 Feb 2023 11:32), Max: 98.2 (26 Feb 2023 17:00)  HR: 90 (27 Feb 2023 11:32) (53 - 90)  BP: 107/69 (27 Feb 2023 11:32) (107/69 - 170/95)  BP(mean): --  RR: 17 (27 Feb 2023 11:32) (10 - 18)  SpO2: 97% (27 Feb 2023 11:32) (95% - 100%)    Parameters below as of 27 Feb 2023 11:32  Patient On (Oxygen Delivery Method): room air      Daily     Daily   I&O's Summary    26 Feb 2023 07:01  -  27 Feb 2023 07:00  --------------------------------------------------------  IN: 1300 mL / OUT: 2375 mL / NET: -1075 mL    27 Feb 2023 07:01  -  27 Feb 2023 13:12  --------------------------------------------------------  IN: 480 mL / OUT: 0 mL / NET: 480 mL      CAPILLARY BLOOD GLUCOSE        PHYSICAL EXAM:  GENERAL: NAD, well-groomed, well-developed  HEAD:  Atraumatic, Normocephalic  EYES: EOMI, PERRLA, conjunctiva and sclera clear  ENMT: No tonsillar erythema, exudates, or enlargement; Moist mucous membranes, Good dentition, No lesions  NECK: Supple, No JVD, Normal thyroid  NERVOUS SYSTEM:  Alert & Oriented X3, Good concentration  CHEST/LUNG: Clear to percussion bilaterally; No rales, rhonchi, wheezing, or rubs  HEART: Regular rate and rhythm; No murmurs, rubs, or gallops  ABDOMEN: Soft, Nontender, Nondistended; Bowel sounds present  EXTREMITIES:  2+ Peripheral Pulses, No clubbing, cyanosis, or edema  LYMPH: No lymphadenopathy noted  BACK: bandages on the lower extremity c/d/i - no pus  Lines: KAREN drain, centeno     Labs                          12.7   17.90 )-----------( 241      ( 27 Feb 2023 05:30 )             37.8     02-27    137  |  102  |  17  ----------------------------<  102<H>  4.2   |  28  |  0.61    Ca    8.5      27 Feb 2023 05:30  Phos  4.6     02-27  Mg     2.5     02-27      PT/INR - ( 26 Feb 2023 06:10 )   PT: 12.0 sec;   INR: 1.00 ratio         PTT - ( 26 Feb 2023 06:10 )  PTT:29.9 sec

## 2023-02-27 NOTE — PHYSICAL THERAPY INITIAL EVALUATION ADULT - PASSIVE RANGE OF MOTION EXAMINATION, REHAB EVAL
bilateral upper extremity Passive ROM was WNL (within normal limits)/bilateral lower extremity Passive ROM was WNL
bilateral lower extremity Passive ROM was WFL (within functional limits)

## 2023-02-27 NOTE — PROGRESS NOTE ADULT - SUBJECTIVE AND OBJECTIVE BOX
Progress Note: Orthopaedic Surgery - Spine Service       Patient interviewed and examined at bedside, well-appearing and in no acute distress. Endorses increased back pain relative to yesterday's examination. Denies fevers, chills, headaches, shortness of breath.       VITAL SIGNS  T(C): 36.4 (27 Feb 2023 06:24), Max: 36.8 (26 Feb 2023 17:00)  T(F): 97.5 (27 Feb 2023 06:24), Max: 98.2 (26 Feb 2023 17:00)  HR: 61 (27 Feb 2023 06:24) (53 - 75)  BP: 152/90 (27 Feb 2023 06:24) (112/72 - 170/95)  RR: 18 (27 Feb 2023 06:24) (10 - 18)  SpO2: 96% (27 Feb 2023 06:24) (95% - 100%)      LABS:                        12.7   17.90 )-----------( 241      ( 27 Feb 2023 05:30 )             37.8       137  |  102  |  17  ----------------------------<  102<H>  4.2   |  28  |  0.61    Ca    8.5      27 Feb 2023 05:30  Phos  4.6     02-27  Mg     2.5     02-27    PT/INR - ( 26 Feb 2023 06:10 )   PT: 12.0 sec;   INR: 1.00 ratio      PTT - ( 26 Feb 2023 06:10 )  PTT:29.9 sec      PHYSICAL EXAM  GEN: NAD, AAOx3    SPINE:  Aquacel bandage clean, dry, and intact with KAREN x1 draining SS fluid.   Grossly moving all extremities.   SILT throughout all extremities.   + Radial pulses bilaterally.   + DP/PT pulses bilaterally.   No saddle anesthesia.       MOTOR EXAM:                          Elbow Flex (C5)     Wrist Ext (C6)     Elbow Ext (C7)      Finger Flex (C8)    Finger Abduction (T1)  RIGHT                 4/5                         5/5                         5/5                          5/5                              5/5  LEFT                    5/5                         5/5                         5/5                          5/5                              5/5                           Hip Flex (L2)      Knee Ext (L3)      Ank Dorsiflex (L4)     Hallux Ext (L5)     Ank PlantarFlex (S1)  RIGHT               4/5                      4/5                          5/5                            5/5                           5/5  LEFT                  4/5                      4/5                          5/5                            5/5                           5/5      *** Weakness on exam should be considered in the context of post-operative pain.       SENSORY EXAM:                        C5      C6      C7      C8       T1          RIGHT          2         2        2         2         2          (0=absent, 1=impaired, 2=normal, NT=not testable)  LEFT             2         2        2         2         2          (0=absent, 1=impaired, 2=normal, NT=not testable)                        L2        L3       L4      L5       S1          RIGHT        1          1         1        1        1           (0=absent, 1=impaired, 2=normal, NT=not testable)  LEFT           2          2         2        2        2           (0=absent, 1=impaired, 2=normal, NT=not testable)    Negative Perez's sign bilaterally.   Negative Babinski bilaterally.   Negative Myoclonus bilaterally.         ASSESSMENT:  67F status-post L4-S1 Decompression with Posterior Spinal Fusion POD #1; Stable.     PLAN:  - Pain control.   - DVT Ppx: Hold in the setting of recent spinal surgery due to increased risk of epidural hematoma.   - Drain in place draining sero-sanguinous fluid; Monitor output.   - WBAT / OOB with assistance as needed.   - Will order LSO for patient to be used while ambulatory.   - PT/OT.   - Encourage incentive spirometry.   - DC planning: Home vs [facility] per PT recommendations.   - Will discuss with Dr. Ernst and will advise if plan changes.

## 2023-02-28 LAB
ALBUMIN SERPL ELPH-MCNC: 2.1 G/DL — LOW (ref 3.3–5)
ALP SERPL-CCNC: 54 U/L — SIGNIFICANT CHANGE UP (ref 40–120)
ALT FLD-CCNC: 79 U/L — HIGH (ref 12–78)
ANION GAP SERPL CALC-SCNC: 8 MMOL/L — SIGNIFICANT CHANGE UP (ref 5–17)
AST SERPL-CCNC: 32 U/L — SIGNIFICANT CHANGE UP (ref 15–37)
BASOPHILS # BLD AUTO: 0.03 K/UL — SIGNIFICANT CHANGE UP (ref 0–0.2)
BASOPHILS NFR BLD AUTO: 0.2 % — SIGNIFICANT CHANGE UP (ref 0–2)
BILIRUB SERPL-MCNC: 0.3 MG/DL — SIGNIFICANT CHANGE UP (ref 0.2–1.2)
BUN SERPL-MCNC: 16 MG/DL — SIGNIFICANT CHANGE UP (ref 7–23)
CALCIUM SERPL-MCNC: 7.9 MG/DL — LOW (ref 8.5–10.1)
CHLORIDE SERPL-SCNC: 101 MMOL/L — SIGNIFICANT CHANGE UP (ref 96–108)
CO2 SERPL-SCNC: 30 MMOL/L — SIGNIFICANT CHANGE UP (ref 22–31)
CREAT SERPL-MCNC: 0.61 MG/DL — SIGNIFICANT CHANGE UP (ref 0.5–1.3)
EGFR: 98 ML/MIN/1.73M2 — SIGNIFICANT CHANGE UP
EOSINOPHIL # BLD AUTO: 0.1 K/UL — SIGNIFICANT CHANGE UP (ref 0–0.5)
EOSINOPHIL NFR BLD AUTO: 0.7 % — SIGNIFICANT CHANGE UP (ref 0–6)
GLUCOSE SERPL-MCNC: 86 MG/DL — SIGNIFICANT CHANGE UP (ref 70–99)
HCT VFR BLD CALC: 38.4 % — SIGNIFICANT CHANGE UP (ref 34.5–45)
HGB BLD-MCNC: 12.9 G/DL — SIGNIFICANT CHANGE UP (ref 11.5–15.5)
IMM GRANULOCYTES NFR BLD AUTO: 1.5 % — HIGH (ref 0–0.9)
LYMPHOCYTES # BLD AUTO: 22.3 % — SIGNIFICANT CHANGE UP (ref 13–44)
LYMPHOCYTES # BLD AUTO: 3.35 K/UL — HIGH (ref 1–3.3)
MAGNESIUM SERPL-MCNC: 2.6 MG/DL — SIGNIFICANT CHANGE UP (ref 1.6–2.6)
MCHC RBC-ENTMCNC: 30.5 PG — SIGNIFICANT CHANGE UP (ref 27–34)
MCHC RBC-ENTMCNC: 33.6 G/DL — SIGNIFICANT CHANGE UP (ref 32–36)
MCV RBC AUTO: 90.8 FL — SIGNIFICANT CHANGE UP (ref 80–100)
MONOCYTES # BLD AUTO: 2.21 K/UL — HIGH (ref 0–0.9)
MONOCYTES NFR BLD AUTO: 14.7 % — HIGH (ref 2–14)
NEUTROPHILS # BLD AUTO: 9.08 K/UL — HIGH (ref 1.8–7.4)
NEUTROPHILS NFR BLD AUTO: 60.6 % — SIGNIFICANT CHANGE UP (ref 43–77)
NRBC # BLD: 0 /100 WBCS — SIGNIFICANT CHANGE UP (ref 0–0)
PHOSPHATE SERPL-MCNC: 3.3 MG/DL — SIGNIFICANT CHANGE UP (ref 2.5–4.5)
PLATELET # BLD AUTO: 237 K/UL — SIGNIFICANT CHANGE UP (ref 150–400)
POTASSIUM SERPL-MCNC: 4 MMOL/L — SIGNIFICANT CHANGE UP (ref 3.5–5.3)
POTASSIUM SERPL-SCNC: 4 MMOL/L — SIGNIFICANT CHANGE UP (ref 3.5–5.3)
PROT SERPL-MCNC: 5.7 GM/DL — LOW (ref 6–8.3)
RBC # BLD: 4.23 M/UL — SIGNIFICANT CHANGE UP (ref 3.8–5.2)
RBC # FLD: 12.7 % — SIGNIFICANT CHANGE UP (ref 10.3–14.5)
SODIUM SERPL-SCNC: 139 MMOL/L — SIGNIFICANT CHANGE UP (ref 135–145)
WBC # BLD: 14.99 K/UL — HIGH (ref 3.8–10.5)
WBC # FLD AUTO: 14.99 K/UL — HIGH (ref 3.8–10.5)

## 2023-02-28 PROCEDURE — 99232 SBSQ HOSP IP/OBS MODERATE 35: CPT

## 2023-02-28 RX ADMIN — CYCLOBENZAPRINE HYDROCHLORIDE 10 MILLIGRAM(S): 10 TABLET, FILM COATED ORAL at 21:13

## 2023-02-28 RX ADMIN — Medication 650 MILLIGRAM(S): at 12:00

## 2023-02-28 RX ADMIN — Medication 1 TABLET(S): at 11:07

## 2023-02-28 RX ADMIN — CYCLOBENZAPRINE HYDROCHLORIDE 10 MILLIGRAM(S): 10 TABLET, FILM COATED ORAL at 13:56

## 2023-02-28 RX ADMIN — Medication 650 MILLIGRAM(S): at 00:45

## 2023-02-28 RX ADMIN — Medication 5 MILLIGRAM(S): at 21:17

## 2023-02-28 RX ADMIN — POLYETHYLENE GLYCOL 3350 17 GRAM(S): 17 POWDER, FOR SOLUTION ORAL at 11:07

## 2023-02-28 RX ADMIN — Medication 1 MILLIGRAM(S): at 11:07

## 2023-02-28 RX ADMIN — Medication 650 MILLIGRAM(S): at 17:22

## 2023-02-28 RX ADMIN — Medication 650 MILLIGRAM(S): at 11:07

## 2023-02-28 RX ADMIN — Medication 650 MILLIGRAM(S): at 06:09

## 2023-02-28 RX ADMIN — CYCLOBENZAPRINE HYDROCHLORIDE 10 MILLIGRAM(S): 10 TABLET, FILM COATED ORAL at 06:09

## 2023-02-28 RX ADMIN — SENNA PLUS 2 TABLET(S): 8.6 TABLET ORAL at 21:14

## 2023-02-28 RX ADMIN — Medication 650 MILLIGRAM(S): at 18:18

## 2023-02-28 RX ADMIN — Medication 500 MILLIGRAM(S): at 06:11

## 2023-02-28 RX ADMIN — Medication 650 MILLIGRAM(S): at 07:09

## 2023-02-28 RX ADMIN — Medication 500 MILLIGRAM(S): at 17:22

## 2023-02-28 RX ADMIN — PANTOPRAZOLE SODIUM 40 MILLIGRAM(S): 20 TABLET, DELAYED RELEASE ORAL at 07:51

## 2023-02-28 RX ADMIN — Medication 650 MILLIGRAM(S): at 23:19

## 2023-02-28 NOTE — PROGRESS NOTE ADULT - SUBJECTIVE AND OBJECTIVE BOX
Patient is a 67y old  Female who presents with a chief complaint of fall, sciatica pain with RLE weakness/numbness (28 Feb 2023 06:00)    INTERVAL HPI/OVERNIGHT EVENTS: no acute events  SUBJECTIVE: no fever/chills. reports feeling better than yesterday. still having problems with bowel movement     MEDICATIONS  (STANDING):  acetaminophen     Tablet .. 650 milliGRAM(s) Oral every 6 hours  ascorbic acid 500 milliGRAM(s) Oral two times a day  cyclobenzaprine 10 milliGRAM(s) Oral every 8 hours  folic acid 1 milliGRAM(s) Oral daily  HYDROmorphone  Injectable 0.5 milliGRAM(s) IV Push once  influenza  Vaccine (HIGH DOSE) 0.7 milliLiter(s) IntraMuscular once  lactated ringers. 1000 milliLiter(s) (100 mL/Hr) IV Continuous <Continuous>  multivitamin 1 Tablet(s) Oral daily  pantoprazole    Tablet 40 milliGRAM(s) Oral before breakfast  polyethylene glycol 3350 17 Gram(s) Oral daily  senna 2 Tablet(s) Oral at bedtime    MEDICATIONS  (PRN):  bisacodyl 5 milliGRAM(s) Oral every 12 hours PRN Constipation  magnesium hydroxide Suspension 30 milliLiter(s) Oral every 12 hours PRN Constipation  ondansetron Injectable 4 milliGRAM(s) IV Push every 6 hours PRN Nausea and/or Vomiting  oxyCODONE    IR 2.5 milliGRAM(s) Oral every 4 hours PRN Moderate Pain (4 - 6)  oxyCODONE    IR 5 milliGRAM(s) Oral every 4 hours PRN Severe Pain (7 - 10)  traMADol 50 milliGRAM(s) Oral every 6 hours PRN Mild Pain (1 - 3)    Allergies    No Known Allergies    Intolerances      REVIEW OF SYSTEMS:  All other systems reviewed and are negative    Vital Signs Last 24 Hrs  T(C): 37.3 (28 Feb 2023 11:29), Max: 37.8 (27 Feb 2023 16:35)  T(F): 99.1 (28 Feb 2023 11:29), Max: 100.1 (27 Feb 2023 16:35)  HR: 71 (28 Feb 2023 11:29) (71 - 108)  BP: 107/73 (28 Feb 2023 11:29) (100/69 - 115/73)  BP(mean): --  RR: 16 (28 Feb 2023 11:29) (16 - 18)  SpO2: 95% (28 Feb 2023 11:29) (95% - 99%)    Parameters below as of 28 Feb 2023 11:29  Patient On (Oxygen Delivery Method): room air      Daily     Daily   I&O's Summary    27 Feb 2023 07:01  -  28 Feb 2023 07:00  --------------------------------------------------------  IN: 480 mL / OUT: 3795 mL / NET: -3315 mL    28 Feb 2023 07:01  -  28 Feb 2023 12:22  --------------------------------------------------------  IN: 0 mL / OUT: 75 mL / NET: -75 mL      CAPILLARY BLOOD GLUCOSE        PHYSICAL EXAM:  GENERAL: NAD, well-groomed, well-developed  HEAD:  Atraumatic, Normocephalic  EYES: EOMI, PERRLA, conjunctiva and sclera clear  ENMT: No tonsillar erythema, exudates, or enlargement; Moist mucous membranes, Good dentition, No lesions  NECK: Supple, No JVD, Normal thyroid  NERVOUS SYSTEM:  Alert & Oriented X3, Good concentration; Motor Strength 5/5 B/L upper and lower extremities; DTRs 2+ intact and symmetric  CHEST/LUNG: Clear to percussion bilaterally; No rales, rhonchi, wheezing, or rubs  HEART: Regular rate and rhythm; No murmurs, rubs, or gallops  ABDOMEN: Soft, Nontender, Nondistended; Bowel sounds present  EXTREMITIES:  2+ Peripheral Pulses, No clubbing, cyanosis, or edema  LYMPH: No lymphadenopathy noted  SKIN: No rashes or lesions  Lines: Dick draing- serosanginous fluid in balloon    Labs                          12.9   14.99 )-----------( 237      ( 28 Feb 2023 05:25 )             38.4     02-28    139  |  101  |  16  ----------------------------<  86  4.0   |  30  |  0.61    Ca    7.9<L>      28 Feb 2023 05:25  Phos  3.3     02-28  Mg     2.6     02-28    TPro  5.7<L>  /  Alb  2.1<L>  /  TBili  0.3  /  DBili  x   /  AST  32  /  ALT  79<H>  /  AlkPhos  54  02-28

## 2023-02-28 NOTE — PROGRESS NOTE ADULT - SUBJECTIVE AND OBJECTIVE BOX
Progress Note: Orthopaedic Surgery - Spine Service       Patient interviewed and examined at bedside, well-appearing and in no acute distress. States pain is overall improved from yesterday. Endorses predominantly "cramping" pain in the RUE>LUE, particularly in the hands, and RLE>LLE that is improved from her pre-operative baseline, now at a "6/10" in severity. Denies fevers, chills, headaches, chest pain, or shortness of breath.       VITAL SIGNS:  T(C): 36.7 (28 Feb 2023 05:21), Max: 37.8 (27 Feb 2023 16:35)  T(F): 98.1 (28 Feb 2023 05:21), Max: 100.1 (27 Feb 2023 16:35)  HR: 78 (28 Feb 2023 05:21) (74 - 108)  BP: 100/69 (28 Feb 2023 05:21) (100/69 - 115/73)  RR: 18 (28 Feb 2023 05:21) (17 - 18)  SpO2: 99% (28 Feb 2023 05:21) (96% - 99%)        LABS:                        12.7   17.90 )-----------( 241      ( 27 Feb 2023 05:30 )             37.8     02-27    137  |  102  |  17  ----------------------------<  102<H>  4.2   |  28  |  0.61    Ca    8.5      27 Feb 2023 05:30  Phos  4.6     02-27  Mg     2.5     02-27      PHYSICAL EXAM  GEN: NAD, AAOx3    SPINE:  Aquacel bandage clean, dry, and intact with KAREN x1 draining SS fluid.   Grossly moving all extremities.   SILT throughout all extremities.   + Radial pulses bilaterally.   + DP/PT pulses bilaterally.   No saddle anesthesia.       MOTOR EXAM:                          Elbow Flex (C5)     Wrist Ext (C6)     Elbow Ext (C7)      Finger Flex (C8)    Finger Abduction (T1)  RIGHT                 4/5                         5/5                         5/5                          5/5                              5/5  LEFT                    4/5                         5/5                         5/5                          5/5                              5/5                           Hip Flex (L2)      Knee Ext (L3)      Ank Dorsiflex (L4)     Hallux Ext (L5)     Ank PlantarFlex (S1)  RIGHT               5/5                      4/5                          5/5                            5/5                           5/5  LEFT                  5/5                      4/5                          5/5                            5/5                           5/5      *** Weakness on exam should be considered in the context of post-operative pain.       SENSORY EXAM:                        C5      C6      C7      C8       T1          RIGHT          1         2        2         1         1          (0=absent, 1=impaired, 2=normal, NT=not testable)  LEFT             2         2        2         2         2          (0=absent, 1=impaired, 2=normal, NT=not testable)                        L2        L3       L4      L5       S1          RIGHT        1          1         1        1        1           (0=absent, 1=impaired, 2=normal, NT=not testable)  LEFT           2          2         2        2        2           (0=absent, 1=impaired, 2=normal, NT=not testable)    Negative Perez's sign bilaterally.   Negative Babinski bilaterally.   Negative Myoclonus bilaterally.         ASSESSMENT:  67F status-post L4-S1 Decompression with Posterior Spinal Fusion POD #2; Stable.     PLAN:  - Pain control.   - DVT Ppx: Hold in the setting of recent spinal surgery due to increased risk of epidural hematoma.   - Drain in place draining sero-sanguinous fluid; Monitor output.   - WBAT / OOB with assistance as needed.   - Will order LSO for patient to be used while ambulatory.   - PT/OT.   - Encourage incentive spirometry.   - DC planning: Home vs [facility] per PT recommendations.   - Will discuss with Dr. Ernst and will advise if plan changes.

## 2023-02-28 NOTE — DIETITIAN INITIAL EVALUATION ADULT - OTHER INFO
Pt does not follow any dietary restrictions at home; reports good appetite & good PO intake PTA. Pt has supportive friend/neighbor who assists with food shopping/cooking. Pt continues with good PO intake during admission, consuming mostly % of meals. Denies any swallowing difficulty, however reports some difficulty chewing hard/tough/chewy foods, requests soft/easy to chew consistency. Denies any nausea or vomiting; with constipation (on laxative/stool softener).  Pt reports UBW approximately 130 lbs.; wt appears stable.

## 2023-02-28 NOTE — PROGRESS NOTE ADULT - ASSESSMENT
67 years old female with h/o sciatica pain admitted to [facility] for severe symptomatic spinal stenosis w/ bilateral foraminal stenosis. Associated with lower extremity numbness/weakness. sp L4-S1 Decompression with Posterior Spinal Fusion. POD #  2 on 2/26.     GENERAL MEDICINE  # mechanical fall  - trauma work up negative- CT head with no acute pathology. CT C spine with no fracture or malalignment. CT pelvis with no fracture or dislocation. CT T spine with no fracture. L spine noted multilevel degenerative changes. Moderate spinal canal stenosis at L5-S1, appears grossly unchanged from 12/7/2022  (2/23) Ortho consulted: Family meeting as per ortho regarding surgical intervention    ORTHOPEDIC  # severe lumbar spinal stenosis  # chronic low back pain  # right side sciatica   - MRI reviewed: Cervical spine: Disc bulges at C4-5 and C5-6 narrow the cervical canal and contact the spinal cord without cord abnormality.  - Lumbar spine: Advanced degenerative changes at L5-S1 severely narrow the thecal sac and compress the cauda equina nerves. Severe bilateral foraminal stenosis is also demonstrated L5-S1  PT consult  - (2/24) Patient amendable for surgical intervention if needed. Pt is fully orientated and able to make own decisions.   - (2/25) Ortho planning for laminectomy as per team.   - (2/26) s/p laminectomy by ortho, recovering in PACU; f/u op report, ortho recs  - (2/27) POD # 1 - no acute issues. prema drain in  - (2/28) POD # 2 - no acute events. feeling better. prema drain still in     GASTROENTEROLOGY  - Constipation  - milk of mag    PLAN  - c/w F  - follow up with ORTHOPEDICS on when pt is cleared for DC   - pain management  - bowel regimen

## 2023-02-28 NOTE — DIETITIAN INITIAL EVALUATION ADULT - PHYSCIAL ASSESSMENT
Pt appears well nourished with no physical signs of malnutrition except for mild orbital depletion noted

## 2023-02-28 NOTE — DIETITIAN INITIAL EVALUATION ADULT - PERTINENT MEDS FT
MEDICATIONS  (STANDING):  acetaminophen     Tablet .. 650 milliGRAM(s) Oral every 6 hours  ascorbic acid 500 milliGRAM(s) Oral two times a day  cyclobenzaprine 10 milliGRAM(s) Oral every 8 hours  folic acid 1 milliGRAM(s) Oral daily  HYDROmorphone  Injectable 0.5 milliGRAM(s) IV Push once  influenza  Vaccine (HIGH DOSE) 0.7 milliLiter(s) IntraMuscular once  lactated ringers. 1000 milliLiter(s) (100 mL/Hr) IV Continuous <Continuous>  multivitamin 1 Tablet(s) Oral daily  pantoprazole    Tablet 40 milliGRAM(s) Oral before breakfast  polyethylene glycol 3350 17 Gram(s) Oral daily  senna 2 Tablet(s) Oral at bedtime    MEDICATIONS  (PRN):  bisacodyl 5 milliGRAM(s) Oral every 12 hours PRN Constipation  magnesium hydroxide Suspension 30 milliLiter(s) Oral every 12 hours PRN Constipation  ondansetron Injectable 4 milliGRAM(s) IV Push every 6 hours PRN Nausea and/or Vomiting  oxyCODONE    IR 2.5 milliGRAM(s) Oral every 4 hours PRN Moderate Pain (4 - 6)  oxyCODONE    IR 5 milliGRAM(s) Oral every 4 hours PRN Severe Pain (7 - 10)  traMADol 50 milliGRAM(s) Oral every 6 hours PRN Mild Pain (1 - 3)

## 2023-02-28 NOTE — DIETITIAN INITIAL EVALUATION ADULT - PERTINENT LABORATORY DATA
02-28    139  |  101  |  16  ----------------------------<  86  4.0   |  30  |  0.61    Ca    7.9<L>      28 Feb 2023 05:25  Phos  3.3     02-28  Mg     2.6     02-28    TPro  5.7<L>  /  Alb  2.1<L>  /  TBili  0.3  /  DBili  x   /  AST  32  /  ALT  79<H>  /  AlkPhos  54  02-28

## 2023-03-01 LAB
ANION GAP SERPL CALC-SCNC: 7 MMOL/L — SIGNIFICANT CHANGE UP (ref 5–17)
BASOPHILS # BLD AUTO: 0.02 K/UL — SIGNIFICANT CHANGE UP (ref 0–0.2)
BASOPHILS NFR BLD AUTO: 0.2 % — SIGNIFICANT CHANGE UP (ref 0–2)
BUN SERPL-MCNC: 15 MG/DL — SIGNIFICANT CHANGE UP (ref 7–23)
CALCIUM SERPL-MCNC: 8.9 MG/DL — SIGNIFICANT CHANGE UP (ref 8.5–10.1)
CHLORIDE SERPL-SCNC: 102 MMOL/L — SIGNIFICANT CHANGE UP (ref 96–108)
CO2 SERPL-SCNC: 28 MMOL/L — SIGNIFICANT CHANGE UP (ref 22–31)
CREAT SERPL-MCNC: 0.6 MG/DL — SIGNIFICANT CHANGE UP (ref 0.5–1.3)
EGFR: 98 ML/MIN/1.73M2 — SIGNIFICANT CHANGE UP
EOSINOPHIL # BLD AUTO: 0.18 K/UL — SIGNIFICANT CHANGE UP (ref 0–0.5)
EOSINOPHIL NFR BLD AUTO: 1.4 % — SIGNIFICANT CHANGE UP (ref 0–6)
FLUAV AG NPH QL: SIGNIFICANT CHANGE UP
FLUBV AG NPH QL: SIGNIFICANT CHANGE UP
GLUCOSE SERPL-MCNC: 102 MG/DL — HIGH (ref 70–99)
HCT VFR BLD CALC: 39.1 % — SIGNIFICANT CHANGE UP (ref 34.5–45)
HGB BLD-MCNC: 12.9 G/DL — SIGNIFICANT CHANGE UP (ref 11.5–15.5)
IMM GRANULOCYTES NFR BLD AUTO: 0.9 % — SIGNIFICANT CHANGE UP (ref 0–0.9)
LYMPHOCYTES # BLD AUTO: 2.7 K/UL — SIGNIFICANT CHANGE UP (ref 1–3.3)
LYMPHOCYTES # BLD AUTO: 21 % — SIGNIFICANT CHANGE UP (ref 13–44)
MCHC RBC-ENTMCNC: 30.9 PG — SIGNIFICANT CHANGE UP (ref 27–34)
MCHC RBC-ENTMCNC: 33 G/DL — SIGNIFICANT CHANGE UP (ref 32–36)
MCV RBC AUTO: 93.8 FL — SIGNIFICANT CHANGE UP (ref 80–100)
MONOCYTES # BLD AUTO: 1.58 K/UL — HIGH (ref 0–0.9)
MONOCYTES NFR BLD AUTO: 12.3 % — SIGNIFICANT CHANGE UP (ref 2–14)
NEUTROPHILS # BLD AUTO: 8.28 K/UL — HIGH (ref 1.8–7.4)
NEUTROPHILS NFR BLD AUTO: 64.2 % — SIGNIFICANT CHANGE UP (ref 43–77)
NRBC # BLD: 0 /100 WBCS — SIGNIFICANT CHANGE UP (ref 0–0)
PLATELET # BLD AUTO: 219 K/UL — SIGNIFICANT CHANGE UP (ref 150–400)
POTASSIUM SERPL-MCNC: 4.3 MMOL/L — SIGNIFICANT CHANGE UP (ref 3.5–5.3)
POTASSIUM SERPL-SCNC: 4.3 MMOL/L — SIGNIFICANT CHANGE UP (ref 3.5–5.3)
RBC # BLD: 4.17 M/UL — SIGNIFICANT CHANGE UP (ref 3.8–5.2)
RBC # FLD: 13.1 % — SIGNIFICANT CHANGE UP (ref 10.3–14.5)
SARS-COV-2 RNA SPEC QL NAA+PROBE: SIGNIFICANT CHANGE UP
SODIUM SERPL-SCNC: 137 MMOL/L — SIGNIFICANT CHANGE UP (ref 135–145)
WBC # BLD: 12.88 K/UL — HIGH (ref 3.8–10.5)
WBC # FLD AUTO: 12.88 K/UL — HIGH (ref 3.8–10.5)

## 2023-03-01 PROCEDURE — 99233 SBSQ HOSP IP/OBS HIGH 50: CPT

## 2023-03-01 RX ORDER — CEFAZOLIN SODIUM 1 G
2000 VIAL (EA) INJECTION EVERY 8 HOURS
Refills: 0 | Status: DISCONTINUED | OUTPATIENT
Start: 2023-03-01 | End: 2023-03-03

## 2023-03-01 RX ORDER — ALBUTEROL 90 UG/1
2 AEROSOL, METERED ORAL EVERY 6 HOURS
Refills: 0 | Status: DISCONTINUED | OUTPATIENT
Start: 2023-03-01 | End: 2023-03-03

## 2023-03-01 RX ORDER — MONTELUKAST 4 MG/1
10 TABLET, CHEWABLE ORAL AT BEDTIME
Refills: 0 | Status: DISCONTINUED | OUTPATIENT
Start: 2023-03-01 | End: 2023-03-03

## 2023-03-01 RX ADMIN — Medication 650 MILLIGRAM(S): at 18:58

## 2023-03-01 RX ADMIN — Medication 500 MILLIGRAM(S): at 18:58

## 2023-03-01 RX ADMIN — Medication 650 MILLIGRAM(S): at 11:45

## 2023-03-01 RX ADMIN — Medication 100 MILLIGRAM(S): at 21:38

## 2023-03-01 RX ADMIN — Medication 1 TABLET(S): at 11:42

## 2023-03-01 RX ADMIN — Medication 100 MILLIGRAM(S): at 13:43

## 2023-03-01 RX ADMIN — Medication 500 MILLIGRAM(S): at 05:48

## 2023-03-01 RX ADMIN — Medication 650 MILLIGRAM(S): at 05:48

## 2023-03-01 RX ADMIN — Medication 650 MILLIGRAM(S): at 19:30

## 2023-03-01 RX ADMIN — PANTOPRAZOLE SODIUM 40 MILLIGRAM(S): 20 TABLET, DELAYED RELEASE ORAL at 08:22

## 2023-03-01 RX ADMIN — Medication 650 MILLIGRAM(S): at 12:45

## 2023-03-01 RX ADMIN — ALBUTEROL 2 PUFF(S): 90 AEROSOL, METERED ORAL at 21:38

## 2023-03-01 RX ADMIN — Medication 100 MILLIGRAM(S): at 05:55

## 2023-03-01 RX ADMIN — Medication 650 MILLIGRAM(S): at 06:48

## 2023-03-01 RX ADMIN — CYCLOBENZAPRINE HYDROCHLORIDE 10 MILLIGRAM(S): 10 TABLET, FILM COATED ORAL at 13:43

## 2023-03-01 RX ADMIN — CYCLOBENZAPRINE HYDROCHLORIDE 10 MILLIGRAM(S): 10 TABLET, FILM COATED ORAL at 21:38

## 2023-03-01 RX ADMIN — POLYETHYLENE GLYCOL 3350 17 GRAM(S): 17 POWDER, FOR SOLUTION ORAL at 11:43

## 2023-03-01 RX ADMIN — MONTELUKAST 10 MILLIGRAM(S): 4 TABLET, CHEWABLE ORAL at 21:37

## 2023-03-01 RX ADMIN — SENNA PLUS 2 TABLET(S): 8.6 TABLET ORAL at 21:37

## 2023-03-01 RX ADMIN — Medication 650 MILLIGRAM(S): at 00:19

## 2023-03-01 RX ADMIN — Medication 1 MILLIGRAM(S): at 11:44

## 2023-03-01 RX ADMIN — CYCLOBENZAPRINE HYDROCHLORIDE 10 MILLIGRAM(S): 10 TABLET, FILM COATED ORAL at 05:48

## 2023-03-01 NOTE — PROGRESS NOTE ADULT - ASSESSMENT
67 years old female with h/o sciatica pain, intermittent asthma admitted to [facility] for severe symptomatic spinal stenosis w/ bilateral foraminal stenosis. Associated with lower extremity numbness/weakness. sp L4-S1 Decompression with Posterior Spinal Fusion  2/26.           # severe lumbar spinal stenosis s/p laminectomy by Ortho 2/26--> KAREN drain still in place   # chronic low back pain  # right side sciatica   # mechanical fall  - trauma work up negative- CT head with no acute pathology. CT C spine with no fracture or malalignment. CT pelvis with no fracture or dislocation. CT T spine with no fracture. L spine noted multilevel degenerative changes. Moderate spinal canal stenosis at L5-S1, appears grossly unchanged from 12/7/2022  - MRI reviewed: Cervical spine: Disc bulges at C4-5 and C5-6 narrow the cervical canal and contact the spinal cord without cord abnormality.  - Lumbar spine: Advanced degenerative changes at L5-S1 severely narrow the thecal sac and compress the cauda equina nerves. Severe bilateral foraminal stenosis is also demonstrated L5-S1  --pain control prn, bowel regimen   --KAREN drain management per ortho   --DVT ppx per ortho   --PT      - Constipation  + gas , no BM yet   continue with bowel regimen     intermittent asthma , not in exacerbation   c/w albuterol HFA prn , singulair qhs     Preventative measures   SCDs-dvt ppx  fall precautions

## 2023-03-01 NOTE — PROGRESS NOTE ADULT - SUBJECTIVE AND OBJECTIVE BOX
Patient is a 67y old  Female who presents with a chief complaint of fall, sciatica pain with RLE weakness/numbness (28 Feb 2023 06:00)      Patient seen and examined bedside.   no overnight events   states she is passing gas but no BM yet   states pain is controlled     REVIEW OF SYSTEMS: remaining ROS negative     MEDICATIONS  (STANDING):  acetaminophen     Tablet .. 650 milliGRAM(s) Oral every 6 hours  ascorbic acid 500 milliGRAM(s) Oral two times a day  cyclobenzaprine 10 milliGRAM(s) Oral every 8 hours  folic acid 1 milliGRAM(s) Oral daily  HYDROmorphone  Injectable 0.5 milliGRAM(s) IV Push once  influenza  Vaccine (HIGH DOSE) 0.7 milliLiter(s) IntraMuscular once  lactated ringers. 1000 milliLiter(s) (100 mL/Hr) IV Continuous <Continuous>  multivitamin 1 Tablet(s) Oral daily  pantoprazole    Tablet 40 milliGRAM(s) Oral before breakfast  polyethylene glycol 3350 17 Gram(s) Oral daily  senna 2 Tablet(s) Oral at bedtime    MEDICATIONS  (PRN):  bisacodyl 5 milliGRAM(s) Oral every 12 hours PRN Constipation  magnesium hydroxide Suspension 30 milliLiter(s) Oral every 12 hours PRN Constipation  ondansetron Injectable 4 milliGRAM(s) IV Push every 6 hours PRN Nausea and/or Vomiting  oxyCODONE    IR 2.5 milliGRAM(s) Oral every 4 hours PRN Moderate Pain (4 - 6)  oxyCODONE    IR 5 milliGRAM(s) Oral every 4 hours PRN Severe Pain (7 - 10)  traMADol 50 milliGRAM(s) Oral every 6 hours PRN Mild Pain (1 - 3)    Allergies    No Known Allergies    Intolerances        Vital Signs Last 24 Hrs  T(C): 37.1 (01 Mar 2023 05:21), Max: 37.3 (28 Feb 2023 11:29)  T(F): 98.7 (01 Mar 2023 05:21), Max: 99.1 (28 Feb 2023 11:29)  HR: 75 (01 Mar 2023 05:21) (71 - 90)  BP: 104/70 (01 Mar 2023 05:21) (97/65 - 108/73)  BP(mean): --  RR: 18 (01 Mar 2023 05:21) (16 - 18)  SpO2: 97% (01 Mar 2023 05:21) (95% - 97%)    Parameters below as of 01 Mar 2023 05:21  Patient On (Oxygen Delivery Method): room air                PHYSICAL EXAM:  GENERAL: NAD,  no increased WOB   HEAD:  Atraumatic, Normocephalic  EYES: EOMI, PERRLA, conjunctiva and sclera clear  ENMT: No tonsillar erythema, exudates, or enlargement; Moist mucous membranes   NECK: Supple, No JVD   NERVOUS SYSTEM:  Alert & Oriented X3, Good concentration; nonfocal ; strength 4/5 throughout   CHEST/LUNG: CTAB  HEART: Regular rate and rhythm; No murmurs, rubs, or gallops  ABDOMEN: Soft, Nontender, Nondistended; Bowel sounds present  EXTREMITIES:  good pedal pulses b/l. no edema or calf tenderness b/l   Lines: Dick draing- serosanginous fluid in balloon    Labs                          12.9   12.88 )-----------( 219      ( 01 Mar 2023 06:55 )             39.1   03-01    137  |  102  |  15  ----------------------------<  102<H>  4.3   |  28  |  0.60    Ca    8.9      01 Mar 2023 06:55  Phos  3.3     02-28  Mg     2.6     02-28    TPro  5.7<L>  /  Alb  2.1<L>  /  TBili  0.3  /  DBili  x   /  AST  32  /  ALT  79<H>  /  AlkPhos  54  02-28          Consultant(s) Notes Reveiwed [x ] Yes     Care Discussed with [x ] Consultants  [x ] Patient  [ ] Family  [ x] /   [x ] Other; RN

## 2023-03-01 NOTE — PROGRESS NOTE ADULT - SUBJECTIVE AND OBJECTIVE BOX
Patient seen and examined at bedside. Patient reports pain well controlled on medications. No acute events overnight. Pt denies fevers, chills, new onset numbness, weakness or tingling in the extremities.    T(C): 37.1 (03-01-23 @ 05:21), Max: 37.3 (02-28-23 @ 11:29)  T(F): 98.7 (03-01-23 @ 05:21), Max: 99.1 (02-28-23 @ 11:29)  HR: 75 (03-01-23 @ 05:21) (71 - 90)  BP: 104/70 (03-01-23 @ 05:21) (97/65 - 108/73)  RR: 18 (03-01-23 @ 05:21) (16 - 18)  SpO2: 97% (03-01-23 @ 05:21) (95% - 97%)                        12.9   14.99 )-----------( 237      ( 28 Feb 2023 05:25 )             38.4   02-28    139  |  101  |  16  ----------------------------<  86  4.0   |  30  |  0.61    Ca    7.9<L>      28 Feb 2023 05:25  Phos  3.3     02-28  Mg     2.6     02-28    TPro  5.7<L>  /  Alb  2.1<L>  /  TBili  0.3  /  DBili  x   /  AST  32  /  ALT  79<H>  /  AlkPhos  54  02-28    PHYSICAL EXAM  GEN: NAD, AAOx3    SPINE:  Aquacel bandage clean, dry, and intact with KAREN x1 draining SS fluid.   Grossly moving all extremities.   SILT throughout all extremities.   + Radial pulses bilaterally.   + DP/PT pulses bilaterally.   No saddle anesthesia.       MOTOR EXAM:                          Elbow Flex (C5)     Wrist Ext (C6)     Elbow Ext (C7)      Finger Flex (C8)    Finger Abduction (T1)  RIGHT                 4/5                         5/5                         5/5                          5/5                              5/5  LEFT                    4/5                         5/5                         5/5                          5/5                              5/5                           Hip Flex (L2)      Knee Ext (L3)      Ank Dorsiflex (L4)     Hallux Ext (L5)     Ank PlantarFlex (S1)  RIGHT               5/5                      4/5                          5/5                            5/5                           5/5  LEFT                  5/5                      4/5                          5/5                            5/5                           5/5      *** Weakness on exam should be considered in the context of post-operative pain.       SENSORY EXAM:                        C5      C6      C7      C8       T1          RIGHT          1         2        2         1         1          (0=absent, 1=impaired, 2=normal, NT=not testable)  LEFT             2         2        2         2         2          (0=absent, 1=impaired, 2=normal, NT=not testable)                        L2        L3       L4      L5       S1          RIGHT        1          1         1        1        1           (0=absent, 1=impaired, 2=normal, NT=not testable)  LEFT           2          2         2        2        2           (0=absent, 1=impaired, 2=normal, NT=not testable)    Negative Perez's sign bilaterally.   Negative Babinski bilaterally.   Negative Myoclonus bilaterally.                           12.9   14.99 )-----------( 237      ( 28 Feb 2023 05:25 )             38.4   02-28    139  |  101  |  16  ----------------------------<  86  4.0   |  30  |  0.61    Ca    7.9<L>      28 Feb 2023 05:25  Phos  3.3     02-28  Mg     2.6     02-28    TPro  5.7<L>  /  Alb  2.1<L>  /  TBili  0.3  /  DBili  x   /  AST  32  /  ALT  79<H>  /  AlkPhos  54  02-28    ASSESSMENT:  67F status-post L4-S1 Decompression with Posterior Spinal Fusion POD #3; Stable.     PLAN:  - Pain control.   - DVT Ppx: Hold in the setting of recent spinal surgery due to increased risk of epidural hematoma.   - Drain in place draining sero-sanguinous fluid; Monitor output.   - WBAT / OOB with assistance as needed.   - Will order LSO for patient to be used while ambulatory.   - PT/OT.   - Encourage incentive spirometry.   - DC planning: Home vs [facility] per PT recommendations.   - Will discuss with Dr. Ernst and will advise if plan changes.

## 2023-03-02 LAB
GLUCOSE BLDC GLUCOMTR-MCNC: 109 MG/DL — HIGH (ref 70–99)

## 2023-03-02 PROCEDURE — 99232 SBSQ HOSP IP/OBS MODERATE 35: CPT

## 2023-03-02 RX ADMIN — Medication 100 MILLIGRAM(S): at 14:05

## 2023-03-02 RX ADMIN — TRAMADOL HYDROCHLORIDE 50 MILLIGRAM(S): 50 TABLET ORAL at 12:00

## 2023-03-02 RX ADMIN — Medication 500 MILLIGRAM(S): at 06:00

## 2023-03-02 RX ADMIN — Medication 1 TABLET(S): at 11:21

## 2023-03-02 RX ADMIN — Medication 650 MILLIGRAM(S): at 04:00

## 2023-03-02 RX ADMIN — OXYCODONE HYDROCHLORIDE 5 MILLIGRAM(S): 5 TABLET ORAL at 21:20

## 2023-03-02 RX ADMIN — Medication 1 MILLIGRAM(S): at 11:21

## 2023-03-02 RX ADMIN — Medication 100 MILLIGRAM(S): at 21:53

## 2023-03-02 RX ADMIN — PANTOPRAZOLE SODIUM 40 MILLIGRAM(S): 20 TABLET, DELAYED RELEASE ORAL at 05:59

## 2023-03-02 RX ADMIN — Medication 500 MILLIGRAM(S): at 18:09

## 2023-03-02 RX ADMIN — Medication 650 MILLIGRAM(S): at 11:22

## 2023-03-02 RX ADMIN — TRAMADOL HYDROCHLORIDE 50 MILLIGRAM(S): 50 TABLET ORAL at 11:21

## 2023-03-02 RX ADMIN — MONTELUKAST 10 MILLIGRAM(S): 4 TABLET, CHEWABLE ORAL at 21:53

## 2023-03-02 RX ADMIN — Medication 650 MILLIGRAM(S): at 18:09

## 2023-03-02 RX ADMIN — OXYCODONE HYDROCHLORIDE 5 MILLIGRAM(S): 5 TABLET ORAL at 20:21

## 2023-03-02 RX ADMIN — Medication 650 MILLIGRAM(S): at 11:52

## 2023-03-02 RX ADMIN — CYCLOBENZAPRINE HYDROCHLORIDE 10 MILLIGRAM(S): 10 TABLET, FILM COATED ORAL at 06:00

## 2023-03-02 RX ADMIN — Medication 650 MILLIGRAM(S): at 03:23

## 2023-03-02 RX ADMIN — POLYETHYLENE GLYCOL 3350 17 GRAM(S): 17 POWDER, FOR SOLUTION ORAL at 11:21

## 2023-03-02 RX ADMIN — SENNA PLUS 2 TABLET(S): 8.6 TABLET ORAL at 21:53

## 2023-03-02 RX ADMIN — Medication 100 MILLIGRAM(S): at 06:00

## 2023-03-02 RX ADMIN — CYCLOBENZAPRINE HYDROCHLORIDE 10 MILLIGRAM(S): 10 TABLET, FILM COATED ORAL at 14:04

## 2023-03-02 NOTE — PROGRESS NOTE ADULT - SUBJECTIVE AND OBJECTIVE BOX
Patient is a 67y old  Female who presents with a chief complaint of fall, sciatica pain with RLE weakness/numbness (28 Feb 2023 06:00)      Patient seen and examined bedside.   no overnight events       REVIEW OF SYSTEMS: remaining ROS negative     MEDICATIONS  (STANDING):  acetaminophen     Tablet .. 650 milliGRAM(s) Oral every 6 hours  ascorbic acid 500 milliGRAM(s) Oral two times a day  cyclobenzaprine 10 milliGRAM(s) Oral every 8 hours  folic acid 1 milliGRAM(s) Oral daily  HYDROmorphone  Injectable 0.5 milliGRAM(s) IV Push once  influenza  Vaccine (HIGH DOSE) 0.7 milliLiter(s) IntraMuscular once  lactated ringers. 1000 milliLiter(s) (100 mL/Hr) IV Continuous <Continuous>  multivitamin 1 Tablet(s) Oral daily  pantoprazole    Tablet 40 milliGRAM(s) Oral before breakfast  polyethylene glycol 3350 17 Gram(s) Oral daily  senna 2 Tablet(s) Oral at bedtime    MEDICATIONS  (PRN):  bisacodyl 5 milliGRAM(s) Oral every 12 hours PRN Constipation  magnesium hydroxide Suspension 30 milliLiter(s) Oral every 12 hours PRN Constipation  ondansetron Injectable 4 milliGRAM(s) IV Push every 6 hours PRN Nausea and/or Vomiting  oxyCODONE    IR 2.5 milliGRAM(s) Oral every 4 hours PRN Moderate Pain (4 - 6)  oxyCODONE    IR 5 milliGRAM(s) Oral every 4 hours PRN Severe Pain (7 - 10)  traMADol 50 milliGRAM(s) Oral every 6 hours PRN Mild Pain (1 - 3)    Allergies    No Known Allergies    Intolerances        Vital Signs Last 24 Hrs  T(C): 36.9 (02 Mar 2023 20:14), Max: 37.1 (02 Mar 2023 16:45)  T(F): 98.4 (02 Mar 2023 20:14), Max: 98.8 (02 Mar 2023 16:45)  HR: 83 (02 Mar 2023 20:14) (83 - 99)  BP: 102/71 (02 Mar 2023 20:14) (100/66 - 116/79)  BP(mean): --  RR: 18 (02 Mar 2023 20:14) (17 - 18)  SpO2: 98% (02 Mar 2023 20:14) (97% - 99%)    Parameters below as of 02 Mar 2023 20:14  Patient On (Oxygen Delivery Method): room air          PHYSICAL EXAM:  GENERAL: NAD,  no increased WOB   HEAD:  Atraumatic, Normocephalic  EYES: EOMI, PERRLA, conjunctiva and sclera clear  ENMT: No tonsillar erythema, exudates, or enlargement; Moist mucous membranes   NECK: Supple, No JVD   NERVOUS SYSTEM:  Alert & Oriented X3, Good concentration; nonfocal ; strength 4/5 throughout   CHEST/LUNG: CTAB  HEART: Regular rate and rhythm; No murmurs, rubs, or gallops  ABDOMEN: Soft, Nontender, Nondistended; Bowel sounds present  EXTREMITIES:  good pedal pulses b/l. no edema or calf tenderness b/l   Lines: Dick draing- serosanginous fluid in balloon    Labs                                     12.9   12.88 )-----------( 219      ( 01 Mar 2023 06:55 )             39.1   03-01    137  |  102  |  15  ----------------------------<  102<H>  4.3   |  28  |  0.60    Ca    8.9      01 Mar 2023 06:55          Consultant(s) Notes Reveiwed [x ] Yes     Care Discussed with [x ] Consultants  [x ] Patient  [ ] Family  [ x] /   [x ] Other; RN

## 2023-03-02 NOTE — PROGRESS NOTE ADULT - ASSESSMENT
67 years old female with h/o sciatica pain, intermittent asthma admitted to [facility] for severe symptomatic spinal stenosis w/ bilateral foraminal stenosis. Associated with lower extremity numbness/weakness. sp L4-S1 Decompression with Posterior Spinal Fusion  2/26.     3/2 discussed with Ortho , patient may be scheduled for ACDF tomorrow 3/3 . PATIENT MEDICALLY OPTIMIZED FOR THE PROCEDURE           # severe lumbar spinal stenosis s/p laminectomy by Ortho 2/26--> KAREN drain still in place   # chronic low back pain  # right side sciatica   # mechanical fall  - trauma work up negative- CT head with no acute pathology. CT C spine with no fracture or malalignment. CT pelvis with no fracture or dislocation. CT T spine with no fracture. L spine noted multilevel degenerative changes. Moderate spinal canal stenosis at L5-S1, appears grossly unchanged from 12/7/2022  - MRI reviewed: Cervical spine: Disc bulges at C4-5 and C5-6 narrow the cervical canal and contact the spinal cord without cord abnormality.  - Lumbar spine: Advanced degenerative changes at L5-S1 severely narrow the thecal sac and compress the cauda equina nerves. Severe bilateral foraminal stenosis is also demonstrated L5-S1  --pain control prn, bowel regimen   --KAREN drain management per ortho   --DVT ppx per ortho   --PT :[facility]      - Constipation  + gas , no BM yet   continue with bowel regimen     intermittent asthma , not in exacerbation   c/w albuterol HFA prn , singulair qhs     Preventative measures   SCDs-dvt ppx  fall precautions

## 2023-03-02 NOTE — PROGRESS NOTE ADULT - SUBJECTIVE AND OBJECTIVE BOX
Patient seen and examined at bedside. Patient reports pain well controlled on medications. No acute events overnight. Pt reports overnight fevers, chills, denies new onset numbness, weakness or tingling in the extremities.    T(C): 36.8 (03-02-23 @ 05:12), Max: 36.9 (03-01-23 @ 11:22)  T(F): 98.3 (03-02-23 @ 05:12), Max: 98.4 (03-01-23 @ 11:22)  HR: 83 (03-02-23 @ 05:12) (83 - 100)  BP: 116/79 (03-02-23 @ 05:12) (105/65 - 116/79)  RR: 18 (03-02-23 @ 05:12) (17 - 18)  SpO2: 98% (03-02-23 @ 05:12) (97% - 99%)                          12.9   12.88 )-----------( 219      ( 01 Mar 2023 06:55 )             39.1   03-01    137  |  102  |  15  ----------------------------<  102<H>  4.3   |  28  |  0.60    Ca    8.9      01 Mar 2023 06:55    PHYSICAL EXAM  GEN: NAD, AAOx3    SPINE:  Aquacel bandage clean, dry, and intact with KAREN x1 draining SS fluid.   Grossly moving all extremities.   SILT throughout all extremities.   + Radial pulses bilaterally.   + DP/PT pulses bilaterally.   No saddle anesthesia.       MOTOR EXAM:                          Elbow Flex (C5)     Wrist Ext (C6)     Elbow Ext (C7)      Finger Flex (C8)    Finger Abduction (T1)  RIGHT                 4/5                         5/5                         5/5                          5/5                              5/5  LEFT                    4/5                         5/5                         5/5                          5/5                              5/5                           Hip Flex (L2)      Knee Ext (L3)      Ank Dorsiflex (L4)     Hallux Ext (L5)     Ank PlantarFlex (S1)  RIGHT               5/5                      4/5                          5/5                            5/5                           5/5  LEFT                  5/5                      4/5                          5/5                            5/5                           5/5      *** Weakness on exam should be considered in the context of post-operative pain.       SENSORY EXAM:                        C5      C6      C7      C8       T1          RIGHT          1         2        2         1         1          (0=absent, 1=impaired, 2=normal, NT=not testable)  LEFT             2         2        2         2         2          (0=absent, 1=impaired, 2=normal, NT=not testable)                        L2        L3       L4      L5       S1          RIGHT        1          1         1        1        1           (0=absent, 1=impaired, 2=normal, NT=not testable)  LEFT           2          2         2        2        2           (0=absent, 1=impaired, 2=normal, NT=not testable)    Negative Perez's sign bilaterally.   Negative Babinski bilaterally.   Negative Myoclonus bilaterally.     ASSESSMENT:  67F status-post L4-S1 Decompression with Posterior Spinal Fusion POD #4; Stable.     PLAN:  - Pain control.   - DVT Ppx: Hold in the setting of recent spinal surgery due to increased risk of epidural hematoma.   - Drain in place draining sero-sanguinous fluid; Monitor output.   - WBAT / OOB with assistance as needed.   - Will order LSO for patient to be used while ambulatory.   - PT/OT.   - Encourage incentive spirometry.   - DC planning: Home vs [facility] per PT recommendations.   - Will discuss with Dr. Ernst and will advise if plan changes.   Patient seen and examined at bedside. Patient reports pain well controlled on medications. No acute events overnight. Pt reports denies fevers, chills, new onset numbness, weakness or tingling in the extremities.    T(C): 36.8 (03-02-23 @ 05:12), Max: 36.9 (03-01-23 @ 11:22)  T(F): 98.3 (03-02-23 @ 05:12), Max: 98.4 (03-01-23 @ 11:22)  HR: 83 (03-02-23 @ 05:12) (83 - 100)  BP: 116/79 (03-02-23 @ 05:12) (105/65 - 116/79)  RR: 18 (03-02-23 @ 05:12) (17 - 18)  SpO2: 98% (03-02-23 @ 05:12) (97% - 99%)                          12.9   12.88 )-----------( 219      ( 01 Mar 2023 06:55 )             39.1   03-01    137  |  102  |  15  ----------------------------<  102<H>  4.3   |  28  |  0.60    Ca    8.9      01 Mar 2023 06:55    PHYSICAL EXAM  GEN: NAD, AAOx3    SPINE:  Aquacel bandage clean, dry, and intact with KAREN x1 draining SS fluid.   Grossly moving all extremities.   SILT throughout all extremities.   + Radial pulses bilaterally.   + DP/PT pulses bilaterally.   No saddle anesthesia.       MOTOR EXAM:                          Elbow Flex (C5)     Wrist Ext (C6)     Elbow Ext (C7)      Finger Flex (C8)    Finger Abduction (T1)  RIGHT                 4/5                         5/5                         5/5                          5/5                              5/5  LEFT                    4/5                         5/5                         5/5                          5/5                              5/5                           Hip Flex (L2)      Knee Ext (L3)      Ank Dorsiflex (L4)     Hallux Ext (L5)     Ank PlantarFlex (S1)  RIGHT               5/5                      4/5                          5/5                            5/5                           5/5  LEFT                  5/5                      4/5                          5/5                            5/5                           5/5      *** Weakness on exam should be considered in the context of post-operative pain.       SENSORY EXAM:                        C5      C6      C7      C8       T1          RIGHT          1         2        2         1         1          (0=absent, 1=impaired, 2=normal, NT=not testable)  LEFT             2         2        2         2         2          (0=absent, 1=impaired, 2=normal, NT=not testable)                        L2        L3       L4      L5       S1          RIGHT        1          1         1        1        1           (0=absent, 1=impaired, 2=normal, NT=not testable)  LEFT           2          2         2        2        2           (0=absent, 1=impaired, 2=normal, NT=not testable)    Negative Perez's sign bilaterally.   Negative Babinski bilaterally.   Negative Myoclonus bilaterally.     ASSESSMENT:  67F status-post L4-S1 Decompression with Posterior Spinal Fusion POD #4; Stable.     PLAN:  - Pain control.   - DVT Ppx: Hold in the setting of recent spinal surgery due to increased risk of epidural hematoma.   - Drain in place draining sero-sanguinous fluid; Monitor output.   - WBAT / OOB with assistance as needed.   - Will order LSO for patient to be used while ambulatory.   - PT/OT.   - Encourage incentive spirometry.   - DC planning: Home vs [facility] per PT recommendations.   - Will discuss with Dr. Ernst and will advise if plan changes.

## 2023-03-03 LAB
ANION GAP SERPL CALC-SCNC: 8 MMOL/L — SIGNIFICANT CHANGE UP (ref 5–17)
ANION GAP SERPL CALC-SCNC: 9 MMOL/L — SIGNIFICANT CHANGE UP (ref 5–17)
APTT BLD: 29.6 SEC — SIGNIFICANT CHANGE UP (ref 27.5–35.5)
BASOPHILS # BLD AUTO: 0.04 K/UL — SIGNIFICANT CHANGE UP (ref 0–0.2)
BASOPHILS NFR BLD AUTO: 0.3 % — SIGNIFICANT CHANGE UP (ref 0–2)
BUN SERPL-MCNC: 17 MG/DL — SIGNIFICANT CHANGE UP (ref 7–23)
CALCIUM SERPL-MCNC: 8.8 MG/DL — SIGNIFICANT CHANGE UP (ref 8.5–10.1)
CALCIUM SERPL-MCNC: 9.3 MG/DL — SIGNIFICANT CHANGE UP (ref 8.5–10.1)
CHLORIDE SERPL-SCNC: 100 MMOL/L — SIGNIFICANT CHANGE UP (ref 96–108)
CHLORIDE SERPL-SCNC: 96 MMOL/L — SIGNIFICANT CHANGE UP (ref 96–108)
CO2 SERPL-SCNC: 25 MMOL/L — SIGNIFICANT CHANGE UP (ref 22–31)
CO2 SERPL-SCNC: 28 MMOL/L — SIGNIFICANT CHANGE UP (ref 22–31)
CREAT SERPL-MCNC: 0.63 MG/DL — SIGNIFICANT CHANGE UP (ref 0.5–1.3)
CREAT SERPL-MCNC: 0.68 MG/DL — SIGNIFICANT CHANGE UP (ref 0.5–1.3)
EGFR: 95 ML/MIN/1.73M2 — SIGNIFICANT CHANGE UP
EGFR: 97 ML/MIN/1.73M2 — SIGNIFICANT CHANGE UP
EOSINOPHIL # BLD AUTO: 0.27 K/UL — SIGNIFICANT CHANGE UP (ref 0–0.5)
EOSINOPHIL NFR BLD AUTO: 2.2 % — SIGNIFICANT CHANGE UP (ref 0–6)
GLUCOSE SERPL-MCNC: 108 MG/DL — HIGH (ref 70–99)
GLUCOSE SERPL-MCNC: 99 MG/DL — SIGNIFICANT CHANGE UP (ref 70–99)
HCT VFR BLD CALC: 37.1 % — SIGNIFICANT CHANGE UP (ref 34.5–45)
HCT VFR BLD CALC: 40.4 % — SIGNIFICANT CHANGE UP (ref 34.5–45)
HGB BLD-MCNC: 12.5 G/DL — SIGNIFICANT CHANGE UP (ref 11.5–15.5)
HGB BLD-MCNC: 13.5 G/DL — SIGNIFICANT CHANGE UP (ref 11.5–15.5)
IMM GRANULOCYTES NFR BLD AUTO: 1 % — HIGH (ref 0–0.9)
INR BLD: 0.98 RATIO — SIGNIFICANT CHANGE UP (ref 0.88–1.16)
LYMPHOCYTES # BLD AUTO: 19.9 % — SIGNIFICANT CHANGE UP (ref 13–44)
LYMPHOCYTES # BLD AUTO: 2.48 K/UL — SIGNIFICANT CHANGE UP (ref 1–3.3)
MCHC RBC-ENTMCNC: 30.8 PG — SIGNIFICANT CHANGE UP (ref 27–34)
MCHC RBC-ENTMCNC: 31.3 PG — SIGNIFICANT CHANGE UP (ref 27–34)
MCHC RBC-ENTMCNC: 33.4 G/DL — SIGNIFICANT CHANGE UP (ref 32–36)
MCHC RBC-ENTMCNC: 33.7 G/DL — SIGNIFICANT CHANGE UP (ref 32–36)
MCV RBC AUTO: 92 FL — SIGNIFICANT CHANGE UP (ref 80–100)
MCV RBC AUTO: 93 FL — SIGNIFICANT CHANGE UP (ref 80–100)
MONOCYTES # BLD AUTO: 1.63 K/UL — HIGH (ref 0–0.9)
MONOCYTES NFR BLD AUTO: 13.1 % — SIGNIFICANT CHANGE UP (ref 2–14)
NEUTROPHILS # BLD AUTO: 7.93 K/UL — HIGH (ref 1.8–7.4)
NEUTROPHILS NFR BLD AUTO: 63.5 % — SIGNIFICANT CHANGE UP (ref 43–77)
NRBC # BLD: 0 /100 WBCS — SIGNIFICANT CHANGE UP (ref 0–0)
PLATELET # BLD AUTO: 255 K/UL — SIGNIFICANT CHANGE UP (ref 150–400)
PLATELET # BLD AUTO: 269 K/UL — SIGNIFICANT CHANGE UP (ref 150–400)
POTASSIUM SERPL-MCNC: 4.7 MMOL/L — SIGNIFICANT CHANGE UP (ref 3.5–5.3)
POTASSIUM SERPL-MCNC: 4.9 MMOL/L — SIGNIFICANT CHANGE UP (ref 3.5–5.3)
POTASSIUM SERPL-SCNC: 4.7 MMOL/L — SIGNIFICANT CHANGE UP (ref 3.5–5.3)
POTASSIUM SERPL-SCNC: 4.9 MMOL/L — SIGNIFICANT CHANGE UP (ref 3.5–5.3)
PROTHROM AB SERPL-ACNC: 11.7 SEC — SIGNIFICANT CHANGE UP (ref 10.5–13.4)
RBC # BLD: 3.99 M/UL — SIGNIFICANT CHANGE UP (ref 3.8–5.2)
RBC # BLD: 4.39 M/UL — SIGNIFICANT CHANGE UP (ref 3.8–5.2)
RBC # FLD: 13 % — SIGNIFICANT CHANGE UP (ref 10.3–14.5)
RBC # FLD: 13.1 % — SIGNIFICANT CHANGE UP (ref 10.3–14.5)
SODIUM SERPL-SCNC: 132 MMOL/L — LOW (ref 135–145)
SODIUM SERPL-SCNC: 134 MMOL/L — LOW (ref 135–145)
WBC # BLD: 11.84 K/UL — HIGH (ref 3.8–10.5)
WBC # BLD: 12.47 K/UL — HIGH (ref 3.8–10.5)
WBC # FLD AUTO: 11.84 K/UL — HIGH (ref 3.8–10.5)
WBC # FLD AUTO: 12.47 K/UL — HIGH (ref 3.8–10.5)

## 2023-03-03 PROCEDURE — 99232 SBSQ HOSP IP/OBS MODERATE 35: CPT

## 2023-03-03 DEVICE — CAGE MODULUS CERV 7 DEG 7X15X12MM: Type: IMPLANTABLE DEVICE | Site: CERVICAL | Status: FUNCTIONAL

## 2023-03-03 DEVICE — SURGIFLO MATRIX WITH THROMBIN KIT: Type: IMPLANTABLE DEVICE | Site: CERVICAL | Status: FUNCTIONAL

## 2023-03-03 DEVICE — IMPLANTABLE DEVICE: Type: IMPLANTABLE DEVICE | Site: CERVICAL | Status: FUNCTIONAL

## 2023-03-03 DEVICE — BONE WAX 2.5G NON ABSORBABLE: Type: IMPLANTABLE DEVICE | Site: CERVICAL | Status: FUNCTIONAL

## 2023-03-03 RX ORDER — CEFAZOLIN SODIUM 1 G
2000 VIAL (EA) INJECTION EVERY 8 HOURS
Refills: 0 | Status: DISCONTINUED | OUTPATIENT
Start: 2023-03-04 | End: 2023-03-04

## 2023-03-03 RX ORDER — OXYCODONE HYDROCHLORIDE 5 MG/1
10 TABLET ORAL EVERY 4 HOURS
Refills: 0 | Status: DISCONTINUED | OUTPATIENT
Start: 2023-03-03 | End: 2023-03-09

## 2023-03-03 RX ORDER — FENTANYL CITRATE 50 UG/ML
25 INJECTION INTRAVENOUS
Refills: 0 | Status: DISCONTINUED | OUTPATIENT
Start: 2023-03-03 | End: 2023-03-04

## 2023-03-03 RX ORDER — MAGNESIUM HYDROXIDE 400 MG/1
30 TABLET, CHEWABLE ORAL EVERY 12 HOURS
Refills: 0 | Status: DISCONTINUED | OUTPATIENT
Start: 2023-03-03 | End: 2023-03-09

## 2023-03-03 RX ORDER — TRAMADOL HYDROCHLORIDE 50 MG/1
50 TABLET ORAL EVERY 6 HOURS
Refills: 0 | Status: DISCONTINUED | OUTPATIENT
Start: 2023-03-03 | End: 2023-03-09

## 2023-03-03 RX ORDER — PANTOPRAZOLE SODIUM 20 MG/1
40 TABLET, DELAYED RELEASE ORAL
Refills: 0 | Status: DISCONTINUED | OUTPATIENT
Start: 2023-03-03 | End: 2023-03-09

## 2023-03-03 RX ORDER — SODIUM CHLORIDE 9 MG/ML
1000 INJECTION, SOLUTION INTRAVENOUS
Refills: 0 | Status: DISCONTINUED | OUTPATIENT
Start: 2023-03-03 | End: 2023-03-04

## 2023-03-03 RX ORDER — SENNA PLUS 8.6 MG/1
2 TABLET ORAL AT BEDTIME
Refills: 0 | Status: DISCONTINUED | OUTPATIENT
Start: 2023-03-03 | End: 2023-03-09

## 2023-03-03 RX ORDER — CYCLOBENZAPRINE HYDROCHLORIDE 10 MG/1
10 TABLET, FILM COATED ORAL EVERY 8 HOURS
Refills: 0 | Status: DISCONTINUED | OUTPATIENT
Start: 2023-03-03 | End: 2023-03-09

## 2023-03-03 RX ORDER — SODIUM CHLORIDE 9 MG/ML
1000 INJECTION, SOLUTION INTRAVENOUS
Refills: 0 | Status: DISCONTINUED | OUTPATIENT
Start: 2023-03-03 | End: 2023-03-03

## 2023-03-03 RX ORDER — ONDANSETRON 8 MG/1
4 TABLET, FILM COATED ORAL EVERY 6 HOURS
Refills: 0 | Status: DISCONTINUED | OUTPATIENT
Start: 2023-03-03 | End: 2023-03-09

## 2023-03-03 RX ORDER — HYDROMORPHONE HYDROCHLORIDE 2 MG/ML
0.5 INJECTION INTRAMUSCULAR; INTRAVENOUS; SUBCUTANEOUS EVERY 4 HOURS
Refills: 0 | Status: DISCONTINUED | OUTPATIENT
Start: 2023-03-03 | End: 2023-03-09

## 2023-03-03 RX ORDER — OXYCODONE HYDROCHLORIDE 5 MG/1
5 TABLET ORAL EVERY 6 HOURS
Refills: 0 | Status: DISCONTINUED | OUTPATIENT
Start: 2023-03-03 | End: 2023-03-09

## 2023-03-03 RX ADMIN — Medication 650 MILLIGRAM(S): at 00:13

## 2023-03-03 RX ADMIN — FENTANYL CITRATE 25 MICROGRAM(S): 50 INJECTION INTRAVENOUS at 22:17

## 2023-03-03 RX ADMIN — SODIUM CHLORIDE 75 MILLILITER(S): 9 INJECTION, SOLUTION INTRAVENOUS at 22:17

## 2023-03-03 RX ADMIN — Medication 650 MILLIGRAM(S): at 13:00

## 2023-03-03 RX ADMIN — OXYCODONE HYDROCHLORIDE 5 MILLIGRAM(S): 5 TABLET ORAL at 15:00

## 2023-03-03 RX ADMIN — Medication 650 MILLIGRAM(S): at 06:12

## 2023-03-03 RX ADMIN — Medication 500 MILLIGRAM(S): at 06:13

## 2023-03-03 RX ADMIN — Medication 100 MILLIGRAM(S): at 06:13

## 2023-03-03 RX ADMIN — Medication 1 MILLIGRAM(S): at 13:59

## 2023-03-03 RX ADMIN — Medication 650 MILLIGRAM(S): at 12:57

## 2023-03-03 RX ADMIN — PANTOPRAZOLE SODIUM 40 MILLIGRAM(S): 20 TABLET, DELAYED RELEASE ORAL at 06:12

## 2023-03-03 RX ADMIN — Medication 650 MILLIGRAM(S): at 01:30

## 2023-03-03 RX ADMIN — CYCLOBENZAPRINE HYDROCHLORIDE 10 MILLIGRAM(S): 10 TABLET, FILM COATED ORAL at 18:15

## 2023-03-03 RX ADMIN — CYCLOBENZAPRINE HYDROCHLORIDE 10 MILLIGRAM(S): 10 TABLET, FILM COATED ORAL at 06:12

## 2023-03-03 RX ADMIN — Medication 100 MILLIGRAM(S): at 15:02

## 2023-03-03 RX ADMIN — HYDROMORPHONE HYDROCHLORIDE 0.5 MILLIGRAM(S): 2 INJECTION INTRAMUSCULAR; INTRAVENOUS; SUBCUTANEOUS at 23:40

## 2023-03-03 RX ADMIN — POLYETHYLENE GLYCOL 3350 17 GRAM(S): 17 POWDER, FOR SOLUTION ORAL at 13:59

## 2023-03-03 RX ADMIN — OXYCODONE HYDROCHLORIDE 5 MILLIGRAM(S): 5 TABLET ORAL at 14:58

## 2023-03-03 RX ADMIN — Medication 1 TABLET(S): at 13:57

## 2023-03-03 NOTE — PROGRESS NOTE ADULT - ATTENDING COMMENTS
pt. personally examined.  H&P reviewed and updated.  patient with continued neurologic weakness.  Lack of improvement with conservative measures.  Neurogenic claudication with spontaneous muscle spasms as well.  Goals of surgery are to decompress the spinal canal with moderate to severe stenosis.  All questions answered.  Informed consent obtained.  patient is aware she will likely need cervical spine surgery as well due to upper extremity symptoms as well.  We will make the cervical spine decision based on recovery from lumbar spine surgery.  All questions answered.  Informed consent obtained.
Above note reviewed. Agree with above.    The risks and benefits of operative versus nonoperative management were discussed with the patient in detail including but not limited to temporary or permanent vocal changes including vocal paralysis, temporary or permanent dysphagia, wound complications, infection, bleeding, persistent or new or worsening radicular symptoms, persistent or new neurologic deficit from nerve or spinal cord injury including paralysis, dural tear with spinal fluid leak, need for future surgery, tracheal injury, esophageal injury, risk of postoperative asphyxiation, risk of adjacent segment disease, failure of fusion, hardware failure, stroke, blindness, death and medical complications such as pneumonia or organ failure. Risks associated with anesthesia were discussed including but not limited to myocardial infarction, stroke and death. Questions were sought and answered satisfactorily. Risks and benefits of blood transfusion were discussed including but was not limited to blood reaction and infection. After understanding risks, benefits, and alternatives to surgical management, informed consent was obtained.       Mac Barkley DO  Orthopedic and Spine Surgeon  [facility]

## 2023-03-03 NOTE — BRIEF OPERATIVE NOTE - NSICDXBRIEFPREOP_GEN_ALL_CORE_FT
PRE-OP DIAGNOSIS:  Lumbar stenosis 26-Feb-2023 13:28:11  Angela West  
PRE-OP DIAGNOSIS:  Cervical myelopathy 03-Mar-2023 22:28:50  Juanito Woods

## 2023-03-03 NOTE — BRIEF OPERATIVE NOTE - NSICDXBRIEFPOSTOP_GEN_ALL_CORE_FT
POST-OP DIAGNOSIS:  Cervical myelopathy 03-Mar-2023 22:28:57  Juanito Woods  
POST-OP DIAGNOSIS:  Lumbar stenosis 26-Feb-2023 13:28:15  Angela West

## 2023-03-03 NOTE — BRIEF OPERATIVE NOTE - NSICDXBRIEFPROCEDURE_GEN_ALL_CORE_FT
PROCEDURES:  Anterior cervical discectomy with fusion 2 levels 03-Mar-2023 22:28:15  Juanito Woods  
PROCEDURES:  Fusion of posterior lumbar spine 26-Feb-2023 13:28:59 L4-S1 Angela West

## 2023-03-03 NOTE — PROGRESS NOTE ADULT - ASSESSMENT
67 years old female with h/o sciatica pain, intermittent asthma admitted to [facility] for severe symptomatic spinal stenosis w/ bilateral foraminal stenosis. Associated with lower extremity numbness/weakness. sp L4-S1 Decompression with Posterior Spinal Fusion  2/26.       # severe lumbar spinal stenosis s/p laminectomy by Ortho 2/26--> KAREN drain still in place   # chronic low back pain  # right side sciatica   # mechanical fall  - trauma work up negative- CT head with no acute pathology. CT C spine with no fracture or malalignment. CT pelvis with no fracture or dislocation. CT T spine with no fracture. L spine noted multilevel degenerative changes. Moderate spinal canal stenosis at L5-S1, appears grossly unchanged from 12/7/2022  - MRI reviewed: Cervical spine: Disc bulges at C4-5 and C5-6 narrow the cervical canal and contact the spinal cord without cord abnormality.  - Lumbar spine: Advanced degenerative changes at L5-S1 severely narrow the thecal sac and compress the cauda equina nerves. Severe bilateral foraminal stenosis is also demonstrated L5-S1  --pain control prn, bowel regimen   --KAREN drain management per ortho   --DVT ppx per ortho   --PT :[facility]ILIO     3/3 plan for OR, ACDF C4-C6 today      - Constipation  + gas , no BM yet   continue with bowel regimen     intermittent asthma , not in exacerbation   c/w albuterol HFA prn , singulair qhs     Preventative measures   SCDs-dvt ppx  fall precautions

## 2023-03-03 NOTE — PROGRESS NOTE ADULT - SUBJECTIVE AND OBJECTIVE BOX
Patient seen and examined at bedside. Patient reports pain well controlled on medications. No acute events overnight. Pt denies fevers, chills, new onset numbness, weakness or tingling in the extremities.    T(C): 36.2 (03-03-23 @ 05:00), Max: 37.1 (03-02-23 @ 16:45)  T(F): 97.2 (03-03-23 @ 05:00), Max: 98.8 (03-02-23 @ 16:45)  HR: 72 (03-03-23 @ 05:00) (72 - 99)  BP: 103/71 (03-03-23 @ 05:00) (95/64 - 110/75)  RR: 18 (03-03-23 @ 05:00) (17 - 18)  SpO2: 96% (03-03-23 @ 05:00) (96% - 99%)    Exam:    General: NAD    Spine:    Dressings c/d/i   KAREN SS    Motor:                   C5                C6              C7               C8           T1   R            5/5                5/5            5/5             5/5          5/5  L             5/5               5/5             5/5             5/5          5/5                L2             L3             L4               L5            S1  R         5/5           5/5          5/5             5/5           5/5  L          5/5          5/5           5/5             5/5           5/5    Sensory:            C5         C6         C7      C8       T1        (0=absent, 1=impaired, 2=normal, NT=not testable)  R         2            2           2        1         2  L          2            2           2        2         2               L2          L3         L4      L5       S1         (0=absent, 1=impaired, 2=normal, NT=not testable)  R         1            2            2        2        2  L          2            2           2        2         2    UMNs:    Babinski (-) B/L  Clonus (-) B/L  Perez (-) B/L                          12.9   12.88 )-----------( 219      ( 01 Mar 2023 06:55 )             39.1   03-01    137  |  102  |  15  ----------------------------<  102<H>  4.3   |  28  |  0.60    Ca    8.9      01 Mar 2023 06:55    ASSESSMENT:  67F status-post L4-S1 Decompression with Posterior Spinal Fusion POD #5; Stable.     PLAN:  - Pain control.   - DVT Ppx: Hold in the setting of recent spinal surgery due to increased risk of epidural hematoma.   - Drain in place draining sero-sanguinous fluid; Monitor output.   - WBAT / OOB with assistance as needed.   - LSO for comfort  - PT/OT.   - Encourage incentive spirometry.   - DC planning: Home vs [facility] per PT recommendations.   - Will discuss with Dr. Ernst and will advise if plan changes. Patient seen and examined at bedside. Patient reports pain well controlled on medications. No acute events overnight. Pt denies fevers, chills, new onset numbness, weakness or tingling in the extremities.    T(C): 36.2 (03-03-23 @ 05:00), Max: 37.1 (03-02-23 @ 16:45)  T(F): 97.2 (03-03-23 @ 05:00), Max: 98.8 (03-02-23 @ 16:45)  HR: 72 (03-03-23 @ 05:00) (72 - 99)  BP: 103/71 (03-03-23 @ 05:00) (95/64 - 110/75)  RR: 18 (03-03-23 @ 05:00) (17 - 18)  SpO2: 96% (03-03-23 @ 05:00) (96% - 99%)    Exam:    General: NAD    Spine:    Dressings c/d/i   KAREN SS    Motor:                   C5                C6              C7               C8           T1   R            5/5                5/5            5/5             5/5          5/5  L             5/5               5/5             5/5             5/5          5/5                L2             L3             L4               L5            S1  R         5/5           5/5          5/5             5/5           5/5  L          5/5          5/5           5/5             5/5           5/5    Sensory:            C5         C6         C7      C8       T1        (0=absent, 1=impaired, 2=normal, NT=not testable)  R         2            2           2        1         2  L          2            2           2        2         2               L2          L3         L4      L5       S1         (0=absent, 1=impaired, 2=normal, NT=not testable)  R         1            2            2        2        2  L          2            2           2        2         2    UMNs:    Babinski (-) B/L  Clonus (-) B/L  Perez (-) B/L                          12.9   12.88 )-----------( 219      ( 01 Mar 2023 06:55 )             39.1   03-01    137  |  102  |  15  ----------------------------<  102<H>  4.3   |  28  |  0.60    Ca    8.9      01 Mar 2023 06:55    ASSESSMENT:  67F status-post L4-S1 Decompression with Posterior Spinal Fusion POD #5; Stable.     PLAN:  - OR Today 3/3 for ACDF C4-6  - NPO  - Pain control.   - DVT Ppx: Hold in the setting of recent spinal surgery due to increased risk of epidural hematoma.   - Drain in place draining sero-sanguinous fluid; Monitor output.   - WBAT / OOB with assistance as needed.   - LSO for comfort  - PT/OT.   - Encourage incentive spirometry.   - DC planning: Home vs [facility] per PT recommendations.   - Will discuss with Dr. Ernst and will advise if plan changes. Patient seen and examined at bedside. Patient reports pain well controlled on medications. No acute events overnight. Pt denies fevers, chills, or any other symptoms.    T(C): 36.2 (03-03-23 @ 05:00), Max: 37.1 (03-02-23 @ 16:45)  T(F): 97.2 (03-03-23 @ 05:00), Max: 98.8 (03-02-23 @ 16:45)  HR: 72 (03-03-23 @ 05:00) (72 - 99)  BP: 103/71 (03-03-23 @ 05:00) (95/64 - 110/75)  RR: 18 (03-03-23 @ 05:00) (17 - 18)  SpO2: 96% (03-03-23 @ 05:00) (96% - 99%)    Exam:    General: NAD    Spine:    Dressings c/d/i   KAREN SS    Motor:                   C5                C6              C7               C8           T1   R            5/5                5/5            5/5             5/5          5/5  L             5/5               5/5             5/5             5/5          5/5                L2             L3             L4               L5            S1  R         5/5           5/5          5/5             5/5           5/5  L          5/5          5/5           5/5             5/5           5/5    Sensory:            C5         C6         C7      C8       T1        (0=absent, 1=impaired, 2=normal, NT=not testable)  R         2            2           2        1         2  L          2            2           2        2         2               L2          L3         L4      L5       S1         (0=absent, 1=impaired, 2=normal, NT=not testable)  R         1            2            2        2        2  L          2            2           2        2         2    UMNs:    Babinski (-) B/L  Clonus (-) B/L  Perez (positive) B/L                          12.9   12.88 )-----------( 219      ( 01 Mar 2023 06:55 )             39.1   03-01    137  |  102  |  15  ----------------------------<  102<H>  4.3   |  28  |  0.60    Ca    8.9      01 Mar 2023 06:55    ASSESSMENT:  67F status-post L4-S1 Decompression with Posterior Spinal Fusion POD #5; Stable.     PLAN:  - OR Today 3/3 for ACDF C4-6  - NPO  - Pain control.   - DVT Ppx: Hold in the setting of recent spinal surgery due to increased risk of epidural hematoma.   - Drain in place draining sero-sanguinous fluid; Monitor output.   - WBAT / OOB with assistance as needed.   - LSO for comfort  - PT/OT.   - Encourage incentive spirometry.   - DC planning: Home vs [facility] per PT recommendations.   - Will discuss with Dr. Ernst and will advise if plan changes.

## 2023-03-03 NOTE — PRE-OP CHECKLIST - SELECT TESTS ORDERED
Patient seen in clinic for warfarin management due to DVT with an INR goal of 2.0-3.0. Estimated duration of therapy is indefinite. Patient states compliant all of the time with regimen. No bleeding or thromboembolic side effects noted. No significant med or dietary changes. No significant recent illness or disease state changes. PT/INR done in office per protocol. INR is 2.8 which is therapeutic. Warfarin regimen will be continued at current dose of 5mg Mon/Thurs/Sat, 10mg all other days. Will retest in 5 weeks. Patient understands dosing directions and information discussed. Dosing schedule and follow up appointment given to patient. Progress note routed to referring physicians office. Discussed with patient the Pharmacist Collaborative Practice Agreement. Patient provided verbal and/or electronic (ex. [email or web address]) consent to participate in the collaborative practice agreement between the pharmacist and referred patient. This is in lieu of paper consent due to COVID-19 precautions and the use of remote/virtual visits.        CLINICAL PHARMACY CONSULT: MED RECONCILIATION/REVIEW ADDENDUM    For Pharmacy Admin Tracking Only    PHSO: No  Total # of Interventions Recommended: 0  - Maintenance Safety Lab Monitoring #: 1  Total Interventions Accepted: 0  Time Spent (min): Eli Parra PharmD
Type and Screen/COVID-19
BMP/CBC/PT/PTT/INR/Type and Screen/COVID-19

## 2023-03-04 PROCEDURE — 99232 SBSQ HOSP IP/OBS MODERATE 35: CPT

## 2023-03-04 RX ORDER — CEFAZOLIN SODIUM 1 G
2000 VIAL (EA) INJECTION EVERY 8 HOURS
Refills: 0 | Status: DISCONTINUED | OUTPATIENT
Start: 2023-03-04 | End: 2023-03-04

## 2023-03-04 RX ORDER — DEXAMETHASONE 0.5 MG/5ML
8 ELIXIR ORAL EVERY 8 HOURS
Refills: 0 | Status: COMPLETED | OUTPATIENT
Start: 2023-03-04 | End: 2023-03-04

## 2023-03-04 RX ORDER — POLYETHYLENE GLYCOL 3350 17 G/17G
17 POWDER, FOR SOLUTION ORAL DAILY
Refills: 0 | Status: DISCONTINUED | OUTPATIENT
Start: 2023-03-04 | End: 2023-03-09

## 2023-03-04 RX ADMIN — HYDROMORPHONE HYDROCHLORIDE 0.5 MILLIGRAM(S): 2 INJECTION INTRAMUSCULAR; INTRAVENOUS; SUBCUTANEOUS at 22:05

## 2023-03-04 RX ADMIN — Medication 100 MILLIGRAM(S): at 02:02

## 2023-03-04 RX ADMIN — CYCLOBENZAPRINE HYDROCHLORIDE 10 MILLIGRAM(S): 10 TABLET, FILM COATED ORAL at 21:37

## 2023-03-04 RX ADMIN — Medication 101.6 MILLIGRAM(S): at 06:06

## 2023-03-04 RX ADMIN — HYDROMORPHONE HYDROCHLORIDE 0.5 MILLIGRAM(S): 2 INJECTION INTRAMUSCULAR; INTRAVENOUS; SUBCUTANEOUS at 00:26

## 2023-03-04 RX ADMIN — POLYETHYLENE GLYCOL 3350 17 GRAM(S): 17 POWDER, FOR SOLUTION ORAL at 14:25

## 2023-03-04 RX ADMIN — SENNA PLUS 2 TABLET(S): 8.6 TABLET ORAL at 21:37

## 2023-03-04 RX ADMIN — PANTOPRAZOLE SODIUM 40 MILLIGRAM(S): 20 TABLET, DELAYED RELEASE ORAL at 06:06

## 2023-03-04 RX ADMIN — HYDROMORPHONE HYDROCHLORIDE 0.5 MILLIGRAM(S): 2 INJECTION INTRAMUSCULAR; INTRAVENOUS; SUBCUTANEOUS at 18:35

## 2023-03-04 RX ADMIN — HYDROMORPHONE HYDROCHLORIDE 0.5 MILLIGRAM(S): 2 INJECTION INTRAMUSCULAR; INTRAVENOUS; SUBCUTANEOUS at 11:00

## 2023-03-04 RX ADMIN — CYCLOBENZAPRINE HYDROCHLORIDE 10 MILLIGRAM(S): 10 TABLET, FILM COATED ORAL at 14:25

## 2023-03-04 RX ADMIN — HYDROMORPHONE HYDROCHLORIDE 0.5 MILLIGRAM(S): 2 INJECTION INTRAMUSCULAR; INTRAVENOUS; SUBCUTANEOUS at 06:37

## 2023-03-04 RX ADMIN — HYDROMORPHONE HYDROCHLORIDE 0.5 MILLIGRAM(S): 2 INJECTION INTRAMUSCULAR; INTRAVENOUS; SUBCUTANEOUS at 18:16

## 2023-03-04 RX ADMIN — SODIUM CHLORIDE 75 MILLILITER(S): 9 INJECTION, SOLUTION INTRAVENOUS at 05:40

## 2023-03-04 RX ADMIN — Medication 101.6 MILLIGRAM(S): at 14:25

## 2023-03-04 RX ADMIN — Medication 101.6 MILLIGRAM(S): at 22:05

## 2023-03-04 RX ADMIN — HYDROMORPHONE HYDROCHLORIDE 0.5 MILLIGRAM(S): 2 INJECTION INTRAMUSCULAR; INTRAVENOUS; SUBCUTANEOUS at 11:20

## 2023-03-04 RX ADMIN — Medication 100 MILLIGRAM(S): at 05:41

## 2023-03-04 RX ADMIN — CYCLOBENZAPRINE HYDROCHLORIDE 10 MILLIGRAM(S): 10 TABLET, FILM COATED ORAL at 05:41

## 2023-03-04 RX ADMIN — MAGNESIUM HYDROXIDE 30 MILLILITER(S): 400 TABLET, CHEWABLE ORAL at 21:39

## 2023-03-04 RX ADMIN — HYDROMORPHONE HYDROCHLORIDE 0.5 MILLIGRAM(S): 2 INJECTION INTRAMUSCULAR; INTRAVENOUS; SUBCUTANEOUS at 22:20

## 2023-03-04 RX ADMIN — HYDROMORPHONE HYDROCHLORIDE 0.5 MILLIGRAM(S): 2 INJECTION INTRAMUSCULAR; INTRAVENOUS; SUBCUTANEOUS at 03:51

## 2023-03-04 NOTE — PHYSICAL THERAPY INITIAL EVALUATION ADULT - GAIT DEVIATIONS NOTED, PT EVAL
decreased naman/increased time in double stance/decreased weight-shifting ability
decreased naman/decreased step length
decreased naman/decreased step length/decreased weight-shifting ability
increased time in double stance/decreased step length/decreased stride length

## 2023-03-04 NOTE — PHYSICAL THERAPY INITIAL EVALUATION ADULT - IMPAIRED TRANSFERS: SIT/STAND, REHAB EVAL
Your current Orthopedic problem we are working together to treat is:  Follow up right total knee replacement     The suggested treatment that we have gone over today includes:    1. Continue to wear compression stockings during the day, okay to take off at night  2. Continue to take warfarin as instructed  3. Continue to use a walker for ambulation and continue to work with PT and OT  4. Continue to wean off oxycodone for pain  5. Okay to get incision wet in the shower, but pat dry after. No soaking and no lotions or creams  6. Call our office when you are going home and we will order home therapy.     It is recommended you schedule a follow-up appointment with Dr. Pillai in 1 month    Office hours are 8:00 am to 5:00 pm Monday through Friday    If it is urgent that you speak with someone outside of these hours, our [facility] will be able to assist you.    You can reach the office by [phone].     Thank you for choosing [facility] as your Orthopedic provider!     
impaired balance/decreased flexibility/pain/decreased ROM/decreased strength
pain/impaired postural control/decreased ROM/decreased strength
impaired balance/decreased flexibility/decreased strength

## 2023-03-04 NOTE — PHYSICAL THERAPY INITIAL EVALUATION ADULT - GAIT TRAINING, PT EVAL
Pt will be able to ambulate 30 feet using [RW] with sup in 4 to 6 weeks
Pt will independently ambulate 250 feet with RW without loss of balance, by 2- 3 weeks
Pt will improve ambulation to ~ 200 feet Independently using rolling walker within 3-4 weeks.
Pt will improve ambulation to ~ 200 feet Independently using rolling walker within 3-4 weeks.

## 2023-03-04 NOTE — PHYSICAL THERAPY INITIAL EVALUATION ADULT - REHAB POTENTIAL, PT EVAL
good, to achieve stated therapy goals
fair, will monitor progress closely

## 2023-03-04 NOTE — PHYSICAL THERAPY INITIAL EVALUATION ADULT - LEVEL OF INDEPENDENCE, REHAB EVAL
contact guard
maximum assist (25% patients effort)
maximum assist (25% patients effort)
contact guard

## 2023-03-04 NOTE — PROGRESS NOTE ADULT - SUBJECTIVE AND OBJECTIVE BOX
Patient is a 67y old  Female who presents with a chief complaint of fall, sciatica pain with RLE weakness/numbness (28 Feb 2023 06:00)      Patient seen and examined bedside.   no overnight events       REVIEW OF SYSTEMS: remaining ROS negative     MEDICATIONS  (STANDING):  acetaminophen     Tablet .. 650 milliGRAM(s) Oral every 6 hours  ascorbic acid 500 milliGRAM(s) Oral two times a day  cyclobenzaprine 10 milliGRAM(s) Oral every 8 hours  folic acid 1 milliGRAM(s) Oral daily  HYDROmorphone  Injectable 0.5 milliGRAM(s) IV Push once  influenza  Vaccine (HIGH DOSE) 0.7 milliLiter(s) IntraMuscular once  lactated ringers. 1000 milliLiter(s) (100 mL/Hr) IV Continuous <Continuous>  multivitamin 1 Tablet(s) Oral daily  pantoprazole    Tablet 40 milliGRAM(s) Oral before breakfast  polyethylene glycol 3350 17 Gram(s) Oral daily  senna 2 Tablet(s) Oral at bedtime    MEDICATIONS  (PRN):  bisacodyl 5 milliGRAM(s) Oral every 12 hours PRN Constipation  magnesium hydroxide Suspension 30 milliLiter(s) Oral every 12 hours PRN Constipation  ondansetron Injectable 4 milliGRAM(s) IV Push every 6 hours PRN Nausea and/or Vomiting  oxyCODONE    IR 2.5 milliGRAM(s) Oral every 4 hours PRN Moderate Pain (4 - 6)  oxyCODONE    IR 5 milliGRAM(s) Oral every 4 hours PRN Severe Pain (7 - 10)  traMADol 50 milliGRAM(s) Oral every 6 hours PRN Mild Pain (1 - 3)    Allergies    No Known Allergies    Intolerances        Vital Signs Last 24 Hrs  T(C): 36.7 (04 Mar 2023 12:44), Max: 37.2 (03 Mar 2023 21:55)  T(F): 98 (04 Mar 2023 12:44), Max: 99 (03 Mar 2023 21:55)  HR: 88 (04 Mar 2023 12:44) (75 - 100)  BP: 114/75 (04 Mar 2023 12:44) (106/72 - 148/88)  BP(mean): 106 (03 Mar 2023 22:40) (106 - 106)  RR: 18 (04 Mar 2023 12:44) (15 - 18)  SpO2: 98% (04 Mar 2023 12:44) (94% - 100%)    Parameters below as of 04 Mar 2023 12:44  Patient On (Oxygen Delivery Method): room air              PHYSICAL EXAM:  GENERAL: NAD,  no increased WOB   HEAD:  Atraumatic, Normocephalic  EYES: EOMI, PERRLA, conjunctiva and sclera clear  ENMT: No tonsillar erythema, exudates, or enlargement; Moist mucous membranes   NECK: Supple, No JVD   NERVOUS SYSTEM:  Alert & Oriented X3, Good concentration; nonfocal ; strength 4/5 throughout   CHEST/LUNG: CTAB  HEART: Regular rate and rhythm; No murmurs, rubs, or gallops  ABDOMEN: Soft, Nontender, Nondistended; Bowel sounds present  EXTREMITIES:  good pedal pulses b/l. no edema or calf tenderness b/l   Lines: Dick draing- serosanginous fluid in balloon    Labs                          12.5   12.47 )-----------( 255      ( 03 Mar 2023 22:05 )             37.1   03-03    134<L>  |  100  |  17  ----------------------------<  108<H>  4.7   |  25  |  0.63    Ca    8.8      03 Mar 2023 22:05          Consultant(s) Notes Reveiwed [x ] Yes     Care Discussed with [x ] Consultants  [x ] Patient  [ ] Family  [ x] /   [x ] Other; RN       Patient is a 67y old  Female who presents with a chief complaint of fall, sciatica pain with RLE weakness/numbness (28 Feb 2023 06:00)      Patient seen and examined bedside.   no overnight events       REVIEW OF SYSTEMS: remaining ROS negative     MEDICATIONS  (STANDING):  acetaminophen     Tablet .. 650 milliGRAM(s) Oral every 6 hours  ascorbic acid 500 milliGRAM(s) Oral two times a day  cyclobenzaprine 10 milliGRAM(s) Oral every 8 hours  folic acid 1 milliGRAM(s) Oral daily  HYDROmorphone  Injectable 0.5 milliGRAM(s) IV Push once  influenza  Vaccine (HIGH DOSE) 0.7 milliLiter(s) IntraMuscular once  lactated ringers. 1000 milliLiter(s) (100 mL/Hr) IV Continuous <Continuous>  multivitamin 1 Tablet(s) Oral daily  pantoprazole    Tablet 40 milliGRAM(s) Oral before breakfast  polyethylene glycol 3350 17 Gram(s) Oral daily  senna 2 Tablet(s) Oral at bedtime    MEDICATIONS  (PRN):  bisacodyl 5 milliGRAM(s) Oral every 12 hours PRN Constipation  magnesium hydroxide Suspension 30 milliLiter(s) Oral every 12 hours PRN Constipation  ondansetron Injectable 4 milliGRAM(s) IV Push every 6 hours PRN Nausea and/or Vomiting  oxyCODONE    IR 2.5 milliGRAM(s) Oral every 4 hours PRN Moderate Pain (4 - 6)  oxyCODONE    IR 5 milliGRAM(s) Oral every 4 hours PRN Severe Pain (7 - 10)  traMADol 50 milliGRAM(s) Oral every 6 hours PRN Mild Pain (1 - 3)    Allergies    No Known Allergies    Intolerances        Vital Signs Last 24 Hrs  T(C): 36.7 (04 Mar 2023 12:44), Max: 37.2 (03 Mar 2023 21:55)  T(F): 98 (04 Mar 2023 12:44), Max: 99 (03 Mar 2023 21:55)  HR: 88 (04 Mar 2023 12:44) (75 - 100)  BP: 114/75 (04 Mar 2023 12:44) (106/72 - 148/88)  BP(mean): 106 (03 Mar 2023 22:40) (106 - 106)  RR: 18 (04 Mar 2023 12:44) (15 - 18)  SpO2: 98% (04 Mar 2023 12:44) (94% - 100%)    Parameters below as of 04 Mar 2023 12:44  Patient On (Oxygen Delivery Method): room air              PHYSICAL EXAM:  GENERAL: NAD,  no increased WOB   HEAD:  Atraumatic, Normocephalic  EYES: EOMI, PERRLA, conjunctiva and sclera clear  ENMT: No tonsillar erythema, exudates, or enlargement; Moist mucous membranes   NECK: Supple, No JVD   NERVOUS SYSTEM:  Alert & Oriented X3, Good concentration; nonfocal ; strength 4/5 throughout   CHEST/LUNG: CTAB  HEART: Regular rate and rhythm; No murmurs, rubs, or gallops  ABDOMEN: Soft, Nontender, Nondistended; Bowel sounds present  EXTREMITIES:  good pedal pulses b/l. no edema or calf tenderness b/l     Labs                          12.5   12.47 )-----------( 255      ( 03 Mar 2023 22:05 )             37.1   03-03    134<L>  |  100  |  17  ----------------------------<  108<H>  4.7   |  25  |  0.63    Ca    8.8      03 Mar 2023 22:05          Consultant(s) Notes Reveiwed [x ] Yes     Care Discussed with [x ] Consultants  [x ] Patient  [ ] Family  [ x] /   [x ] Other; RN

## 2023-03-04 NOTE — PHYSICAL THERAPY INITIAL EVALUATION ADULT - DID THE PATIENT HAVE SURGERY?
s/p C4-C6 ACDF POD#01. S/P L4-S1 Decompression with Posterior Spinal Fusion on 2/21/yes
n/a
S/P L4-S1 Decompression with Posterior Spinal Fusion./yes
L4-S1 Decompression with Posterior Spinal Fusion./yes

## 2023-03-04 NOTE — PHYSICAL THERAPY INITIAL EVALUATION ADULT - BED MOBILITY LIMITATIONS, REHAB EVAL
decreased ability to use legs for bridging/pushing/impaired ability to control trunk for mobility
decreased ability to use arms for pushing/pulling/decreased ability to use legs for bridging/pushing/impaired ability to control trunk for mobility
decreased ability to use legs for bridging/pushing/impaired ability to control trunk for mobility

## 2023-03-04 NOTE — PHYSICAL THERAPY INITIAL EVALUATION ADULT - LEVEL OF INDEPENDENCE: GAIT, REHAB EVAL
supervision
maximum assist (25% patients effort)
minimum assist (75% patients effort)
maximum assist (25% patients effort)

## 2023-03-04 NOTE — PHYSICAL THERAPY INITIAL EVALUATION ADULT - IMPAIRMENTS CONTRIBUTING IMPAIRED BED MOBILITY, REHAB EVAL
pain/impaired postural control/decreased ROM/decreased strength
impaired balance/cognition/decreased flexibility/decreased strength
impaired balance/decreased flexibility/pain/decreased ROM/decreased strength

## 2023-03-04 NOTE — PROGRESS NOTE ADULT - SUBJECTIVE AND OBJECTIVE BOX
Postop Check    Patient tolerated the procedure well. Patient seen and examined at bedside. No acute complaints at this time. Pain well controlled. Denies weakness, numbness or tingling. Denies chest pain, shortness of breath, nausea or vomiting.     PE:  Vital Signs Last 24 Hrs  T(C): 36.6 (03-04-23 @ 01:40), Max: 37.3 (03-03-23 @ 11:40)  T(F): 97.8 (03-04-23 @ 01:40), Max: 99.1 (03-03-23 @ 11:40)  HR: 75 (03-04-23 @ 01:40) (75 - 100)  BP: 112/75 (03-04-23 @ 01:40) (100/68 - 148/88)  BP(mean): 106 (03-03-23 @ 22:40) (106 - 106)  RR: 18 (03-04-23 @ 01:40) (15 - 18)  SpO2: 96% (03-04-23 @ 01:40) (94% - 100%)    General: NAD, resting comforatbly in bed  Dressing C/D/I  Drains present  2+ radial pulses  2+ DP Pulses    Motor:                   C5                C6              C7               C8           T1   R             5/5                5/5            5/5              5/5          5/5  L             5/5                5/5            5/5              5/5          5/5                    L2                  L3             L4              L5            S1  R            5/5                5/5             5/5            5/5          5/5  L             5/5                5/5            5/5            5/5          5/5    Sensory:            C5         C6         C7      C8       T1        (0=absent, 1=impaired, 2=normal, NT=not testable)  R         2            2           2        2         2  L          2            2           2        2         2               L2          L3         L4      L5       S1         (0=absent, 1=impaired, 2=normal, NT=not testable)  R         2            2            2        2        2  L          2            2           2        2         2            PT/INR - ( 03 Mar 2023 07:30 )   PT: 11.7 sec;   INR: 0.98 ratio         PTT - ( 03 Mar 2023 07:30 )  PTT:29.6 sec    A/P:  67y f s/p C4-6 ACDF POD 1, LF-S1 PSF (2/26)  -PT/OT -WBAT  -Pain Control  -DVT ppx - scd's  -Continue perioperative abx x 24 hours  -FU AM Labs  -Rest, ice, compress and elevate the extremity as we needed  -Incentive Spirometry  -Medical management appreciated

## 2023-03-04 NOTE — PHYSICAL THERAPY INITIAL EVALUATION ADULT - LEVEL OF INDEPENDENCE: SIT/SUPINE, REHAB EVAL
contact guard
maximum assist (25% patients effort)
maximum assist (25% patients effort)
minimum assist (75% patients effort)

## 2023-03-04 NOTE — PHYSICAL THERAPY INITIAL EVALUATION ADULT - ADDITIONAL COMMENTS
As per pt, she lives alone in an apt building with no steps to enter or no steps inside the house, She was independent in all functional mobility with no AD, PTA, h/o multiple falls, no HHA, stated no assistive device available or used
As per pt, she lives alone in a house with no steps to enter or no steps inside the house, She was independent in all functional mobility with no AD, PTA, h/o multiple falls, no HHA, stated no assistive device available or used
As per pt, she lives alone in an apt  with no steps to enter or no steps inside the house, She was independent in all functional mobility with no AD, PTA, h/o multiple falls, no HHA, stated no assistive device available or used
As per pt, she lives alone in an apt  with no steps to enter or no steps inside the house, She was independent in all functional mobility with no AD, PTA, h/o multiple falls, no HHA, stated no assistive device available or used

## 2023-03-04 NOTE — PHYSICAL THERAPY INITIAL EVALUATION ADULT - RANGE OF MOTION EXAMINATION, REHAB EVAL
bilateral upper extremity ROM was WFL (within functional limits)/bilateral lower extremity ROM was WFL (within functional limits)
bilateral upper extremity ROM was WNL (within normal limits)/bilateral lower extremity was ROM was WNL (within normal limits)
neck/trunk: spinal precautions: no bending, no twisting and no lifting/bilateral upper extremity ROM was WFL (within functional limits)/bilateral lower extremity ROM was WFL (within functional limits)/deficits as listed below

## 2023-03-04 NOTE — PHYSICAL THERAPY INITIAL EVALUATION ADULT - IMPAIRMENTS CONTRIBUTING TO GAIT DEVIATIONS, PT EVAL
impaired balance/decreased strength
impaired balance/decreased flexibility/pain/decreased ROM/decreased strength
impaired balance/cognition/decreased flexibility/decreased strength
pain/impaired postural control/decreased ROM/decreased strength

## 2023-03-04 NOTE — OCCUPATIONAL THERAPY INITIAL EVALUATION ADULT - PERTINENT HX OF CURRENT PROBLEM, REHAB EVAL
7 years old female with h/o sciatica pain, intermittent asthma admitted to [facility] for severe symptomatic spinal stenosis w/ bilateral foraminal stenosis. Associated with lower extremity numbness/weakness. sp L4-S1 Decompression with Posterior Spinal Fusion  2/26. Pt is s/p C4-6 ACDF POD 1.

## 2023-03-04 NOTE — PHYSICAL THERAPY INITIAL EVALUATION ADULT - IMPAIRMENTS FOUND, PT EVAL
aerobic capacity/endurance/gait, locomotion, and balance/joint integrity and mobility/muscle strength/poor safety awareness/posture
aerobic capacity/endurance/gait, locomotion, and balance/integumentary integrity/joint integrity and mobility/muscle strength/neuromotor development and sensory integration/posture/ROM
aerobic capacity/endurance/gait, locomotion, and balance/muscle strength/neuromotor development and sensory integration/posture/ROM
aerobic capacity/endurance/gait, locomotion, and balance/muscle strength/ROM

## 2023-03-04 NOTE — PHYSICAL THERAPY INITIAL EVALUATION ADULT - PERTINENT HX OF CURRENT PROBLEM, REHAB EVAL
Patient is a 66 y/o female initially admitted to [facility] due to S/P fall, generalized weakness. Pt underwent S/P L4-S1 Decompression with Posterior Spinal Fusion on 2/26/2023.
Generalised weakness. s/p fall. Lumbar spinal stenosis, possible cervical myelopathy. No fractures or dislocations, As per ortho consult- WBAT in BLE
Generalised weakness. s/p fall. Lumbar spinal stenosis, possible cervical myelopathy. No fractures or dislocations, As per ortho consult- WBAT in BLE
s/p C4-C6 ACDF POD#01. S/P L4-S1 Decompression with Posterior Spinal Fusion on 2/21

## 2023-03-04 NOTE — PHYSICAL THERAPY INITIAL EVALUATION ADULT - WEIGHT-BEARING RESTRICTIONS: SIT/STAND, REHAB EVAL
weight-bearing as tolerated
full weight-bearing

## 2023-03-04 NOTE — PHYSICAL THERAPY INITIAL EVALUATION ADULT - ACTIVE RANGE OF MOTION EXAMINATION, REHAB EVAL
sarah. upper extremity Active ROM was WNL (within normal limits)/bilateral lower extremity Active ROM was WNL (within normal limits)
bilateral upper extremity Active ROM was WFL (within functional limits)/bilateral  lower extremity Active ROM was WFL (within functional limits)/deficits as listed below

## 2023-03-04 NOTE — PHYSICAL THERAPY INITIAL EVALUATION ADULT - BED MOBILITY TRAINING, PT EVAL
Pt will independently perform all aspects of bed mobility to help prevent pressure ulcers, by 1-2 weeks
Pt will improve bed mobility to Independent within 3-4 weeks.
Pt will be able to move in & out of bed by self independently in  4 to 6 weeks
Pt will improve bed mobility to Independent within 3-4 weeks.

## 2023-03-04 NOTE — PHYSICAL THERAPY INITIAL EVALUATION ADULT - TRANSFER SAFETY CONCERNS NOTED: SIT/STAND, REHAB EVAL
decreased balance during turns/decreased safety awareness/decreased proprioception/decreased weight-shifting ability
decreased balance during turns/decreased step length/decreased weight-shifting ability
decreased sequencing ability

## 2023-03-04 NOTE — PHYSICAL THERAPY INITIAL EVALUATION ADULT - MANUAL MUSCLE TESTING RESULTS, REHAB EVAL
B UE/LE 3/5/grossly assessed due to
LLE- 3/5, RLE_ 3-/5/grossly assessed due to
grossly 4/5
BLE grossly graded 3+/5/grossly assessed due to

## 2023-03-04 NOTE — PHYSICAL THERAPY INITIAL EVALUATION ADULT - LEVEL OF INDEPENDENCE: STAND/SIT, REHAB EVAL
minimum assist (75% patients effort)
maximum assist (25% patients effort)
supervision
maximum assist (25% patients effort)

## 2023-03-04 NOTE — PHYSICAL THERAPY INITIAL EVALUATION ADULT - TRANSFER TRAINING, PT EVAL
Pt will be able to perform sit to stand, stand pivot transfer using [RW] with sup in 4 to 6 weeks
Pt will improve transfers to Independent within 3-4 weeks.
Pt will improve transfers to Independent within 3-4 weeks.

## 2023-03-04 NOTE — PROGRESS NOTE ADULT - ASSESSMENT
67 years old female with h/o sciatica pain, intermittent asthma admitted to [facility] for severe symptomatic spinal stenosis w/ bilateral foraminal stenosis. Associated with lower extremity numbness/weakness. sp L4-S1 Decompression with Posterior Spinal Fusion  2/26.       # severe lumbar spinal stenosis s/p laminectomy by Ortho 2/26--> KAREN drain still in place   # C-spine stenosis s/p  s/p C4-6 ACDF 3/3   # chronic low back pain  # right side sciatica   # mechanical fall  - trauma work up negative- CT head with no acute pathology. CT C spine with no fracture or malalignment. CT pelvis with no fracture or dislocation. CT T spine with no fracture. L spine noted multilevel degenerative changes. Moderate spinal canal stenosis at L5-S1, appears grossly unchanged from 12/7/2022  - MRI reviewed: Cervical spine: Disc bulges at C4-5 and C5-6 narrow the cervical canal and contact the spinal cord without cord abnormality.  - Lumbar spine: Advanced degenerative changes at L5-S1 severely narrow the thecal sac and compress the cauda equina nerves. Severe bilateral foraminal stenosis is also demonstrated L5-S1  --pain control prn, bowel regimen   --KAREN drain management per ortho   --DVT ppx per ortho   -decadron x 3 doses   --PT :MAURILIO     - Constipation  + gas , no BM yet   continue with bowel regimen     intermittent asthma , not in exacerbation   c/w albuterol HFA prn , singulair qhs     Preventative measures   SCDs-dvt ppx  fall precautions          67 years old female with h/o sciatica pain, intermittent asthma admitted to [facility] for severe symptomatic spinal stenosis w/ bilateral foraminal stenosis. Associated with lower extremity numbness/weakness. sp L4-S1 Decompression with Posterior Spinal Fusion  2/26.       # severe lumbar spinal stenosis s/p laminectomy by Ortho 2/26  # C-spine stenosis s/p  s/p C4-6 ACDF 3/3   # chronic low back pain  # right side sciatica   # mechanical fall  - trauma work up negative- CT head with no acute pathology. CT C spine with no fracture or malalignment. CT pelvis with no fracture or dislocation. CT T spine with no fracture. L spine noted multilevel degenerative changes. Moderate spinal canal stenosis at L5-S1, appears grossly unchanged from 12/7/2022  - MRI reviewed: Cervical spine: Disc bulges at C4-5 and C5-6 narrow the cervical canal and contact the spinal cord without cord abnormality.  - Lumbar spine: Advanced degenerative changes at L5-S1 severely narrow the thecal sac and compress the cauda equina nerves. Severe bilateral foraminal stenosis is also demonstrated L5-S1  --pain control prn, bowel regimen   --KAREN drain removed by ortho   --DVT ppx per ortho   -decadron x 3 doses   --PT :MAURILIO     - Constipation  + gas , no BM yet   continue with bowel regimen   monitor     intermittent asthma , not in exacerbation   c/w albuterol HFA prn , singulair qhs     Preventative measures   SCDs-dvt ppx  fall precautions

## 2023-03-04 NOTE — OCCUPATIONAL THERAPY INITIAL EVALUATION ADULT - TRANSFER SAFETY CONCERNS NOTED: SIT/STAND, REHAB EVAL
decreased sequencing ability/decreased step length/inability to maintain weight-bearing restrictions w/o assist/decreased weight-shifting ability

## 2023-03-04 NOTE — PHYSICAL THERAPY INITIAL EVALUATION ADULT - GENERAL OBSERVATIONS, REHAB EVAL
Chart (EMR) reviewed. Pt seen for PT Re-evaluation. Chart reviewed, pt encountered on supine, C/o pain on neck. + Catheter intact.
Pt was received in supine, AxOX2, NAD, forgetful at times
Pt was received in supine, AxOX2, NAD, forgetful at times
Chart (EMR) reviewed. Pt seen for PT Re-evaluation. Received supine c HOB elevated, NAD. +heplock, +dressing c prema to back intact, +B/L SCD's donned, +LSO brace available.

## 2023-03-04 NOTE — PHYSICAL THERAPY INITIAL EVALUATION ADULT - CRITERIA FOR SKILLED THERAPEUTIC INTERVENTIONS
impairments found/anticipated equipment needs at discharge/anticipated discharge recommendation
impairments found/functional limitations in following categories/risk reduction/prevention
impairments found
subacute rehab/impairments found/functional limitations in following categories/risk reduction/prevention/rehab potential/therapy frequency/predicted duration of therapy intervention/anticipated discharge recommendation

## 2023-03-04 NOTE — PHYSICAL THERAPY INITIAL EVALUATION ADULT - PLANNED THERAPY INTERVENTIONS, PT EVAL
balance training/bed mobility training/gait training/strengthening/transfer training
balance training/bed mobility training/gait training/strengthening
balance training/bed mobility training/gait training/strengthening/transfer training
balance training/bed mobility training/gait training/strengthening/transfer training

## 2023-03-04 NOTE — OCCUPATIONAL THERAPY INITIAL EVALUATION ADULT - ADDITIONAL COMMENTS
As per pt, she lives alone in an apt  with no steps to enter or no steps inside the house, She was independent in all functional mobility with no AD, PTA, h/o multiple falls, no HHA, stated no assistive device available or used

## 2023-03-04 NOTE — PHYSICAL THERAPY INITIAL EVALUATION ADULT - ORIENTATION, REHAB EVAL
person/place
oriented to person, place, time and situation

## 2023-03-04 NOTE — PHYSICAL THERAPY INITIAL EVALUATION ADULT - GAIT DISTANCE, PT EVAL
3 feet sideways
40 ft
20 ft
4 side step to right, however right foot stance, pt exhibits a jerky movement in RLE<during push off in RLE also, but stated no c/o pain, just weakness, RN Richi made aware of this to be cautious when doing OOB transfers, may need Ax2 for ambulation bcoz of this RLE jerky movements

## 2023-03-04 NOTE — PHYSICAL THERAPY INITIAL EVALUATION ADULT - LEVEL OF INDEPENDENCE: SIT/STAND, REHAB EVAL
maximum assist (25% patients effort)
maximum assist (25% patients effort)
minimum assist (75% patients effort)
contact guard

## 2023-03-04 NOTE — OCCUPATIONAL THERAPY INITIAL EVALUATION ADULT - GENERAL OBSERVATIONS, REHAB EVAL
Pt was encountered supine in bed; NAD, s/p C4-C6 ACDF POD#01. S/P L4-S1 Decompression with Posterior Spinal Fusion on 2/21, spinal dressing clean, dry and intact, spinal precautions, primafit +, PIV +, BLE pneumatic pumps +, alert, cooperative, followed commands; pt c/o pain in lumbar spine which limits pt performance with functional ADL's/transfers and mobility; PT Will present.

## 2023-03-04 NOTE — PHYSICAL THERAPY INITIAL EVALUATION ADULT - DIAGNOSIS, PT EVAL
Difficulty in walking, Muscle weakness, Other reduced mobility
pain on neck
difficulty walking, decreased strength, decreased balance
Difficulty in walking, Muscle weakness, Other reduced mobility

## 2023-03-04 NOTE — PHYSICAL THERAPY INITIAL EVALUATION ADULT - LEVEL OF INDEPENDENCE: SUPINE/SIT, REHAB EVAL
minimum assist (75% patients effort)
maximum assist (25% patients effort)
maximum assist (25% patients effort)

## 2023-03-04 NOTE — PHYSICAL THERAPY INITIAL EVALUATION ADULT - STRENGTHENING, PT EVAL
Pt will increase muscle strength to 4+/5 to improve transfers and ambulation within 3-4 weeks.
Pt will increase muscle strength to 4+/5 to improve transfers and ambulation within 3-4 weeks.
Pt will improve muscle strength in all extremities to 4/5 in  to perform Gait  with sup in 4 to 6 weeks
Patient will improve strength by 1 grade in 6-8 weeks to improve overall functional mobility including gait, transfers, bed mobility and decrease risk of falls.

## 2023-03-04 NOTE — PHYSICAL THERAPY INITIAL EVALUATION ADULT - PRECAUTIONS/LIMITATIONS, REHAB EVAL
fall precautions/spinal precautions/surgical precautions
fall precautions/spinal precautions/surgical precautions
fall precautions
fall precautions/spinal precautions/surgical precautions

## 2023-03-04 NOTE — OCCUPATIONAL THERAPY INITIAL EVALUATION ADULT - PRECAUTIONS/LIMITATIONS, REHAB EVAL
Pt educated on their spinal precuations inlcuding no lifting greater then 8lbs, no twisting, and no bending. Patient verbalized understanding of these precautions. Patient maintained spinal precuations ( no lifting greater than 8lbs, no twisting, and no bending) throughout session./fall precautions/spinal precautions

## 2023-03-04 NOTE — PHYSICAL THERAPY INITIAL EVALUATION ADULT - BALANCE DISTURBANCE, IDENTIFIED IMPAIRMENT CONTRIBUTE, REHAB EVAL
decreased strength
pain/decreased ROM/decreased strength
pain/impaired postural control/decreased ROM/decreased strength

## 2023-03-04 NOTE — PHYSICAL THERAPY INITIAL EVALUATION ADULT - PATIENT/FAMILY/SIGNIFICANT OTHER GOALS STATEMENT, PT EVAL
Get better and go home with help of needed
To get stronger to go home
Get better and go home.
To get stronger to go home

## 2023-03-04 NOTE — PHYSICAL THERAPY INITIAL EVALUATION ADULT - BALANCE TRAINING, PT EVAL
Pt will improve standing balance(S/D) to G/G- to increase dynamic activities within 3-4 weeks.
Pt will improve static & dynamic standing balance to fair  using [Rolling walker] n to perform Gait with sup in 4 to 6 weeks
Independent sitting, transfers, standing and ambulation with good balance using appropriate assistive device and prevent falls.
Pt will improve standing balance(S/D) to G/G- to increase dynamic activities within 3-4 weeks.

## 2023-03-05 LAB
ANION GAP SERPL CALC-SCNC: 9 MMOL/L — SIGNIFICANT CHANGE UP (ref 5–17)
BASOPHILS # BLD AUTO: 0.02 K/UL — SIGNIFICANT CHANGE UP (ref 0–0.2)
BASOPHILS NFR BLD AUTO: 0.1 % — SIGNIFICANT CHANGE UP (ref 0–2)
BUN SERPL-MCNC: 17 MG/DL — SIGNIFICANT CHANGE UP (ref 7–23)
CALCIUM SERPL-MCNC: 9.8 MG/DL — SIGNIFICANT CHANGE UP (ref 8.5–10.1)
CHLORIDE SERPL-SCNC: 99 MMOL/L — SIGNIFICANT CHANGE UP (ref 96–108)
CO2 SERPL-SCNC: 28 MMOL/L — SIGNIFICANT CHANGE UP (ref 22–31)
CREAT SERPL-MCNC: 0.5 MG/DL — SIGNIFICANT CHANGE UP (ref 0.5–1.3)
EGFR: 103 ML/MIN/1.73M2 — SIGNIFICANT CHANGE UP
EOSINOPHIL # BLD AUTO: 0 K/UL — SIGNIFICANT CHANGE UP (ref 0–0.5)
EOSINOPHIL NFR BLD AUTO: 0 % — SIGNIFICANT CHANGE UP (ref 0–6)
GLUCOSE SERPL-MCNC: 113 MG/DL — HIGH (ref 70–99)
HCT VFR BLD CALC: 39.4 % — SIGNIFICANT CHANGE UP (ref 34.5–45)
HGB BLD-MCNC: 13 G/DL — SIGNIFICANT CHANGE UP (ref 11.5–15.5)
IMM GRANULOCYTES NFR BLD AUTO: 0.8 % — SIGNIFICANT CHANGE UP (ref 0–0.9)
LYMPHOCYTES # BLD AUTO: 1.5 K/UL — SIGNIFICANT CHANGE UP (ref 1–3.3)
LYMPHOCYTES # BLD AUTO: 8.7 % — LOW (ref 13–44)
MCHC RBC-ENTMCNC: 31.1 PG — SIGNIFICANT CHANGE UP (ref 27–34)
MCHC RBC-ENTMCNC: 33 G/DL — SIGNIFICANT CHANGE UP (ref 32–36)
MCV RBC AUTO: 94.3 FL — SIGNIFICANT CHANGE UP (ref 80–100)
MONOCYTES # BLD AUTO: 1.86 K/UL — HIGH (ref 0–0.9)
MONOCYTES NFR BLD AUTO: 10.7 % — SIGNIFICANT CHANGE UP (ref 2–14)
NEUTROPHILS # BLD AUTO: 13.82 K/UL — HIGH (ref 1.8–7.4)
NEUTROPHILS NFR BLD AUTO: 79.7 % — HIGH (ref 43–77)
NRBC # BLD: 0 /100 WBCS — SIGNIFICANT CHANGE UP (ref 0–0)
PLATELET # BLD AUTO: 339 K/UL — SIGNIFICANT CHANGE UP (ref 150–400)
POTASSIUM SERPL-MCNC: 4.5 MMOL/L — SIGNIFICANT CHANGE UP (ref 3.5–5.3)
POTASSIUM SERPL-SCNC: 4.5 MMOL/L — SIGNIFICANT CHANGE UP (ref 3.5–5.3)
RBC # BLD: 4.18 M/UL — SIGNIFICANT CHANGE UP (ref 3.8–5.2)
RBC # FLD: 12.6 % — SIGNIFICANT CHANGE UP (ref 10.3–14.5)
SODIUM SERPL-SCNC: 136 MMOL/L — SIGNIFICANT CHANGE UP (ref 135–145)
WBC # BLD: 17.34 K/UL — HIGH (ref 3.8–10.5)
WBC # FLD AUTO: 17.34 K/UL — HIGH (ref 3.8–10.5)

## 2023-03-05 PROCEDURE — 99232 SBSQ HOSP IP/OBS MODERATE 35: CPT

## 2023-03-05 RX ORDER — SOD SULF/SODIUM/NAHCO3/KCL/PEG
2000 SOLUTION, RECONSTITUTED, ORAL ORAL ONCE
Refills: 0 | Status: COMPLETED | OUTPATIENT
Start: 2023-03-05 | End: 2023-03-05

## 2023-03-05 RX ADMIN — PANTOPRAZOLE SODIUM 40 MILLIGRAM(S): 20 TABLET, DELAYED RELEASE ORAL at 07:57

## 2023-03-05 RX ADMIN — HYDROMORPHONE HYDROCHLORIDE 0.5 MILLIGRAM(S): 2 INJECTION INTRAMUSCULAR; INTRAVENOUS; SUBCUTANEOUS at 22:31

## 2023-03-05 RX ADMIN — SENNA PLUS 2 TABLET(S): 8.6 TABLET ORAL at 22:30

## 2023-03-05 RX ADMIN — OXYCODONE HYDROCHLORIDE 10 MILLIGRAM(S): 5 TABLET ORAL at 21:35

## 2023-03-05 RX ADMIN — OXYCODONE HYDROCHLORIDE 10 MILLIGRAM(S): 5 TABLET ORAL at 20:35

## 2023-03-05 RX ADMIN — CYCLOBENZAPRINE HYDROCHLORIDE 10 MILLIGRAM(S): 10 TABLET, FILM COATED ORAL at 06:03

## 2023-03-05 RX ADMIN — HYDROMORPHONE HYDROCHLORIDE 0.5 MILLIGRAM(S): 2 INJECTION INTRAMUSCULAR; INTRAVENOUS; SUBCUTANEOUS at 06:04

## 2023-03-05 RX ADMIN — HYDROMORPHONE HYDROCHLORIDE 0.5 MILLIGRAM(S): 2 INJECTION INTRAMUSCULAR; INTRAVENOUS; SUBCUTANEOUS at 06:20

## 2023-03-05 RX ADMIN — HYDROMORPHONE HYDROCHLORIDE 0.5 MILLIGRAM(S): 2 INJECTION INTRAMUSCULAR; INTRAVENOUS; SUBCUTANEOUS at 22:46

## 2023-03-05 RX ADMIN — Medication 2000 MILLILITER(S): at 17:31

## 2023-03-05 RX ADMIN — Medication 1 ENEMA: at 17:31

## 2023-03-05 RX ADMIN — CYCLOBENZAPRINE HYDROCHLORIDE 10 MILLIGRAM(S): 10 TABLET, FILM COATED ORAL at 22:30

## 2023-03-05 NOTE — PROGRESS NOTE ADULT - SUBJECTIVE AND OBJECTIVE BOX
Patient is a 67y old  Female who presents with a chief complaint of fall, sciatica pain with RLE weakness/numbness (28 Feb 2023 06:00)      Patient seen and examined bedside.   no overnight events       REVIEW OF SYSTEMS: remaining ROS negative     MEDICATIONS  (STANDING):  acetaminophen     Tablet .. 650 milliGRAM(s) Oral every 6 hours  ascorbic acid 500 milliGRAM(s) Oral two times a day  cyclobenzaprine 10 milliGRAM(s) Oral every 8 hours  folic acid 1 milliGRAM(s) Oral daily  HYDROmorphone  Injectable 0.5 milliGRAM(s) IV Push once  influenza  Vaccine (HIGH DOSE) 0.7 milliLiter(s) IntraMuscular once  lactated ringers. 1000 milliLiter(s) (100 mL/Hr) IV Continuous <Continuous>  multivitamin 1 Tablet(s) Oral daily  pantoprazole    Tablet 40 milliGRAM(s) Oral before breakfast  polyethylene glycol 3350 17 Gram(s) Oral daily  senna 2 Tablet(s) Oral at bedtime    MEDICATIONS  (PRN):  bisacodyl 5 milliGRAM(s) Oral every 12 hours PRN Constipation  magnesium hydroxide Suspension 30 milliLiter(s) Oral every 12 hours PRN Constipation  ondansetron Injectable 4 milliGRAM(s) IV Push every 6 hours PRN Nausea and/or Vomiting  oxyCODONE    IR 2.5 milliGRAM(s) Oral every 4 hours PRN Moderate Pain (4 - 6)  oxyCODONE    IR 5 milliGRAM(s) Oral every 4 hours PRN Severe Pain (7 - 10)  traMADol 50 milliGRAM(s) Oral every 6 hours PRN Mild Pain (1 - 3)    Allergies    No Known Allergies    Intolerances        Vital Signs Last 24 Hrs  T(C): 36.4 (05 Mar 2023 05:06), Max: 36.7 (04 Mar 2023 12:44)  T(F): 97.6 (05 Mar 2023 05:06), Max: 98 (04 Mar 2023 12:44)  HR: 74 (05 Mar 2023 05:06) (74 - 88)  BP: 124/81 (05 Mar 2023 05:06) (114/75 - 129/85)  BP(mean): --  RR: 18 (05 Mar 2023 05:06) (17 - 18)  SpO2: 98% (05 Mar 2023 05:06) (97% - 98%)    Parameters below as of 05 Mar 2023 05:06  Patient On (Oxygen Delivery Method): room air            PHYSICAL EXAM:  GENERAL: NAD,  no increased WOB   HEAD:  Atraumatic, Normocephalic  EYES: EOMI, PERRLA, conjunctiva and sclera clear  ENMT: No tonsillar erythema, exudates, or enlargement; Moist mucous membranes   NECK: Supple, No JVD   NERVOUS SYSTEM:  Alert & Oriented X3, Good concentration; nonfocal ; strength 4/5 throughout   CHEST/LUNG: CTAB  HEART: Regular rate and rhythm; No murmurs, rubs, or gallops  ABDOMEN: Soft, Nontender, Nondistended; Bowel sounds present  EXTREMITIES:  good pedal pulses b/l. no edema or calf tenderness b/l     Labs                          12.5   12.47 )-----------( 255      ( 03 Mar 2023 22:05 )             37.1   03-03    134<L>  |  100  |  17  ----------------------------<  108<H>  4.7   |  25  |  0.63    Ca    8.8      03 Mar 2023 22:05          Consultant(s) Notes Reveiwed [x ] Yes     Care Discussed with [x ] Consultants  [x ] Patient  [ ] Family  [ x] /   [x ] Other; RN

## 2023-03-05 NOTE — PROGRESS NOTE ADULT - ASSESSMENT
67 years old female with h/o sciatica pain, intermittent asthma admitted to [facility] for severe symptomatic spinal stenosis w/ bilateral foraminal stenosis. Associated with lower extremity numbness/weakness. sp L4-S1 Decompression with Posterior Spinal Fusion  2/26.       # severe lumbar spinal stenosis s/p laminectomy by Ortho 2/26  # C-spine stenosis s/p  s/p C4-6 ACDF 3/3   # chronic low back pain  # right side sciatica   # mechanical fall  - trauma work up negative- CT head with no acute pathology. CT C spine with no fracture or malalignment. CT pelvis with no fracture or dislocation. CT T spine with no fracture. L spine noted multilevel degenerative changes. Moderate spinal canal stenosis at L5-S1, appears grossly unchanged from 12/7/2022  - MRI reviewed: Cervical spine: Disc bulges at C4-5 and C5-6 narrow the cervical canal and contact the spinal cord without cord abnormality.  - Lumbar spine: Advanced degenerative changes at L5-S1 severely narrow the thecal sac and compress the cauda equina nerves. Severe bilateral foraminal stenosis is also demonstrated L5-S1  --pain control prn, bowel regimen   --KAREN drain removed by ortho   --DVT ppx per ortho   -decadron x 3 doses   --PT :MAURILIO     - Constipation  + gas , no BM yet   continue with bowel regimen   monitor     intermittent asthma , not in exacerbation   c/w albuterol HFA prn , singulair qhs     Preventative measures   SCDs-dvt ppx  fall precautions          67 years old female with h/o sciatica pain, intermittent asthma admitted to [facility] for severe symptomatic spinal stenosis w/ bilateral foraminal stenosis. Associated with lower extremity numbness/weakness. sp L4-S1 Decompression with Posterior Spinal Fusion  2/26.       # severe lumbar spinal stenosis s/p laminectomy by Ortho 2/26  # C-spine stenosis s/p  s/p C4-6 ACDF 3/3   # chronic low back pain  # right side sciatica   # mechanical fall  - trauma work up negative- CT head with no acute pathology. CT C spine with no fracture or malalignment. CT pelvis with no fracture or dislocation. CT T spine with no fracture. L spine noted multilevel degenerative changes. Moderate spinal canal stenosis at L5-S1, appears grossly unchanged from 12/7/2022  - MRI reviewed: Cervical spine: Disc bulges at C4-5 and C5-6 narrow the cervical canal and contact the spinal cord without cord abnormality.  - Lumbar spine: Advanced degenerative changes at L5-S1 severely narrow the thecal sac and compress the cauda equina nerves. Severe bilateral foraminal stenosis is also demonstrated L5-S1  --pain control prn, bowel regimen   --KAREN drain removed by ortho   --DVT ppx per ortho   -decadron x 3 doses   --PT :MAURILIO     - Constipation  + gas , no BM yet   3/5 enema x 1 , Golytely x 1  monitor     intermittent asthma , not in exacerbation   c/w albuterol HFA prn , singulair qhs     Preventative measures   SCDs-dvt ppx  fall precautions

## 2023-03-05 NOTE — PROGRESS NOTE ADULT - SUBJECTIVE AND OBJECTIVE BOX
Patient seen and examined at bedside. Patient reports pain well controlled on medications. No acute events overnight. Pt denies fevers, chills, new onset numbness, weakness or tingling in the extremities.    T(C): 36.4 (03-05-23 @ 05:06), Max: 36.7 (03-04-23 @ 09:40)  T(F): 97.6 (03-05-23 @ 05:06), Max: 98 (03-04-23 @ 09:40)  HR: 74 (03-05-23 @ 05:06) (74 - 88)  BP: 124/81 (03-05-23 @ 05:06) (114/75 - 134/87)  RR: 18 (03-05-23 @ 05:06) (17 - 18)  SpO2: 98% (03-05-23 @ 05:06) (97% - 98%)    Exam:    General: NAD    Spine:    Dressings c/d/i     Motor:                   C5                C6              C7               C8           T1   R            5/5                5/5            5/5             5/5          5/5  L             5/5               5/5             5/5             5/5          5/5                L2             L3             L4               L5            S1  R         5/5           5/5          5/5             5/5           5/5  L          5/5          5/5           5/5             5/5           5/5    Sensory:            C5         C6         C7      C8       T1        (0=absent, 1=impaired, 2=normal, NT=not testable)  R         2            2           2        2         2  L          2            2           2        2         2               L2          L3         L4      L5       S1         (0=absent, 1=impaired, 2=normal, NT=not testable)  R         2            2            2        2        2  L          2            2           2        2         2    UMNs:    Babinski (-) B/L  Clonus (-) B/L  Perez (-) B/L                          12.5   12.47 )-----------( 255      ( 03 Mar 2023 22:05 )             37.1   03-03    134<L>  |  100  |  17  ----------------------------<  108<H>  4.7   |  25  |  0.63    Ca    8.8      03 Mar 2023 22:05    A/P:  67y f s/p C4-6 ACDF POD 2, LF-S1 PSF (2/26)  -PT/OT -WBAT  -Pain Control  -DVT ppx - scd's  -FU AM Labs  -Rest, ice, compress and elevate the extremity as we needed  -Incentive Spirometry  -Medical management appreciated  -Dispo per PT

## 2023-03-06 LAB
ANION GAP SERPL CALC-SCNC: 2 MMOL/L — LOW (ref 5–17)
BASOPHILS # BLD AUTO: 0.04 K/UL — SIGNIFICANT CHANGE UP (ref 0–0.2)
BASOPHILS NFR BLD AUTO: 0.3 % — SIGNIFICANT CHANGE UP (ref 0–2)
BUN SERPL-MCNC: 15 MG/DL — SIGNIFICANT CHANGE UP (ref 7–23)
CALCIUM SERPL-MCNC: 9.1 MG/DL — SIGNIFICANT CHANGE UP (ref 8.5–10.1)
CHLORIDE SERPL-SCNC: 103 MMOL/L — SIGNIFICANT CHANGE UP (ref 96–108)
CO2 SERPL-SCNC: 31 MMOL/L — SIGNIFICANT CHANGE UP (ref 22–31)
CREAT SERPL-MCNC: 0.59 MG/DL — SIGNIFICANT CHANGE UP (ref 0.5–1.3)
EGFR: 99 ML/MIN/1.73M2 — SIGNIFICANT CHANGE UP
EOSINOPHIL # BLD AUTO: 0.1 K/UL — SIGNIFICANT CHANGE UP (ref 0–0.5)
EOSINOPHIL NFR BLD AUTO: 0.9 % — SIGNIFICANT CHANGE UP (ref 0–6)
FLUAV AG NPH QL: SIGNIFICANT CHANGE UP
FLUBV AG NPH QL: SIGNIFICANT CHANGE UP
GLUCOSE SERPL-MCNC: 97 MG/DL — SIGNIFICANT CHANGE UP (ref 70–99)
HCT VFR BLD CALC: 37 % — SIGNIFICANT CHANGE UP (ref 34.5–45)
HGB BLD-MCNC: 11.9 G/DL — SIGNIFICANT CHANGE UP (ref 11.5–15.5)
IMM GRANULOCYTES NFR BLD AUTO: 0.7 % — SIGNIFICANT CHANGE UP (ref 0–0.9)
LYMPHOCYTES # BLD AUTO: 26.4 % — SIGNIFICANT CHANGE UP (ref 13–44)
LYMPHOCYTES # BLD AUTO: 3.08 K/UL — SIGNIFICANT CHANGE UP (ref 1–3.3)
MCHC RBC-ENTMCNC: 30.7 PG — SIGNIFICANT CHANGE UP (ref 27–34)
MCHC RBC-ENTMCNC: 32.2 G/DL — SIGNIFICANT CHANGE UP (ref 32–36)
MCV RBC AUTO: 95.4 FL — SIGNIFICANT CHANGE UP (ref 80–100)
MONOCYTES # BLD AUTO: 1.96 K/UL — HIGH (ref 0–0.9)
MONOCYTES NFR BLD AUTO: 16.8 % — HIGH (ref 2–14)
NEUTROPHILS # BLD AUTO: 6.42 K/UL — SIGNIFICANT CHANGE UP (ref 1.8–7.4)
NEUTROPHILS NFR BLD AUTO: 54.9 % — SIGNIFICANT CHANGE UP (ref 43–77)
NRBC # BLD: 0 /100 WBCS — SIGNIFICANT CHANGE UP (ref 0–0)
PLATELET # BLD AUTO: 283 K/UL — SIGNIFICANT CHANGE UP (ref 150–400)
POTASSIUM SERPL-MCNC: 4.3 MMOL/L — SIGNIFICANT CHANGE UP (ref 3.5–5.3)
POTASSIUM SERPL-SCNC: 4.3 MMOL/L — SIGNIFICANT CHANGE UP (ref 3.5–5.3)
RBC # BLD: 3.88 M/UL — SIGNIFICANT CHANGE UP (ref 3.8–5.2)
RBC # FLD: 12.8 % — SIGNIFICANT CHANGE UP (ref 10.3–14.5)
SARS-COV-2 RNA SPEC QL NAA+PROBE: SIGNIFICANT CHANGE UP
SODIUM SERPL-SCNC: 136 MMOL/L — SIGNIFICANT CHANGE UP (ref 135–145)
WBC # BLD: 11.68 K/UL — HIGH (ref 3.8–10.5)
WBC # FLD AUTO: 11.68 K/UL — HIGH (ref 3.8–10.5)

## 2023-03-06 PROCEDURE — 99232 SBSQ HOSP IP/OBS MODERATE 35: CPT

## 2023-03-06 RX ORDER — ALBUTEROL 90 UG/1
2 AEROSOL, METERED ORAL EVERY 6 HOURS
Refills: 0 | Status: DISCONTINUED | OUTPATIENT
Start: 2023-03-06 | End: 2023-03-09

## 2023-03-06 RX ORDER — MONTELUKAST 4 MG/1
10 TABLET, CHEWABLE ORAL DAILY
Refills: 0 | Status: DISCONTINUED | OUTPATIENT
Start: 2023-03-06 | End: 2023-03-09

## 2023-03-06 RX ADMIN — CYCLOBENZAPRINE HYDROCHLORIDE 10 MILLIGRAM(S): 10 TABLET, FILM COATED ORAL at 14:59

## 2023-03-06 RX ADMIN — POLYETHYLENE GLYCOL 3350 17 GRAM(S): 17 POWDER, FOR SOLUTION ORAL at 11:31

## 2023-03-06 RX ADMIN — OXYCODONE HYDROCHLORIDE 10 MILLIGRAM(S): 5 TABLET ORAL at 20:53

## 2023-03-06 RX ADMIN — HYDROMORPHONE HYDROCHLORIDE 0.5 MILLIGRAM(S): 2 INJECTION INTRAMUSCULAR; INTRAVENOUS; SUBCUTANEOUS at 18:14

## 2023-03-06 RX ADMIN — PANTOPRAZOLE SODIUM 40 MILLIGRAM(S): 20 TABLET, DELAYED RELEASE ORAL at 08:07

## 2023-03-06 RX ADMIN — HYDROMORPHONE HYDROCHLORIDE 0.5 MILLIGRAM(S): 2 INJECTION INTRAMUSCULAR; INTRAVENOUS; SUBCUTANEOUS at 21:37

## 2023-03-06 RX ADMIN — HYDROMORPHONE HYDROCHLORIDE 0.5 MILLIGRAM(S): 2 INJECTION INTRAMUSCULAR; INTRAVENOUS; SUBCUTANEOUS at 06:15

## 2023-03-06 RX ADMIN — MONTELUKAST 10 MILLIGRAM(S): 4 TABLET, CHEWABLE ORAL at 14:59

## 2023-03-06 RX ADMIN — HYDROMORPHONE HYDROCHLORIDE 0.5 MILLIGRAM(S): 2 INJECTION INTRAMUSCULAR; INTRAVENOUS; SUBCUTANEOUS at 18:30

## 2023-03-06 RX ADMIN — CYCLOBENZAPRINE HYDROCHLORIDE 10 MILLIGRAM(S): 10 TABLET, FILM COATED ORAL at 06:03

## 2023-03-06 RX ADMIN — HYDROMORPHONE HYDROCHLORIDE 0.5 MILLIGRAM(S): 2 INJECTION INTRAMUSCULAR; INTRAVENOUS; SUBCUTANEOUS at 11:31

## 2023-03-06 RX ADMIN — OXYCODONE HYDROCHLORIDE 10 MILLIGRAM(S): 5 TABLET ORAL at 21:53

## 2023-03-06 RX ADMIN — HYDROMORPHONE HYDROCHLORIDE 0.5 MILLIGRAM(S): 2 INJECTION INTRAMUSCULAR; INTRAVENOUS; SUBCUTANEOUS at 06:02

## 2023-03-06 RX ADMIN — CYCLOBENZAPRINE HYDROCHLORIDE 10 MILLIGRAM(S): 10 TABLET, FILM COATED ORAL at 21:36

## 2023-03-06 RX ADMIN — HYDROMORPHONE HYDROCHLORIDE 0.5 MILLIGRAM(S): 2 INJECTION INTRAMUSCULAR; INTRAVENOUS; SUBCUTANEOUS at 11:50

## 2023-03-06 RX ADMIN — HYDROMORPHONE HYDROCHLORIDE 0.5 MILLIGRAM(S): 2 INJECTION INTRAMUSCULAR; INTRAVENOUS; SUBCUTANEOUS at 22:37

## 2023-03-06 RX ADMIN — SENNA PLUS 2 TABLET(S): 8.6 TABLET ORAL at 21:36

## 2023-03-06 NOTE — PROGRESS NOTE ADULT - SUBJECTIVE AND OBJECTIVE BOX
Patient seen and examined at bedside. Patient reports pain well controlled on medications. No acute events overnight. Pt denies fevers, chills, new onset numbness, weakness or tingling in the extremities.    T(C): 36.4 (03-06-23 @ 05:06), Max: 36.6 (03-05-23 @ 11:26)  T(F): 97.5 (03-06-23 @ 05:06), Max: 97.8 (03-05-23 @ 11:26)  HR: 72 (03-06-23 @ 05:06) (72 - 92)  BP: 102/69 (03-06-23 @ 05:06) (102/69 - 124/84)  RR: 18 (03-06-23 @ 05:06) (16 - 18)  SpO2: 97% (03-06-23 @ 05:06) (97% - 100%)    Exam:    General: NAD    Spine:    Dressings over cervical, lumbar incisions c/d/i     Motor:                   C5                C6              C7               C8           T1   R            5/5                5/5            5/5             5/5          5/5  L             5/5               5/5             5/5             5/5          5/5                L2             L3             L4               L5            S1  R         5/5           5/5          5/5             5/5           5/5  L          5/5          5/5           5/5             5/5           5/5    Sensory:            C5         C6         C7      C8       T1        (0=absent, 1=impaired, 2=normal, NT=not testable)  R         2            2           2        2         2  L          2            2           2        2         2               L2          L3         L4      L5       S1         (0=absent, 1=impaired, 2=normal, NT=not testable)  R         2            2            2        2        2  L          2            2           2        2         2    UMNs:    Babinski (-) B/L  Clonus (-) B/L  Perez (-) B/L                          11.9   11.68 )-----------( 283      ( 06 Mar 2023 05:42 )             37.0   03-05    136  |  99  |  17  ----------------------------<  113<H>  4.5   |  28  |  0.50    Ca    9.8      05 Mar 2023 07:47    A/P:  67y f s/p C4-6 ACDF POD 3, LF-S1 PSF (2/26)  -PT/OT -WBAT  -Pain Control  -DVT ppx - scd's  -FU AM Labs  -Rest, ice, compress and elevate the extremity as we needed  -Incentive Spirometry  -Medical management appreciated  -Dispo per PT

## 2023-03-06 NOTE — PROGRESS NOTE ADULT - ASSESSMENT
67 years old female with h/o sciatica pain, intermittent asthma admitted to [facility] for severe symptomatic spinal stenosis w/ bilateral foraminal stenosis. Associated with lower extremity numbness/weakness. sp L4-S1 Decompression with Posterior Spinal Fusion  2/26.       # severe lumbar spinal stenosis s/p laminectomy by Ortho 2/26  # C-spine stenosis s/p  s/p C4-6 ACDF 3/3   # chronic low back pain  # right side sciatica   # mechanical fall  - trauma work up negative- CT head with no acute pathology. CT C spine with no fracture or malalignment. CT pelvis with no fracture or dislocation. CT T spine with no fracture. L spine noted multilevel degenerative changes. Moderate spinal canal stenosis at L5-S1, appears grossly unchanged from 12/7/2022  - MRI reviewed: Cervical spine: Disc bulges at C4-5 and C5-6 narrow the cervical canal and contact the spinal cord without cord abnormality.  - Lumbar spine: Advanced degenerative changes at L5-S1 severely narrow the thecal sac and compress the cauda equina nerves. Severe bilateral foraminal stenosis is also demonstrated L5-S1  --pain control prn, bowel regimen   --KAREN drain removed by ortho   --DVT ppx per ortho   -s/p decadron x 3 doses   --PT :MAURILIO     - Constipation  + gas , no BM yet   3/5 enema x 1 , Golytely x 1  3/6 +++ large BMs    continue with bowel regimen     intermittent asthma , not in exacerbation   c/w albuterol HFA prn , singulair qhs     Preventative measures   SCDs-dvt ppx  fall precautions

## 2023-03-06 NOTE — PROGRESS NOTE ADULT - SUBJECTIVE AND OBJECTIVE BOX
Patient is a 67y old  Female who presents with a chief complaint of fall, sciatica pain with RLE weakness/numbness (28 Feb 2023 06:00)      Patient seen and examined bedside.   no overnight events       REVIEW OF SYSTEMS: remaining ROS negative     MEDICATIONS  (STANDING):  acetaminophen     Tablet .. 650 milliGRAM(s) Oral every 6 hours  ascorbic acid 500 milliGRAM(s) Oral two times a day  cyclobenzaprine 10 milliGRAM(s) Oral every 8 hours  folic acid 1 milliGRAM(s) Oral daily  HYDROmorphone  Injectable 0.5 milliGRAM(s) IV Push once  influenza  Vaccine (HIGH DOSE) 0.7 milliLiter(s) IntraMuscular once  lactated ringers. 1000 milliLiter(s) (100 mL/Hr) IV Continuous <Continuous>  multivitamin 1 Tablet(s) Oral daily  pantoprazole    Tablet 40 milliGRAM(s) Oral before breakfast  polyethylene glycol 3350 17 Gram(s) Oral daily  senna 2 Tablet(s) Oral at bedtime    MEDICATIONS  (PRN):  bisacodyl 5 milliGRAM(s) Oral every 12 hours PRN Constipation  magnesium hydroxide Suspension 30 milliLiter(s) Oral every 12 hours PRN Constipation  ondansetron Injectable 4 milliGRAM(s) IV Push every 6 hours PRN Nausea and/or Vomiting  oxyCODONE    IR 2.5 milliGRAM(s) Oral every 4 hours PRN Moderate Pain (4 - 6)  oxyCODONE    IR 5 milliGRAM(s) Oral every 4 hours PRN Severe Pain (7 - 10)  traMADol 50 milliGRAM(s) Oral every 6 hours PRN Mild Pain (1 - 3)    Allergies    No Known Allergies    Intolerances        Vital Signs Last 24 Hrs  T(C): 36.8 (06 Mar 2023 10:43), Max: 36.8 (06 Mar 2023 10:43)  T(F): 98.2 (06 Mar 2023 10:43), Max: 98.2 (06 Mar 2023 10:43)  HR: 90 (06 Mar 2023 10:43) (72 - 92)  BP: 99/66 (06 Mar 2023 10:43) (99/66 - 124/84)  BP(mean): --  RR: 18 (06 Mar 2023 10:43) (16 - 18)  SpO2: 98% (06 Mar 2023 10:43) (97% - 100%)    Parameters below as of 06 Mar 2023 10:43  Patient On (Oxygen Delivery Method): room air          PHYSICAL EXAM:  GENERAL: NAD,  no increased WOB   HEAD:  Atraumatic, Normocephalic  EYES: EOMI, PERRLA, conjunctiva and sclera clear  ENMT: No tonsillar erythema, exudates, or enlargement; Moist mucous membranes   NECK: Supple, No JVD   NERVOUS SYSTEM:  Alert & Oriented X3, Good concentration; nonfocal ; strength 4/5 throughout   CHEST/LUNG: CTAB  HEART: Regular rate and rhythm; No murmurs, rubs, or gallops  ABDOMEN: Soft, Nontender, Nondistended; Bowel sounds present  EXTREMITIES:  good pedal pulses b/l. no edema or calf tenderness b/l     Labs                          11.9   11.68 )-----------( 283      ( 06 Mar 2023 05:42 )             37.0   03-06    136  |  103  |  15  ----------------------------<  97  4.3   |  31  |  0.59    Ca    9.1      06 Mar 2023 05:42      Consultant(s) Notes Reveiwed [x ] Yes     Care Discussed with [x ] Consultants  [x ] Patient  [ ] Family  [ x] /   [x ] Other; RN

## 2023-03-07 PROCEDURE — 99232 SBSQ HOSP IP/OBS MODERATE 35: CPT

## 2023-03-07 RX ADMIN — HYDROMORPHONE HYDROCHLORIDE 0.5 MILLIGRAM(S): 2 INJECTION INTRAMUSCULAR; INTRAVENOUS; SUBCUTANEOUS at 18:06

## 2023-03-07 RX ADMIN — PANTOPRAZOLE SODIUM 40 MILLIGRAM(S): 20 TABLET, DELAYED RELEASE ORAL at 06:25

## 2023-03-07 RX ADMIN — HYDROMORPHONE HYDROCHLORIDE 0.5 MILLIGRAM(S): 2 INJECTION INTRAMUSCULAR; INTRAVENOUS; SUBCUTANEOUS at 18:31

## 2023-03-07 RX ADMIN — MONTELUKAST 10 MILLIGRAM(S): 4 TABLET, CHEWABLE ORAL at 11:41

## 2023-03-07 RX ADMIN — CYCLOBENZAPRINE HYDROCHLORIDE 10 MILLIGRAM(S): 10 TABLET, FILM COATED ORAL at 22:03

## 2023-03-07 RX ADMIN — CYCLOBENZAPRINE HYDROCHLORIDE 10 MILLIGRAM(S): 10 TABLET, FILM COATED ORAL at 05:19

## 2023-03-07 RX ADMIN — HYDROMORPHONE HYDROCHLORIDE 0.5 MILLIGRAM(S): 2 INJECTION INTRAMUSCULAR; INTRAVENOUS; SUBCUTANEOUS at 05:19

## 2023-03-07 RX ADMIN — HYDROMORPHONE HYDROCHLORIDE 0.5 MILLIGRAM(S): 2 INJECTION INTRAMUSCULAR; INTRAVENOUS; SUBCUTANEOUS at 22:03

## 2023-03-07 RX ADMIN — HYDROMORPHONE HYDROCHLORIDE 0.5 MILLIGRAM(S): 2 INJECTION INTRAMUSCULAR; INTRAVENOUS; SUBCUTANEOUS at 02:06

## 2023-03-07 RX ADMIN — HYDROMORPHONE HYDROCHLORIDE 0.5 MILLIGRAM(S): 2 INJECTION INTRAMUSCULAR; INTRAVENOUS; SUBCUTANEOUS at 22:18

## 2023-03-07 RX ADMIN — SENNA PLUS 2 TABLET(S): 8.6 TABLET ORAL at 22:03

## 2023-03-07 RX ADMIN — HYDROMORPHONE HYDROCHLORIDE 0.5 MILLIGRAM(S): 2 INJECTION INTRAMUSCULAR; INTRAVENOUS; SUBCUTANEOUS at 06:19

## 2023-03-07 RX ADMIN — HYDROMORPHONE HYDROCHLORIDE 0.5 MILLIGRAM(S): 2 INJECTION INTRAMUSCULAR; INTRAVENOUS; SUBCUTANEOUS at 03:06

## 2023-03-07 RX ADMIN — POLYETHYLENE GLYCOL 3350 17 GRAM(S): 17 POWDER, FOR SOLUTION ORAL at 11:42

## 2023-03-07 NOTE — PROGRESS NOTE ADULT - SUBJECTIVE AND OBJECTIVE BOX
Patient is a 67y old  Female who presents with a chief complaint of fall, sciatica pain with RLE weakness/numbness (28 Feb 2023 06:00)      Patient seen and examined bedside.   no overnight events       REVIEW OF SYSTEMS: remaining ROS negative     MEDICATIONS  (STANDING):  acetaminophen     Tablet .. 650 milliGRAM(s) Oral every 6 hours  ascorbic acid 500 milliGRAM(s) Oral two times a day  cyclobenzaprine 10 milliGRAM(s) Oral every 8 hours  folic acid 1 milliGRAM(s) Oral daily  HYDROmorphone  Injectable 0.5 milliGRAM(s) IV Push once  influenza  Vaccine (HIGH DOSE) 0.7 milliLiter(s) IntraMuscular once  lactated ringers. 1000 milliLiter(s) (100 mL/Hr) IV Continuous <Continuous>  multivitamin 1 Tablet(s) Oral daily  pantoprazole    Tablet 40 milliGRAM(s) Oral before breakfast  polyethylene glycol 3350 17 Gram(s) Oral daily  senna 2 Tablet(s) Oral at bedtime    MEDICATIONS  (PRN):  bisacodyl 5 milliGRAM(s) Oral every 12 hours PRN Constipation  magnesium hydroxide Suspension 30 milliLiter(s) Oral every 12 hours PRN Constipation  ondansetron Injectable 4 milliGRAM(s) IV Push every 6 hours PRN Nausea and/or Vomiting  oxyCODONE    IR 2.5 milliGRAM(s) Oral every 4 hours PRN Moderate Pain (4 - 6)  oxyCODONE    IR 5 milliGRAM(s) Oral every 4 hours PRN Severe Pain (7 - 10)  traMADol 50 milliGRAM(s) Oral every 6 hours PRN Mild Pain (1 - 3)    Allergies    No Known Allergies    Intolerances        Vital Signs Last 24 Hrs  T(C): 36.7 (07 Mar 2023 05:08), Max: 36.8 (06 Mar 2023 18:18)  T(F): 98.1 (07 Mar 2023 05:08), Max: 98.2 (06 Mar 2023 18:18)  HR: 91 (07 Mar 2023 05:08) (86 - 94)  BP: 111/75 (07 Mar 2023 05:08) (94/65 - 111/75)  BP(mean): --  RR: 18 (07 Mar 2023 05:08) (18 - 18)  SpO2: 95% (07 Mar 2023 05:08) (95% - 96%)    Parameters below as of 07 Mar 2023 05:08  Patient On (Oxygen Delivery Method): room air            PHYSICAL EXAM:  GENERAL: NAD,  no increased WOB   HEAD:  Atraumatic, Normocephalic  EYES: EOMI, PERRLA, conjunctiva and sclera clear  ENMT: No tonsillar erythema, exudates, or enlargement; Moist mucous membranes   NECK: Supple, No JVD   NERVOUS SYSTEM:  Alert & Oriented X3, Good concentration; nonfocal ; strength 4/5 throughout   CHEST/LUNG: CTAB  HEART: Regular rate and rhythm; No murmurs, rubs, or gallops  ABDOMEN: Soft, Nontender, Nondistended; Bowel sounds present  EXTREMITIES:  good pedal pulses b/l. no edema or calf tenderness b/l     Labs                                     11.9   11.68 )-----------( 283      ( 06 Mar 2023 05:42 )             37.0   03-06    136  |  103  |  15  ----------------------------<  97  4.3   |  31  |  0.59    Ca    9.1      06 Mar 2023 05:42          Consultant(s) Notes Reveiwed [x ] Yes     Care Discussed with [x ] Consultants  [x ] Patient  [ ] Family  [ x] /   [x ] Other; RN

## 2023-03-07 NOTE — PROGRESS NOTE ADULT - SUBJECTIVE AND OBJECTIVE BOX
Patient seen and examined at bedside. Patient reports pain well controlled on medications. No acute events overnight. Pt denies fevers, chills, new onset numbness, weakness or tingling in the extremities. Pt reported some dizziness when supine earlier today, which resolved.    T(C): 36.7 (03-07-23 @ 05:08), Max: 36.8 (03-06-23 @ 10:43)  T(F): 98.1 (03-07-23 @ 05:08), Max: 98.2 (03-06-23 @ 10:43)  HR: 91 (03-07-23 @ 05:08) (86 - 94)  BP: 111/75 (03-07-23 @ 05:08) (94/65 - 111/75)  RR: 18 (03-07-23 @ 05:08) (18 - 18)  SpO2: 95% (03-07-23 @ 05:08) (95% - 98%)    Exam:    General: NAD    Spine:    Dressings c/d/i     Motor:                   C5                C6              C7               C8           T1   R            5/5                5/5            5/5             5/5          5/5  L             5/5               5/5             5/5             5/5          5/5                L2             L3             L4               L5            S1  R         5/5           5/5          5/5             5/5           5/5  L          5/5          5/5           5/5             5/5           5/5    Sensory:            C5         C6         C7      C8       T1        (0=absent, 1=impaired, 2=normal, NT=not testable)  R         2            2           2        2         2  L          2            2           2        2         2               L2          L3         L4      L5       S1         (0=absent, 1=impaired, 2=normal, NT=not testable)  R         2            2            2        2        2  L          2            2           2        2         2    UMNs:    Babinski (-) B/L  Clonus (-) B/L  Perez (-) B/L                          11.9   11.68 )-----------( 283      ( 06 Mar 2023 05:42 )             37.0   03-06    136  |  103  |  15  ----------------------------<  97  4.3   |  31  |  0.59    Ca    9.1      06 Mar 2023 05:42    A/P:  67y f s/p C4-6 ACDF POD 4, LF-S1 PSF (2/26)  -PT/OT -WBAT  -Pain Control  -DVT ppx - scd's  -FU AM Labs  -Rest, ice, compress and elevate the extremity as we needed  -Incentive Spirometry  -Medical management appreciated  -Dispo per PT

## 2023-03-07 NOTE — PROGRESS NOTE ADULT - ASSESSMENT
67 years old female with h/o sciatica pain, intermittent asthma admitted to [facility] for severe symptomatic spinal stenosis w/ bilateral foraminal stenosis. Associated with lower extremity numbness/weakness. sp L4-S1 Decompression with Posterior Spinal Fusion  2/26.       # severe lumbar spinal stenosis s/p laminectomy by Ortho 2/26  # C-spine stenosis s/p  s/p C4-6 ACDF 3/3   # chronic low back pain  # right side sciatica   # mechanical fall  - trauma work up negative- CT head with no acute pathology. CT C spine with no fracture or malalignment. CT pelvis with no fracture or dislocation. CT T spine with no fracture. L spine noted multilevel degenerative changes. Moderate spinal canal stenosis at L5-S1, appears grossly unchanged from 12/7/2022  - MRI reviewed: Cervical spine: Disc bulges at C4-5 and C5-6 narrow the cervical canal and contact the spinal cord without cord abnormality.  - Lumbar spine: Advanced degenerative changes at L5-S1 severely narrow the thecal sac and compress the cauda equina nerves. Severe bilateral foraminal stenosis is also demonstrated L5-S1  --pain control prn, bowel regimen   --KAREN drain removed by ortho   --DVT ppx per ortho   -s/p decadron x 3 doses   --PT : [facility]IO (awaiting auth)     - Constipation  + gas , no BM yet   3/5 enema x 1 , Golytely x 1  3/6 +++ large BMs    continue with bowel regimen     intermittent asthma , not in exacerbation   c/w albuterol HFA prn , singulair qhs     Preventative measures   SCDs-dvt ppx  fall precautions

## 2023-03-08 PROCEDURE — 99239 HOSP IP/OBS DSCHRG MGMT >30: CPT

## 2023-03-08 RX ORDER — MONTELUKAST 4 MG/1
1 TABLET, CHEWABLE ORAL
Qty: 0 | Refills: 0 | DISCHARGE
Start: 2023-03-08

## 2023-03-08 RX ORDER — POLYETHYLENE GLYCOL 3350 17 G/17G
17 POWDER, FOR SOLUTION ORAL
Qty: 0 | Refills: 0 | DISCHARGE
Start: 2023-03-08

## 2023-03-08 RX ORDER — OXYCODONE HYDROCHLORIDE 5 MG/1
1 TABLET ORAL
Qty: 0 | Refills: 0 | DISCHARGE
Start: 2023-03-08

## 2023-03-08 RX ORDER — SENNA PLUS 8.6 MG/1
2 TABLET ORAL
Qty: 0 | Refills: 0 | DISCHARGE
Start: 2023-03-08

## 2023-03-08 RX ORDER — METOPROLOL TARTRATE 50 MG
25 TABLET ORAL ONCE
Refills: 0 | Status: COMPLETED | OUTPATIENT
Start: 2023-03-08 | End: 2023-03-08

## 2023-03-08 RX ORDER — PANTOPRAZOLE SODIUM 20 MG/1
1 TABLET, DELAYED RELEASE ORAL
Qty: 0 | Refills: 0 | DISCHARGE
Start: 2023-03-08

## 2023-03-08 RX ORDER — CYCLOBENZAPRINE HYDROCHLORIDE 10 MG/1
1 TABLET, FILM COATED ORAL
Qty: 0 | Refills: 0 | DISCHARGE
Start: 2023-03-08

## 2023-03-08 RX ORDER — TRAMADOL HYDROCHLORIDE 50 MG/1
1 TABLET ORAL
Qty: 0 | Refills: 0 | DISCHARGE
Start: 2023-03-08

## 2023-03-08 RX ORDER — ALBUTEROL 90 UG/1
2 AEROSOL, METERED ORAL
Qty: 0 | Refills: 0 | DISCHARGE
Start: 2023-03-08

## 2023-03-08 RX ORDER — MAGNESIUM HYDROXIDE 400 MG/1
30 TABLET, CHEWABLE ORAL
Qty: 0 | Refills: 0 | DISCHARGE
Start: 2023-03-08

## 2023-03-08 RX ADMIN — HYDROMORPHONE HYDROCHLORIDE 0.5 MILLIGRAM(S): 2 INJECTION INTRAMUSCULAR; INTRAVENOUS; SUBCUTANEOUS at 05:52

## 2023-03-08 RX ADMIN — OXYCODONE HYDROCHLORIDE 10 MILLIGRAM(S): 5 TABLET ORAL at 17:23

## 2023-03-08 RX ADMIN — POLYETHYLENE GLYCOL 3350 17 GRAM(S): 17 POWDER, FOR SOLUTION ORAL at 11:21

## 2023-03-08 RX ADMIN — OXYCODONE HYDROCHLORIDE 10 MILLIGRAM(S): 5 TABLET ORAL at 18:20

## 2023-03-08 RX ADMIN — SENNA PLUS 2 TABLET(S): 8.6 TABLET ORAL at 22:16

## 2023-03-08 RX ADMIN — CYCLOBENZAPRINE HYDROCHLORIDE 10 MILLIGRAM(S): 10 TABLET, FILM COATED ORAL at 13:18

## 2023-03-08 RX ADMIN — Medication 25 MILLIGRAM(S): at 16:01

## 2023-03-08 RX ADMIN — OXYCODONE HYDROCHLORIDE 10 MILLIGRAM(S): 5 TABLET ORAL at 14:15

## 2023-03-08 RX ADMIN — OXYCODONE HYDROCHLORIDE 10 MILLIGRAM(S): 5 TABLET ORAL at 13:18

## 2023-03-08 RX ADMIN — OXYCODONE HYDROCHLORIDE 10 MILLIGRAM(S): 5 TABLET ORAL at 23:16

## 2023-03-08 RX ADMIN — PANTOPRAZOLE SODIUM 40 MILLIGRAM(S): 20 TABLET, DELAYED RELEASE ORAL at 07:45

## 2023-03-08 RX ADMIN — MONTELUKAST 10 MILLIGRAM(S): 4 TABLET, CHEWABLE ORAL at 11:21

## 2023-03-08 RX ADMIN — HYDROMORPHONE HYDROCHLORIDE 0.5 MILLIGRAM(S): 2 INJECTION INTRAMUSCULAR; INTRAVENOUS; SUBCUTANEOUS at 06:10

## 2023-03-08 RX ADMIN — CYCLOBENZAPRINE HYDROCHLORIDE 10 MILLIGRAM(S): 10 TABLET, FILM COATED ORAL at 22:16

## 2023-03-08 RX ADMIN — OXYCODONE HYDROCHLORIDE 10 MILLIGRAM(S): 5 TABLET ORAL at 22:16

## 2023-03-08 RX ADMIN — CYCLOBENZAPRINE HYDROCHLORIDE 10 MILLIGRAM(S): 10 TABLET, FILM COATED ORAL at 05:52

## 2023-03-08 NOTE — DISCHARGE NOTE NURSING/CASE MANAGEMENT/SOCIAL WORK - NSDCPEFALRISK_GEN_ALL_CORE
For information on Fall & Injury Prevention, visit: [email or web address] OR  [email or web address] OR  https://www.cdc.gov/steadi/patient.html

## 2023-03-08 NOTE — PROGRESS NOTE ADULT - SUBJECTIVE AND OBJECTIVE BOX
Patient seen and examined at bedside. Patient reports pain well controlled on medications. No acute events overnight. Pt denies fevers, chills, new onset numbness, weakness or tingling in the extremities.     T(C): 37.8 (03-08-23 @ 05:12), Max: 37.8 (03-08-23 @ 05:12)  T(F): 100.1 (03-08-23 @ 05:12), Max: 100.1 (03-08-23 @ 05:12)  HR: 107 (03-08-23 @ 05:12) (87 - 107)  BP: 135/78 (03-08-23 @ 05:12) (135/78 - 142/74)  RR: 18 (03-08-23 @ 05:12) (18 - 18)  SpO2: 95% (03-08-23 @ 05:12) (95% - 97%)    Exam:    General: NAD    Spine:    Dressings c/d/i     Motor:                   C5                C6              C7               C8           T1   R            5/5                5/5            5/5             5/5          5/5  L             5/5               5/5             5/5             5/5          5/5                L2             L3             L4               L5            S1  R         5/5           5/5          5/5             5/5           5/5  L          5/5          5/5           5/5             5/5           5/5    Sensory:            C5         C6         C7      C8       T1        (0=absent, 1=impaired, 2=normal, NT=not testable)  R         2            2           2        2         2  L          2            2           2        2         2               L2          L3         L4      L5       S1         (0=absent, 1=impaired, 2=normal, NT=not testable)  R         2            2            2        2        2  L          2            2           2        2         2    UMNs:    Babinski (-) B/L  Clonus (-) B/L  Perez (-) B/L    A/P:  67y f s/p C4-6 ACDF POD5, LF-S1 PSF (2/26)  -PT/OT -WBAT  -Pain Control  -DVT ppx - scd's  -FU AM Labs  -Rest, ice, compress and elevate the extremity as we needed  -Incentive Spirometry  -Medical management appreciated  -Dispo per PT

## 2023-03-08 NOTE — DISCHARGE NOTE NURSING/CASE MANAGEMENT/SOCIAL WORK - PATIENT PORTAL LINK FT
You can access the FollowMyHealth Patient Portal offered by [facility] by registering at the following website: [email or web address]. By joining [employer]’s FollowMyHealth portal, you will also be able to view your health information using other applications (apps) compatible with our system.

## 2023-03-08 NOTE — PROVIDER CONTACT NOTE (CHANGE IN STATUS NOTIFICATION) - SITUATION
Pt. s/p fusion of posterior lumbar spine L4-S1 on 2/26 and C4-C6 ACDF on 3/3. Pt. cleared for discharge. When transport came pt. tachycardic, . Transport waited almost 1 h. for HR to return to normal but maintained in 120's. MD Cash made aware. Metoprolol given. Discharge on hold until tomorrow.

## 2023-03-09 VITALS
TEMPERATURE: 100 F | DIASTOLIC BLOOD PRESSURE: 76 MMHG | OXYGEN SATURATION: 98 % | SYSTOLIC BLOOD PRESSURE: 105 MMHG | RESPIRATION RATE: 18 BRPM | HEART RATE: 105 BPM

## 2023-03-09 PROCEDURE — 99232 SBSQ HOSP IP/OBS MODERATE 35: CPT

## 2023-03-09 RX ORDER — HEPARIN SODIUM 5000 [USP'U]/ML
5000 INJECTION INTRAVENOUS; SUBCUTANEOUS EVERY 12 HOURS
Refills: 0 | Status: DISCONTINUED | OUTPATIENT
Start: 2023-03-09 | End: 2023-03-09

## 2023-03-09 RX ADMIN — PANTOPRAZOLE SODIUM 40 MILLIGRAM(S): 20 TABLET, DELAYED RELEASE ORAL at 07:46

## 2023-03-09 RX ADMIN — CYCLOBENZAPRINE HYDROCHLORIDE 10 MILLIGRAM(S): 10 TABLET, FILM COATED ORAL at 05:31

## 2023-03-09 RX ADMIN — OXYCODONE HYDROCHLORIDE 10 MILLIGRAM(S): 5 TABLET ORAL at 03:57

## 2023-03-09 RX ADMIN — OXYCODONE HYDROCHLORIDE 10 MILLIGRAM(S): 5 TABLET ORAL at 04:57

## 2023-03-09 RX ADMIN — MONTELUKAST 10 MILLIGRAM(S): 4 TABLET, CHEWABLE ORAL at 11:13

## 2023-03-09 RX ADMIN — CYCLOBENZAPRINE HYDROCHLORIDE 10 MILLIGRAM(S): 10 TABLET, FILM COATED ORAL at 13:42

## 2023-03-09 NOTE — PROGRESS NOTE ADULT - SUBJECTIVE AND OBJECTIVE BOX
Patient seen and examined at bedside. Patient reports pain well controlled on medications. No acute events overnight. Pt denies fevers, chills, new onset numbness, weakness or tingling in the extremities.    T(C): 36.3 (03-08-23 @ 23:14), Max: 37.4 (03-08-23 @ 11:08)  T(F): 97.3 (03-08-23 @ 23:14), Max: 99.4 (03-08-23 @ 11:08)  HR: 92 (03-09-23 @ 03:47) (84 - 125)  BP: 114/78 (03-09-23 @ 03:47) (93/62 - 121/75)  RR: 18 (03-09-23 @ 03:47) (17 - 19)  SpO2: 95% (03-09-23 @ 03:47) (95% - 99%)    Exam:    General: NAD    Spine:    No palpable step off. Nontender to palpation.     Motor:                   C5                C6              C7               C8           T1   R            5/5                5/5            5/5             5/5          5/5  L             5/5               5/5             5/5             5/5          5/5                L2             L3             L4               L5            S1  R         5/5           5/5          5/5             5/5           5/5  L          5/5          5/5           5/5             5/5           5/5    Sensory:            C5         C6         C7      C8       T1        (0=absent, 1=impaired, 2=normal, NT=not testable)  R         2            2           2        2         2  L          2            2           2        2         2               L2          L3         L4      L5       S1         (0=absent, 1=impaired, 2=normal, NT=not testable)  R         2            2            2        2        2  L          2            2           2        2         2    UMNs:    Babinski (-) B/L  Clonus (-) B/L  Perez (-) B/L    A/P:  67y f s/p C4-6 ACDF POD5, L4-S1 PSF (2/26)  -PT/OT -WBAT  -Pain Control  -DVT ppx - scd's  -FU AM Labs  -Rest, ice, compress and elevate the extremity as we needed  -Incentive Spirometry  -Medical management appreciated  -Dispo per PT: [facility]IO Patient seen and examined at bedside. Patient reports pain well controlled on medications. No acute events overnight. Pt denies fevers, chills, new onset numbness, weakness or tingling in the extremities.    T(C): 36.3 (03-08-23 @ 23:14), Max: 37.4 (03-08-23 @ 11:08)  T(F): 97.3 (03-08-23 @ 23:14), Max: 99.4 (03-08-23 @ 11:08)  HR: 92 (03-09-23 @ 03:47) (84 - 125)  BP: 114/78 (03-09-23 @ 03:47) (93/62 - 121/75)  RR: 18 (03-09-23 @ 03:47) (17 - 19)  SpO2: 95% (03-09-23 @ 03:47) (95% - 99%)    Exam:    General: NAD    Spine:    No palpable step off. Nontender to palpation.     Motor:                   C5                C6              C7               C8           T1   R            5/5                5/5            5/5             5/5          5/5  L             5/5               5/5             5/5             5/5          5/5                L2             L3             L4               L5            S1  R         5/5           5/5          5/5             5/5           5/5  L          5/5          5/5           5/5             5/5           5/5    Sensory:            C5         C6         C7      C8       T1        (0=absent, 1=impaired, 2=normal, NT=not testable)  R         2            2           2        2         2  L          2            2           2        2         2               L2          L3         L4      L5       S1         (0=absent, 1=impaired, 2=normal, NT=not testable)  R         2            2            2        2        2  L          2            2           2        2         2    UMNs:    Babinski (-) B/L  Clonus (-) B/L  Perez (-) B/L    A/P:  67y f s/p C4-6 ACDF POD6, L4-S1 PSF (2/26)  -PT/OT -WBAT  -Pain Control  -DVT ppx - scd's  -FU AM Labs  -Rest, ice, compress and elevate the extremity as we needed  -Incentive Spirometry  -Medical management appreciated  -Dispo per PT: MAURILIO

## 2023-03-09 NOTE — PROGRESS NOTE ADULT - REASON FOR ADMISSION
fall, sciatica pain with RLE weakness/numbness

## 2023-03-09 NOTE — PROGRESS NOTE ADULT - PROVIDER SPECIALTY LIST ADULT
Hospitalist
Internal Medicine
Internal Medicine
Orthopedics
Hospitalist
Hospitalist
Internal Medicine
Orthopedics
Internal Medicine
Orthopedics
Internal Medicine

## 2023-03-09 NOTE — PROGRESS NOTE ADULT - SUBJECTIVE AND OBJECTIVE BOX
Patient is a 67y old  Female who presents with a chief complaint of fall, sciatica pain with RLE weakness/numbness (09 Mar 2023 06:32)    INTERVAL HPI/OVERNIGHT EVENTS:    MEDICATIONS  (STANDING):  cyclobenzaprine 10 milliGRAM(s) Oral every 8 hours  heparin   Injectable 5000 Unit(s) SubCutaneous every 12 hours  HYDROmorphone  Injectable 0.5 milliGRAM(s) IV Push every 4 hours  influenza  Vaccine (HIGH DOSE) 0.7 milliLiter(s) IntraMuscular once  montelukast 10 milliGRAM(s) Oral daily  pantoprazole    Tablet 40 milliGRAM(s) Oral before breakfast  polyethylene glycol 3350 17 Gram(s) Oral daily  senna 2 Tablet(s) Oral at bedtime    MEDICATIONS  (PRN):  albuterol    90 MICROgram(s) HFA Inhaler 2 Puff(s) Inhalation every 6 hours PRN Shortness of Breath and/or Wheezing  bisacodyl 5 milliGRAM(s) Oral every 12 hours PRN Constipation  magnesium hydroxide Suspension 30 milliLiter(s) Oral every 12 hours PRN Constipation  ondansetron   Disintegrating Tablet 4 milliGRAM(s) Oral every 6 hours PRN Nausea and/or Vomiting  oxyCODONE    IR 10 milliGRAM(s) Oral every 4 hours PRN Severe Pain (7 - 10)  oxyCODONE    IR 5 milliGRAM(s) Oral every 6 hours PRN Moderate Pain (4 - 6)  traMADol 50 milliGRAM(s) Oral every 6 hours PRN Mild Pain (1 - 3)    Allergies    No Known Allergies    Intolerances      REVIEW OF SYSTEMS:  All other systems reviewed and are negative    Vital Signs Last 24 Hrs  T(C): 36.3 (08 Mar 2023 23:14), Max: 37.4 (08 Mar 2023 11:08)  T(F): 97.3 (08 Mar 2023 23:14), Max: 99.4 (08 Mar 2023 11:08)  HR: 92 (09 Mar 2023 03:47) (84 - 125)  BP: 114/78 (09 Mar 2023 03:47) (93/62 - 121/75)  BP(mean): 77 (08 Mar 2023 11:08) (77 - 77)  RR: 18 (09 Mar 2023 03:47) (17 - 19)  SpO2: 95% (09 Mar 2023 03:47) (95% - 99%)    Parameters below as of 09 Mar 2023 03:47  Patient On (Oxygen Delivery Method): room air      Daily     Daily   I&O's Summary    CAPILLARY BLOOD GLUCOSE        PHYSICAL EXAM:  GENERAL: NAD,    HEAD:  Atraumatic, Normocephalic  EYES: EOMI, PERRLA, conjunctiva and sclera clear  ENMT: No tonsillar erythema, exudates, or enlargement; Moist mucous membranes, Good dentition, No lesions  NECK: Supple, No JVD, Normal thyroid  NERVOUS SYSTEM:  Alert & Oriented X3, Good concentration; Motor Strength 5/5 B/L upper and lower extremities; DTRs 2+ intact and symmetric  CHEST/LUNG: Clear to percussion bilaterally; No rales, rhonchi, wheezing, or rubs  HEART: Regular rate and rhythm; No murmurs, rubs, or gallops  ABDOMEN: Soft, Nontender, Nondistended; Bowel sounds present  EXTREMITIES:  2+ Peripheral Pulses, No clubbing, cyanosis, or edema  LYMPH: No lymphadenopathy noted  SKIN: No rashes or lesions    Labs                                DVT prophylaxis: > Lovenox 40mg SQ daily  > Heparin   > SCD's

## 2023-03-09 NOTE — PROGRESS NOTE ADULT - ASSESSMENT
67 years old female with h/o sciatica pain, intermittent asthma admitted to [facility] for severe symptomatic spinal stenosis w/ bilateral foraminal stenosis. Associated with lower extremity numbness/weakness. sp L4-S1 Decompression with Posterior Spinal Fusion  2/26.       tachycardia yesterday when transport arrived to take pt to [facility]  pt was seen at bedside , mildly tachy 110-120, sinus no arythmia  suspect anxiety, low suspicion for dvt/pe, no hypoxia, no calf pain, pt stable  this morning patient at baseline will monitor , plan remains for jas today     # severe lumbar spinal stenosis s/p laminectomy by Ortho 2/26  # C-spine stenosis s/p  s/p C4-6 ACDF 3/3   # chronic low back pain  # right side sciatica   # mechanical fall  - trauma work up negative- CT head with no acute pathology. CT C spine with no fracture or malalignment. CT pelvis with no fracture or dislocation. CT T spine with no fracture. L spine noted multilevel degenerative changes. Moderate spinal canal stenosis at L5-S1, appears grossly unchanged from 12/7/2022  - MRI reviewed: Cervical spine: Disc bulges at C4-5 and C5-6 narrow the cervical canal and contact the spinal cord without cord abnormality.  - Lumbar spine: Advanced degenerative changes at L5-S1 severely narrow the thecal sac and compress the cauda equina nerves. Severe bilateral foraminal stenosis is also demonstrated L5-S1  --pain control prn, bowel regimen   --KAREN drain removed by ortho   --DVT ppx per ortho   -s/p decadron x 3 doses   --PT : [facility] (awaiting auth)     - Constipation  resolved     intermittent asthma , not in exacerbation   c/w albuterol HFA prn , singulair qhs     Preventative measures   SCDs-dvt ppx  fall precautions

## 2023-03-13 NOTE — ED ADULT TRIAGE NOTE - AS TEMP SITE
Pt arrives from home for evaluation of abd pain and bilateral flank pain that started this morning. Pt states pain has mostly resolved since she has arrives. Vomited once this morning. No pain with urination.   Pt states she fell yesterday from a sitting position but denies any obvious injuries from that. Has a hx of stroke in 8/22 with left sided deficits      Triage Assessment     Row Name 03/12/23 [id number]       Triage Assessment (Adult)    Airway WDL WDL       Respiratory WDL    Respiratory WDL WDL       Skin Circulation/Temperature WDL    Skin Circulation/Temperature WDL WDL       Cardiac WDL    Cardiac WDL WDL       Peripheral/Neurovascular WDL    Peripheral Neurovascular WDL WDL       Cognitive/Neuro/Behavioral WDL    Cognitive/Neuro/Behavioral WDL WDL               oral

## 2023-03-14 DIAGNOSIS — G83.4 CAUDA EQUINA SYNDROME: ICD-10-CM
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
## 2023-03-14 DIAGNOSIS — M43.17 SPONDYLOLISTHESIS, LUMBOSACRAL REGION: ICD-10-CM
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
## 2023-03-14 DIAGNOSIS — M25.78 OSTEOPHYTE, VERTEBRAE: ICD-10-CM

## 2023-03-14 DIAGNOSIS — K22.2 ESOPHAGEAL OBSTRUCTION: ICD-10-CM

## 2023-03-14 DIAGNOSIS — M48.061 SPINAL STENOSIS, LUMBAR REGION WITHOUT NEUROGENIC CLAUDICATION: ICD-10-CM

## 2023-03-14 DIAGNOSIS — M48.07 SPINAL STENOSIS, LUMBOSACRAL REGION: ICD-10-CM

## 2023-03-14 DIAGNOSIS — K59.00 CONSTIPATION, UNSPECIFIED: ICD-10-CM

## 2023-03-14 DIAGNOSIS — J45.20 MILD INTERMITTENT ASTHMA, UNCOMPLICATED: ICD-10-CM
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
## 2023-03-14 DIAGNOSIS — M43.16 SPONDYLOLISTHESIS, LUMBAR REGION: ICD-10-CM

## 2023-03-14 DIAGNOSIS — M50.021 CERVICAL DISC DISORDER AT C4-C5 LEVEL WITH MYELOPATHY: ICD-10-CM

## 2023-11-30 PROBLEM — K21.9 GASTRO-ESOPHAGEAL REFLUX DISEASE WITHOUT ESOPHAGITIS: Chronic | Status: ACTIVE | Noted: 2019-07-15

## 2023-11-30 PROBLEM — J45.909 UNSPECIFIED ASTHMA, UNCOMPLICATED: Chronic | Status: ACTIVE | Noted: 2019-07-15

## 2023-11-30 PROBLEM — I26.99 OTHER PULMONARY EMBOLISM WITHOUT ACUTE COR PULMONALE: Chronic | Status: ACTIVE | Noted: 2019-07-15

## 2024-03-12 NOTE — ED ADULT NURSE NOTE - DOES PATIENT HAVE ADVANCE DIRECTIVE
Call placed to patient.  LVM on identified line advising per LAWSON Calvin that \" I reviewed the MRI. There is nothing concerning seen on your scan. No stroke, mass, or bleed. There is some atrophy or \"shrinkage\" that we talked about in the office. And also mild microvascular changes, similar to plaque build up in the brain. Proceed with memory testing. \"            No

## 2024-05-08 ENCOUNTER — EMERGENCY (EMERGENCY)
Facility: HOSPITAL | Age: 69
LOS: 0 days | Discharge: HOME | End: 2024-05-09
Attending: EMERGENCY MEDICINE
Payer: MEDICARE

## 2024-05-08 VITALS
TEMPERATURE: 98 F | DIASTOLIC BLOOD PRESSURE: 108 MMHG | OXYGEN SATURATION: 100 % | HEIGHT: 60 IN | RESPIRATION RATE: 18 BRPM | SYSTOLIC BLOOD PRESSURE: 153 MMHG | HEART RATE: 79 BPM | WEIGHT: 139.99 LBS

## 2024-05-08 DIAGNOSIS — R20.0 ANESTHESIA OF SKIN: ICD-10-CM

## 2024-05-08 DIAGNOSIS — Z90.710 ACQUIRED ABSENCE OF BOTH CERVIX AND UTERUS: Chronic | ICD-10-CM

## 2024-05-08 DIAGNOSIS — R53.1 WEAKNESS: ICD-10-CM

## 2024-05-08 DIAGNOSIS — I10 ESSENTIAL (PRIMARY) HYPERTENSION: ICD-10-CM

## 2024-05-08 DIAGNOSIS — R26.81 UNSTEADINESS ON FEET: ICD-10-CM

## 2024-05-08 PROCEDURE — 99285 EMERGENCY DEPT VISIT HI MDM: CPT

## 2024-05-08 NOTE — ED ADULT TRIAGE NOTE - IDEAL BODY WEIGHT(KG)
Pt arrives awake and alert in NAD c/o 3 episodes of BRB per rectum since last night. Pt had a colonoscopy on Friday and was advised to abstain from ETOH. Pt had ETOH drinks last night just prior to the onset of bleeding.  Pt denies pain. States he was dizzy PTA but that has subsided.  
46

## 2024-05-08 NOTE — ED ADULT TRIAGE NOTE - ESI TRIAGE ACUITY LEVEL, MLM
3 [Appropriately responsive] : appropriately responsive [Alert] : alert [No Acute Distress] : no acute distress [No Lymphadenopathy] : no lymphadenopathy [Nipple  Discharge] : nipple discharge [Soft] : soft [Non-tender] : non-tender [Non-distended] : non-distended [No HSM] : No HSM [No Lesions] : no lesions [No Mass] : no mass [Oriented x3] : oriented x3 [Examination Of The Breasts] : a normal appearance [No Masses] : no breast masses were palpable [Labia Majora] : normal [Labia Minora] : normal [Normal] : normal [Uterine Adnexae] : normal [Tenderness] : nontender [Mass ___ cm] : no uterine mass was palpated [FreeTextEntry6] : slightly enlarged uterus on exam.

## 2024-05-08 NOTE — ED ADULT TRIAGE NOTE - CHIEF COMPLAINT QUOTE
complaining of pain on the back of the knee right leg started 1 week ago, pt state it comes and goes feels "heavy". as per EMS pt having this issue since 2 months denies trauma, taking muscle relaxers.

## 2024-05-09 VITALS
SYSTOLIC BLOOD PRESSURE: 140 MMHG | DIASTOLIC BLOOD PRESSURE: 90 MMHG | RESPIRATION RATE: 16 BRPM | HEART RATE: 74 BPM | OXYGEN SATURATION: 100 % | TEMPERATURE: 98 F

## 2024-05-09 PROBLEM — J45.909 UNSPECIFIED ASTHMA, UNCOMPLICATED: Chronic | Status: ACTIVE | Noted: 2023-02-11

## 2024-05-09 LAB
ALBUMIN SERPL ELPH-MCNC: 3.2 G/DL — LOW (ref 3.3–5)
ALP SERPL-CCNC: 91 U/L — SIGNIFICANT CHANGE UP (ref 40–120)
ALT FLD-CCNC: 12 U/L — SIGNIFICANT CHANGE UP (ref 12–78)
ANION GAP SERPL CALC-SCNC: 4 MMOL/L — LOW (ref 5–17)
AST SERPL-CCNC: 23 U/L — SIGNIFICANT CHANGE UP (ref 15–37)
BASOPHILS # BLD AUTO: 0.06 K/UL — SIGNIFICANT CHANGE UP (ref 0–0.2)
BASOPHILS NFR BLD AUTO: 0.7 % — SIGNIFICANT CHANGE UP (ref 0–2)
BILIRUB SERPL-MCNC: 0.4 MG/DL — SIGNIFICANT CHANGE UP (ref 0.2–1.2)
BLD GP AB SCN SERPL QL: SIGNIFICANT CHANGE UP
BUN SERPL-MCNC: 16 MG/DL — SIGNIFICANT CHANGE UP (ref 7–23)
CALCIUM SERPL-MCNC: 9.3 MG/DL — SIGNIFICANT CHANGE UP (ref 8.5–10.1)
CHLORIDE SERPL-SCNC: 109 MMOL/L — HIGH (ref 96–108)
CO2 SERPL-SCNC: 27 MMOL/L — SIGNIFICANT CHANGE UP (ref 22–31)
CREAT SERPL-MCNC: 0.66 MG/DL — SIGNIFICANT CHANGE UP (ref 0.5–1.3)
EGFR: 95 ML/MIN/1.73M2 — SIGNIFICANT CHANGE UP
EOSINOPHIL # BLD AUTO: 0.34 K/UL — SIGNIFICANT CHANGE UP (ref 0–0.5)
EOSINOPHIL NFR BLD AUTO: 4 % — SIGNIFICANT CHANGE UP (ref 0–6)
GLUCOSE SERPL-MCNC: 96 MG/DL — SIGNIFICANT CHANGE UP (ref 70–99)
HCT VFR BLD CALC: 40.7 % — SIGNIFICANT CHANGE UP (ref 34.5–45)
HGB BLD-MCNC: 13.5 G/DL — SIGNIFICANT CHANGE UP (ref 11.5–15.5)
IMM GRANULOCYTES NFR BLD AUTO: 0.1 % — SIGNIFICANT CHANGE UP (ref 0–0.9)
INR BLD: 0.97 RATIO — SIGNIFICANT CHANGE UP (ref 0.85–1.18)
LYMPHOCYTES # BLD AUTO: 2.89 K/UL — SIGNIFICANT CHANGE UP (ref 1–3.3)
LYMPHOCYTES # BLD AUTO: 33.8 % — SIGNIFICANT CHANGE UP (ref 13–44)
MCHC RBC-ENTMCNC: 30.8 PG — SIGNIFICANT CHANGE UP (ref 27–34)
MCHC RBC-ENTMCNC: 33.2 G/DL — SIGNIFICANT CHANGE UP (ref 32–36)
MCV RBC AUTO: 92.7 FL — SIGNIFICANT CHANGE UP (ref 80–100)
MONOCYTES # BLD AUTO: 0.72 K/UL — SIGNIFICANT CHANGE UP (ref 0–0.9)
MONOCYTES NFR BLD AUTO: 8.4 % — SIGNIFICANT CHANGE UP (ref 2–14)
NEUTROPHILS # BLD AUTO: 4.52 K/UL — SIGNIFICANT CHANGE UP (ref 1.8–7.4)
NEUTROPHILS NFR BLD AUTO: 53 % — SIGNIFICANT CHANGE UP (ref 43–77)
NRBC # BLD: 0 /100 WBCS — SIGNIFICANT CHANGE UP (ref 0–0)
PLATELET # BLD AUTO: 246 K/UL — SIGNIFICANT CHANGE UP (ref 150–400)
POTASSIUM SERPL-MCNC: 4.3 MMOL/L — SIGNIFICANT CHANGE UP (ref 3.5–5.3)
POTASSIUM SERPL-SCNC: 4.3 MMOL/L — SIGNIFICANT CHANGE UP (ref 3.5–5.3)
PROT SERPL-MCNC: 7.8 GM/DL — SIGNIFICANT CHANGE UP (ref 6–8.3)
PROTHROM AB SERPL-ACNC: 11.6 SEC — SIGNIFICANT CHANGE UP (ref 9.5–13)
RBC # BLD: 4.39 M/UL — SIGNIFICANT CHANGE UP (ref 3.8–5.2)
RBC # FLD: 13.2 % — SIGNIFICANT CHANGE UP (ref 10.3–14.5)
SODIUM SERPL-SCNC: 140 MMOL/L — SIGNIFICANT CHANGE UP (ref 135–145)
TROPONIN I, HIGH SENSITIVITY RESULT: 26.5 NG/L — SIGNIFICANT CHANGE UP
WBC # BLD: 8.54 K/UL — SIGNIFICANT CHANGE UP (ref 3.8–10.5)
WBC # FLD AUTO: 8.54 K/UL — SIGNIFICANT CHANGE UP (ref 3.8–10.5)

## 2024-05-09 PROCEDURE — 93926 LOWER EXTREMITY STUDY: CPT | Mod: 26,RT

## 2024-05-09 PROCEDURE — 93010 ELECTROCARDIOGRAM REPORT: CPT

## 2024-05-09 PROCEDURE — 72131 CT LUMBAR SPINE W/O DYE: CPT | Mod: 26,MC

## 2024-05-09 PROCEDURE — 70450 CT HEAD/BRAIN W/O DYE: CPT | Mod: 26,MC

## 2024-05-09 PROCEDURE — 93971 EXTREMITY STUDY: CPT | Mod: 26,RT

## 2024-05-09 PROCEDURE — 73562 X-RAY EXAM OF KNEE 3: CPT | Mod: 26,RT

## 2024-05-09 RX ORDER — ACETAMINOPHEN 500 MG
2 TABLET ORAL
Qty: 40 | Refills: 0
Start: 2024-05-09 | End: 2024-05-13

## 2024-05-09 RX ORDER — ACETAMINOPHEN 500 MG
975 TABLET ORAL ONCE
Refills: 0 | Status: COMPLETED | OUTPATIENT
Start: 2024-05-09 | End: 2024-05-09

## 2024-05-09 RX ADMIN — Medication 975 MILLIGRAM(S): at 02:33

## 2024-05-09 NOTE — ED PROVIDER NOTE - OBJECTIVE STATEMENT
67 yo F with 1-2 weeks of RLE weakness, numbness comes and goes.  Pt. denies inciting event/injury.  She feels like her RLE doesn't want to move, "like it's dead," compared to L side.  Pt. had back surgery before and was walking well after surgery with walker.  Now pt. can't event ambulate with walker.  No other complaints/associated symptoms. Pt. denies back pain, no saddle anesthesia or incontinence.    ROS: negative for fever, cough, headache, chest pain, shortness of breath, abd pain, nausea, vomiting, diarrhea, and rash--all other systems reviewed are negative.   PMH: cervical myelopathy, s/p Anterior cervical discectomy with fusion (3/23), lumbar stenosis s/p Fusion of posterior lumbar spine (2/23); Meds: See EMR for list; SH: Denies smoking/drinking/drug use

## 2024-05-09 NOTE — ED PROVIDER NOTE - CARE PROVIDER_API CALL
Choco Godwin  Neurology  [address] [place] [address]  Phone: [phone]  Fax: [phone]  Follow Up Time: Urgent    Alfred Connolly  Orthopaedic Surgery  [address] [place] [address]  Phone: [phone]  Fax: [phone]  Follow Up Time: Urgent   Choco Godwin  Neurology  [address] [place] [address]  Phone: [phone]  Fax: [phone]  Follow Up Time: Urgent    Alfred Connolly  Orthopaedic Surgery  [address] [place] [address]  Phone: [phone]  Fax: [phone]  Follow Up Time: Urgent    Fernando Dillon  Vascular Surgery  [address] [facility], [address]  [place] [address]  Phone: [phone]  Fax: [phone]  Follow Up Time: Urgent

## 2024-05-09 NOTE — ED ADULT NURSE NOTE - OBJECTIVE STATEMENT
Covering for primary RN Talia. Pt A&  complaining of pain on the back of the knee right leg started 1 week ago, pt state it comes and goes feels "heavy". as per EMS pt having this issue since 2 months denies trauma, taking muscle relaxers.  PMH PE, asthma, GERD. NKDA Covering for primary RN Talia. Pt A&Ox4, ambulatory, presents to ED c/o RLE weakness, numbness, and pain x 2 weeks. Denies any inciting event, fall, or injury. Pt denies other complaints. Pt denies cp, sob, f/c, n/v/d, incontinence. PMH PE, asthma, GERD. PSH cervical myelopathy, s/p Anterior cervical discectomy with fusion (3/23), lumbar stenosis s/p Fusion of posterior lumbar spine (2/23). VSS, nad noted.  NKDA

## 2024-05-09 NOTE — ED PROVIDER NOTE - PATIENT PORTAL LINK FT
You can access the FollowMyHealth Patient Portal offered by [facility] by registering at the following website: [email or web address]. By joining [employer]’s FollowMyHealth portal, you will also be able to view your health information using other applications (apps) compatible with our system.

## 2024-05-09 NOTE — ED PROVIDER NOTE - PROGRESS NOTE DETAILS
pt. witnessed getting up, going to bathroom with steady gait, no apparent pain, then walked back to bed without issue Results reported to patient--grossly benign, US shows no DVT, normal arterial flow, XR normal  Pt. reports feeling better after meds  pt. agrees to f/u with primary care outpt., vasc, ortho, neuro referrals given for f/u   pt. understands to return to ED if symptoms worsen; will d/c with results for reference, Tylenol prn

## 2024-05-09 NOTE — ED PROVIDER NOTE - PROVIDER TOKENS
PROVIDER:[TOKEN:[[id number]],FOLLOWUP:[Urgent]],PROVIDER:[TOKEN:[[id number]],FOLLOWUP:[Urgent]] PROVIDER:[TOKEN:[[id number]],FOLLOWUP:[Urgent]],PROVIDER:[TOKEN:[[id number]],FOLLOWUP:[Urgent]],PROVIDER:[TOKEN:[[id number]],FOLLOWUP:[Urgent]]

## 2024-05-09 NOTE — ED ADULT NURSE REASSESSMENT NOTE - NS ED NURSE REASSESS COMMENT FT1
Pt requested help to bathroom, requesting to walk. Pt assisted to bathroom by this RN, standby assist only. Pt ambulatory with steady gait. Awaiting results for dispo. Plan of care ongoing.

## 2024-05-09 NOTE — ED ADULT NURSE REASSESSMENT NOTE - NS ED NURSE REASSESS COMMENT FT1
Patient received from triage. Patient immediately taken to US. Unable to interview/assess patient. Will assess when pt returns.

## 2024-05-09 NOTE — ED ADULT NURSE NOTE - NSFALLHARMRISKINTERV_ED_ALL_ED

## 2024-05-09 NOTE — ED PROVIDER NOTE - CLINICAL SUMMARY MEDICAL DECISION MAKING FREE TEXT BOX
69 yo F with RLE weakness, numbness on and off, concerning for nerve compression, DVT, arterial insufficiency, doubt CVA, ACS unlikely   -cbc, cmp, coags, type and screen, RLE US for arterial and venous duplex, lumbar CT, brain CT, EKG, iv, monitor, tylenol for pain  -f/u results, reeval

## 2024-05-09 NOTE — ED ADULT NURSE NOTE - NS ED NOTE  TALK SOMEONE YN
Patient: Jani Pena    Procedure Summary     Date:  01/26/19 Room / Location:   [facility]    Anesthesia Start:  0804 Anesthesia Stop:      Procedure:  ESOPHAGOGASTRODUODENOSCOPY (N/A ) Diagnosis:       Nausea      Esophageal dysphagia      (Nausea [R11.0])      (Esophageal dysphagia [R13.10])    Surgeon:  Brunner, Mark I, MD Provider:      Anesthesia Type:  MAC ASA Status:  4          Anesthesia Type: MAC  Last vitals  BP   140/73 (01/25/19 2049)   Temp   98.1 °F (36.7 °C) (01/25/19 2049)   Pulse   61 (01/26/19 0600)   Resp   18 (01/25/19 2049)     SpO2   98 % (01/26/19 0600)     Post Anesthesia Care and Evaluation    Patient location during evaluation: PACU  Patient participation: complete - patient participated  Level of consciousness: awake and alert  Pain score: 0  Pain management: adequate  Airway patency: patent  Anesthetic complications: No anesthetic complications  PONV Status: none  Cardiovascular status: hemodynamically stable and acceptable  Respiratory status: nonlabored ventilation, acceptable and nasal cannula  Hydration status: acceptable       No

## 2024-05-09 NOTE — ED ADULT NURSE REASSESSMENT NOTE - NS ED NURSE REASSESS COMMENT FT1
Report received from Talia RN, care assumed at this time. Pt aaox4, resting on stretcher in NAD. Pt waiting to speak with a  in regards to increasing the amount of hours the visiting nurse comes to her residence. Pt states she needs more assistance with ADLs because she lives alone. Pt has no complaints at this time. Respirations even and unlabored. VSS as per flow sheet. Report received from Talia RN, care assumed at this time. Pt aaox4, resting on stretcher in NAD. Pt waiting to speak with a  in regards to increasing the amount of hours the visiting nurse comes to her residence. Pt states she needs more assistance with ADLs because she lives alone. Pt has no complaints at this time. Respirations even and unlabored. VSS as per flow sheet. Pt able to make all needs known. Comfort and safety maintained. Purposeful proactive rounding ongoing.

## 2024-05-09 NOTE — ED PROVIDER NOTE - CARE PROVIDERS DIRECT ADDRESSES
,DirectAddress_Unknown,DirectAddress_Unknown ,DirectAddress_Unknown,DirectAddress_Unknown,[email or web address]

## 2024-05-09 NOTE — ED PROVIDER NOTE - PHYSICAL EXAMINATION
Vitals: HTN at 153/108, otherwise WNL  Gen: AAOx3, NAD, sitting uncomfortably in stretcher, non-toxic   Head: ncat, perrla, eomi b/l  Neck: supple, no lymphadenopathy, no midline deviation  Heart: rrr, no m/r/g  Lungs: CTA b/l, no rales/ronchi/wheezes  Abd: soft, nontender, non-distended, no rebound or guarding  Ext: no clubbing/cyanosis/edema, RLE has decreased dorsalis pedis pulse to palpation, but normal cap refill/color/temp, no calf tenderness   Neuro: decreased strength of RLE in hip/knee extension

## 2024-06-07 ENCOUNTER — EMERGENCY (EMERGENCY)
Facility: HOSPITAL | Age: 69
LOS: 0 days | Discharge: HOME | End: 2024-06-07
Payer: MEDICARE

## 2024-06-07 VITALS
OXYGEN SATURATION: 100 % | DIASTOLIC BLOOD PRESSURE: 95 MMHG | RESPIRATION RATE: 17 BRPM | HEART RATE: 80 BPM | TEMPERATURE: 98 F | SYSTOLIC BLOOD PRESSURE: 168 MMHG

## 2024-06-07 VITALS
RESPIRATION RATE: 18 BRPM | WEIGHT: 145.95 LBS | SYSTOLIC BLOOD PRESSURE: 172 MMHG | TEMPERATURE: 98 F | HEIGHT: 60 IN | OXYGEN SATURATION: 98 % | HEART RATE: 80 BPM | DIASTOLIC BLOOD PRESSURE: 98 MMHG

## 2024-06-07 DIAGNOSIS — Z90.710 ACQUIRED ABSENCE OF BOTH CERVIX AND UTERUS: Chronic | ICD-10-CM

## 2024-06-07 DIAGNOSIS — M79.651 PAIN IN RIGHT THIGH: ICD-10-CM

## 2024-06-07 DIAGNOSIS — R20.2 PARESTHESIA OF SKIN: ICD-10-CM

## 2024-06-07 DIAGNOSIS — R29.898 OTHER SYMPTOMS AND SIGNS INVOLVING THE MUSCULOSKELETAL SYSTEM: ICD-10-CM

## 2024-06-07 DIAGNOSIS — R20.0 ANESTHESIA OF SKIN: ICD-10-CM

## 2024-06-07 DIAGNOSIS — J45.909 UNSPECIFIED ASTHMA, UNCOMPLICATED: ICD-10-CM

## 2024-06-07 PROCEDURE — 99284 EMERGENCY DEPT VISIT MOD MDM: CPT | Mod: FS

## 2024-06-07 RX ORDER — ACETAMINOPHEN 500 MG
650 TABLET ORAL ONCE
Refills: 0 | Status: COMPLETED | OUTPATIENT
Start: 2024-06-07 | End: 2024-06-07

## 2024-06-07 RX ORDER — LIDOCAINE 4 G/100G
1 CREAM TOPICAL ONCE
Refills: 0 | Status: COMPLETED | OUTPATIENT
Start: 2024-06-07 | End: 2024-06-07

## 2024-06-07 RX ADMIN — LIDOCAINE 1 PATCH: 4 CREAM TOPICAL at 19:14

## 2024-06-07 RX ADMIN — Medication 650 MILLIGRAM(S): at 19:13

## 2024-06-07 NOTE — ED ADULT TRIAGE NOTE - CHIEF COMPLAINT QUOTE
Patient returned to ED for R/ leg pain, patient reports she was in ED about a month ago with similar pain. Ortho f/u given.

## 2024-06-07 NOTE — ED PROVIDER NOTE - OBJECTIVE STATEMENT
68y Female with past medical history of asthma, back surgery presents to the ER for leg pain .Patient reports R thigh pain that radiates to bone and down leg, burning, 10/10, worse with standing. Difficult ambulating associated with numbness and tingling. Reports weakness to area. Seen in ER for similar symptoms 1 month ago, unsure when it got worse. Took meloxicam with minimal relief. Took gabapentin with minimal relief, follows up with neurology for symptoms. Denies falls. Denies headache, dizziness, chest pain, SOB, urinary symptoms, abdominal pain, n/v/d. No blood thinners. Lives alone, has an aid 6 hours a day 3p-9p. Usually ambulates with a walker, still can but it is more difficult.

## 2024-06-07 NOTE — ED ADULT NURSE NOTE - NSICDXPASTMEDICALHX_GEN_ALL_CORE_FT
PAST MEDICAL HISTORY:  Asthma     Asthma     GERD (gastroesophageal reflux disease)     Pulmonary embolism diagnosed June 12, 2019 at [facility] and started on Eliquis

## 2024-06-07 NOTE — ED PROVIDER NOTE - PATIENT PORTAL LINK FT
You can access the FollowMyHealth Patient Portal offered by [facility] by registering at the following website: [email or web address]. By joining [employer]’s FollowMyHealth portal, you will also be able to view your health information using other applications (apps) compatible with our system.

## 2024-06-07 NOTE — ED PROVIDER NOTE - PROGRESS NOTE DETAILS
CHANCE Pike: patient feeling better, would like to go home, will give pain management follow up and dc

## 2024-06-07 NOTE — ED PROVIDER NOTE - CLINICAL SUMMARY MEDICAL DECISION MAKING FREE TEXT BOX
68y Female with past medical history of asthma, back surgery presents to the ER for leg pain. Patient reports R thigh pain that radiates to bone and down leg, burning, 10/10, worse with standing. Difficult ambulating associated with numbness and tingling. Reports weakness to area. No acute changes in pain or recent falls, requesting pain control. Denies headache, dizziness, chest pain, SOB, urinary symptoms, abdominal pain, n/v/d. No blood thinners. Lives alone, has an aid 6 hours a day 3p-9p. Usually ambulates with a walker, still can but it is more difficult. Vital sign stable, no reproducible tenderness, dec ROM/strength secondary to pain, neurovascularly intact, no calf tenderness. Likely chronic neuropathic/osteoporosis pain, refusing additional imaging or labs, no signs of infection, wants pain meds and to dc, does not want rehab.

## 2024-06-07 NOTE — ED PROVIDER NOTE - NSFOLLOWUPINSTRUCTIONS_ED_ALL_ED_FT
Today you were seen in the ER for knee/leg pain.     Continue with your medications as previously prescribed.     Take Motrin 600 mg every 8 hours as needed for moderate pain -- take with food.    Take Tylenol 650mg (Two 325 mg pills) every 4-6 hours as needed for pain.    Rest, Ice, Elevate injured area    Return to Emergency room for any worsening or persistent pain, weakness, numbness, fever, color change to extremity, or any other concerning symptoms.    Advance activity as tolerated.     Continue all previously prescribed medications as directed unless otherwise instructed.     Follow up with your primary care physician, neurology and ortho in 48-72 hours- bring copies of your results.    Follow up with pain management - call [phone] to schedule an appointment

## 2024-06-07 NOTE — ED ADULT NURSE NOTE - NSFALLRISKINTERV_ED_ALL_ED

## 2024-06-07 NOTE — ED ADULT NURSE NOTE - OBJECTIVE STATEMENT
68 yr old female AOx4. C/o pain to R thigh. Seen in ED last month for similar pain. "Feels like my leg bone is burning." 10/10 pain, worse with weight bearing. Ambulates with a walker at baseline. Needs more assistance d/t pain. Denies further injury or trauma. Normal sensation and circulation. PMH of gerd and asthma. Pt denies CP, SOB, N/V/D, fever/chills, h/a, dizziness.

## 2024-07-05 NOTE — ED ADULT NURSE NOTE - IN ACCORDANCE WITH [place] STATE LAW, WE OFFER EVERY PATIENT A HEPATITIS C TEST. WOULD YOU LIKE TO BE TESTED TODAY?
Problem: Discharge Planning  Goal: Discharge to home or other facility with appropriate resources  7/5/2024 1152 by Macrina Lira LPN  Outcome: Progressing  Flowsheets (Taken 7/5/2024 0830)  Discharge to home or other facility with appropriate resources: Identify barriers to discharge with patient and caregiver  7/5/2024 0339 by Sameera Posadas RN  Outcome: Progressing  Flowsheets (Taken 7/5/2024 0339)  Discharge to home or other facility with appropriate resources:   Identify barriers to discharge with patient and caregiver   Identify discharge learning needs (meds, wound care, etc)   Arrange for interpreters to assist at discharge as needed   Arrange for needed discharge resources and transportation as appropriate     Problem: Pain  Goal: Verbalizes/displays adequate comfort level or baseline comfort level  7/5/2024 1152 by Macrina Lira LPN  Outcome: Progressing  Flowsheets (Taken 7/5/2024 0830)  Verbalizes/displays adequate comfort level or baseline comfort level:   Assess pain using appropriate pain scale   Encourage patient to monitor pain and request assistance  7/5/2024 0339 by Sameera Posadas RN  Outcome: Progressing  Flowsheets (Taken 7/5/2024 0339)  Verbalizes/displays adequate comfort level or baseline comfort level:   Encourage patient to monitor pain and request assistance   Assess pain using appropriate pain scale   Administer analgesics based on type and severity of pain and evaluate response   Implement non-pharmacological measures as appropriate and evaluate response     Problem: Safety - Adult  Goal: Free from fall injury  7/5/2024 1152 by Macrina Lira LPN  Outcome: Progressing  7/5/2024 0339 by Sameera Posadas RN  Outcome: Progressing  Flowsheets (Taken 7/5/2024 0339)  Free From Fall Injury: Instruct family/caregiver on patient safety     Problem: ABCDS Injury Assessment  Goal: Absence of physical injury  7/5/2024 1152 by Macrina Lira LPN  Outcome: Progressing  7/5/2024  Opt out

## 2024-10-10 NOTE — DISCHARGE NOTE NURSING/CASE MANAGEMENT/SOCIAL WORK - NSDCPETBCESMAN_GEN_ALL_CORE
Detail Level: Simple
Add 63256 Cpt? (Important Note: In 2017 The Use Of 97024 Is Being Tracked By Cms To Determine Future Global Period Reimbursement For Global Periods): yes
If you are a smoker, it is important for your health to stop smoking. Please be aware that second hand smoke is also harmful.

## 2024-11-03 ENCOUNTER — INPATIENT (INPATIENT)
Facility: HOSPITAL | Age: 69
LOS: 1 days | Discharge: HOME HEALTH | End: 2024-11-05
Attending: HOSPITALIST | Admitting: HOSPITALIST
Payer: MEDICARE

## 2024-11-03 VITALS
SYSTOLIC BLOOD PRESSURE: 121 MMHG | RESPIRATION RATE: 20 BRPM | TEMPERATURE: 98 F | OXYGEN SATURATION: 96 % | HEIGHT: 60 IN | DIASTOLIC BLOOD PRESSURE: 87 MMHG | HEART RATE: 88 BPM | WEIGHT: 134.04 LBS

## 2024-11-03 DIAGNOSIS — I26.99 OTHER PULMONARY EMBOLISM WITHOUT ACUTE COR PULMONALE: ICD-10-CM

## 2024-11-03 DIAGNOSIS — Z90.710 ACQUIRED ABSENCE OF BOTH CERVIX AND UTERUS: Chronic | ICD-10-CM

## 2024-11-03 LAB
ALBUMIN SERPL ELPH-MCNC: 3.3 G/DL — SIGNIFICANT CHANGE UP (ref 3.3–5)
ALP SERPL-CCNC: 88 U/L — SIGNIFICANT CHANGE UP (ref 40–120)
ALT FLD-CCNC: 38 U/L — SIGNIFICANT CHANGE UP (ref 12–78)
ANION GAP SERPL CALC-SCNC: 6 MMOL/L — SIGNIFICANT CHANGE UP (ref 5–17)
AST SERPL-CCNC: 28 U/L — SIGNIFICANT CHANGE UP (ref 15–37)
BASOPHILS # BLD AUTO: 0.07 K/UL — SIGNIFICANT CHANGE UP (ref 0–0.2)
BASOPHILS NFR BLD AUTO: 0.9 % — SIGNIFICANT CHANGE UP (ref 0–2)
BILIRUB SERPL-MCNC: 0.2 MG/DL — SIGNIFICANT CHANGE UP (ref 0.2–1.2)
BUN SERPL-MCNC: 15 MG/DL — SIGNIFICANT CHANGE UP (ref 7–23)
CALCIUM SERPL-MCNC: 8.6 MG/DL — SIGNIFICANT CHANGE UP (ref 8.5–10.1)
CHLORIDE SERPL-SCNC: 107 MMOL/L — SIGNIFICANT CHANGE UP (ref 96–108)
CO2 SERPL-SCNC: 27 MMOL/L — SIGNIFICANT CHANGE UP (ref 22–31)
CREAT SERPL-MCNC: 0.72 MG/DL — SIGNIFICANT CHANGE UP (ref 0.5–1.3)
EGFR: 90 ML/MIN/1.73M2 — SIGNIFICANT CHANGE UP
EOSINOPHIL # BLD AUTO: 0.41 K/UL — SIGNIFICANT CHANGE UP (ref 0–0.5)
EOSINOPHIL NFR BLD AUTO: 5.2 % — SIGNIFICANT CHANGE UP (ref 0–6)
GLUCOSE SERPL-MCNC: 88 MG/DL — SIGNIFICANT CHANGE UP (ref 70–99)
HCT VFR BLD CALC: 39.2 % — SIGNIFICANT CHANGE UP (ref 34.5–45)
HGB BLD-MCNC: 13.6 G/DL — SIGNIFICANT CHANGE UP (ref 11.5–15.5)
IMM GRANULOCYTES NFR BLD AUTO: 0.1 % — SIGNIFICANT CHANGE UP (ref 0–0.9)
LYMPHOCYTES # BLD AUTO: 2.59 K/UL — SIGNIFICANT CHANGE UP (ref 1–3.3)
LYMPHOCYTES # BLD AUTO: 32.7 % — SIGNIFICANT CHANGE UP (ref 13–44)
MCHC RBC-ENTMCNC: 31.9 PG — SIGNIFICANT CHANGE UP (ref 27–34)
MCHC RBC-ENTMCNC: 34.7 G/DL — SIGNIFICANT CHANGE UP (ref 32–36)
MCV RBC AUTO: 92 FL — SIGNIFICANT CHANGE UP (ref 80–100)
MONOCYTES # BLD AUTO: 0.9 K/UL — SIGNIFICANT CHANGE UP (ref 0–0.9)
MONOCYTES NFR BLD AUTO: 11.4 % — SIGNIFICANT CHANGE UP (ref 2–14)
NEUTROPHILS # BLD AUTO: 3.94 K/UL — SIGNIFICANT CHANGE UP (ref 1.8–7.4)
NEUTROPHILS NFR BLD AUTO: 49.7 % — SIGNIFICANT CHANGE UP (ref 43–77)
NRBC # BLD: 0 /100 WBCS — SIGNIFICANT CHANGE UP (ref 0–0)
PLATELET # BLD AUTO: 229 K/UL — SIGNIFICANT CHANGE UP (ref 150–400)
POTASSIUM SERPL-MCNC: 3.8 MMOL/L — SIGNIFICANT CHANGE UP (ref 3.5–5.3)
POTASSIUM SERPL-SCNC: 3.8 MMOL/L — SIGNIFICANT CHANGE UP (ref 3.5–5.3)
PROT SERPL-MCNC: 7.2 GM/DL — SIGNIFICANT CHANGE UP (ref 6–8.3)
RBC # BLD: 4.26 M/UL — SIGNIFICANT CHANGE UP (ref 3.8–5.2)
RBC # FLD: 12.8 % — SIGNIFICANT CHANGE UP (ref 10.3–14.5)
SODIUM SERPL-SCNC: 140 MMOL/L — SIGNIFICANT CHANGE UP (ref 135–145)
WBC # BLD: 7.92 K/UL — SIGNIFICANT CHANGE UP (ref 3.8–10.5)
WBC # FLD AUTO: 7.92 K/UL — SIGNIFICANT CHANGE UP (ref 3.8–10.5)

## 2024-11-03 PROCEDURE — 99285 EMERGENCY DEPT VISIT HI MDM: CPT

## 2024-11-03 PROCEDURE — 99222 1ST HOSP IP/OBS MODERATE 55: CPT

## 2024-11-03 PROCEDURE — 72131 CT LUMBAR SPINE W/O DYE: CPT | Mod: 26,MC

## 2024-11-03 RX ORDER — ENOXAPARIN SODIUM 40MG/0.4ML
40 SYRINGE (ML) SUBCUTANEOUS EVERY 24 HOURS
Refills: 0 | Status: DISCONTINUED | OUTPATIENT
Start: 2024-11-03 | End: 2024-11-03

## 2024-11-03 RX ORDER — ACETAMINOPHEN 500 MG
975 TABLET ORAL ONCE
Refills: 0 | Status: COMPLETED | OUTPATIENT
Start: 2024-11-03 | End: 2024-11-03

## 2024-11-03 RX ORDER — ACETAMINOPHEN 500 MG
650 TABLET ORAL EVERY 6 HOURS
Refills: 0 | Status: DISCONTINUED | OUTPATIENT
Start: 2024-11-03 | End: 2024-11-05

## 2024-11-03 RX ORDER — ONDANSETRON HYDROCHLORIDE 2 MG/ML
4 INJECTION, SOLUTION INTRAMUSCULAR; INTRAVENOUS EVERY 8 HOURS
Refills: 0 | Status: DISCONTINUED | OUTPATIENT
Start: 2024-11-03 | End: 2024-11-05

## 2024-11-03 RX ORDER — MELATONIN 5 MG
3 TABLET ORAL AT BEDTIME
Refills: 0 | Status: DISCONTINUED | OUTPATIENT
Start: 2024-11-03 | End: 2024-11-05

## 2024-11-03 RX ORDER — POLYETHYLENE GLYCOL 3350 17 G/17G
17 POWDER, FOR SOLUTION ORAL DAILY
Refills: 0 | Status: DISCONTINUED | OUTPATIENT
Start: 2024-11-03 | End: 2024-11-05

## 2024-11-03 RX ORDER — MAGNESIUM, ALUMINUM HYDROXIDE 200-200 MG
30 TABLET,CHEWABLE ORAL EVERY 4 HOURS
Refills: 0 | Status: DISCONTINUED | OUTPATIENT
Start: 2024-11-03 | End: 2024-11-05

## 2024-11-03 RX ORDER — PANTOPRAZOLE SODIUM 40 MG/1
40 TABLET, DELAYED RELEASE ORAL
Refills: 0 | Status: DISCONTINUED | OUTPATIENT
Start: 2024-11-03 | End: 2024-11-05

## 2024-11-03 RX ORDER — APIXABAN 5 MG/1
5 TABLET, FILM COATED ORAL EVERY 12 HOURS
Refills: 0 | Status: DISCONTINUED | OUTPATIENT
Start: 2024-11-03 | End: 2024-11-05

## 2024-11-03 RX ORDER — TRAMADOL HYDROCHLORIDE 50 MG/1
50 TABLET, COATED ORAL EVERY 6 HOURS
Refills: 0 | Status: DISCONTINUED | OUTPATIENT
Start: 2024-11-03 | End: 2024-11-05

## 2024-11-03 RX ORDER — INFLUENZ VIR VAC TV P-SURF2003 15MCG/.5ML
0.5 SYRINGE (ML) INTRAMUSCULAR ONCE
Refills: 0 | Status: DISCONTINUED | OUTPATIENT
Start: 2024-11-03 | End: 2024-11-05

## 2024-11-03 RX ORDER — MONTELUKAST SODIUM 10 MG/1
10 TABLET, FILM COATED ORAL DAILY
Refills: 0 | Status: DISCONTINUED | OUTPATIENT
Start: 2024-11-03 | End: 2024-11-05

## 2024-11-03 RX ORDER — OXYCODONE HYDROCHLORIDE 30 MG/1
10 TABLET ORAL EVERY 4 HOURS
Refills: 0 | Status: DISCONTINUED | OUTPATIENT
Start: 2024-11-03 | End: 2024-11-05

## 2024-11-03 RX ADMIN — Medication 975 MILLIGRAM(S): at 18:39

## 2024-11-03 NOTE — H&P ADULT - NSICDXPASTMEDICALHX_GEN_ALL_CORE_FT
PAST MEDICAL HISTORY:  Asthma     GERD (gastroesophageal reflux disease)     Pulmonary embolism diagnosed June 12, 2019 at [facility] and started on Eliquis

## 2024-11-03 NOTE — H&P ADULT - NSHPPHYSICALEXAM_GEN_ALL_CORE
T(C): 36.9 (11-03-24 @ 16:14), Max: 36.9 (11-03-24 @ 16:14)  HR: 88 (11-03-24 @ 16:14) (88 - 88)  BP: 121/87 (11-03-24 @ 16:14) (121/87 - 121/87)  RR: 20 (11-03-24 @ 16:14) (20 - 20)  SpO2: 96% (11-03-24 @ 16:14) (96% - 96%)    CONSTITUTIONAL: Well groomed, no apparent distress  EYES: PERRLA and symmetric, EOMI, No conjunctival or scleral injection, non-icteric  ENMT: Oral mucosa with moist membranes. Normal dentition; no pharyngeal injection or exudates             NECK: Supple, symmetric and without tracheal deviation   RESP: No respiratory distress, no use of accessory muscles; CTA b/l, no WRR  CV: RRR, +S1S2, no MRG; no JVD; no peripheral edema  GI: Soft, NT, ND, no rebound, no guarding; no palpable masses; no hepatosplenomegaly; no hernia palpated  LYMPH: No cervical LAD or tenderness; no axillary LAD or tenderness; no inguinal LAD or tenderness  MSK: Normal gait; No digital clubbing or cyanosis; examination of the (head/neck/spine/ribs/pelvis, RUE, LUE, RLE, LLE) without misalignment,            Normal ROM without pain, no spinal tenderness, normal muscle strength/tone  SKIN: No rashes or ulcers noted; no subcutaneous nodules or induration palpable  NEURO: 2/5 RLE motor strength  PSYCH: Appropriate insight/judgment; A+O x 3, mood and affect appropriate, recent/remote memory intact

## 2024-11-03 NOTE — H&P ADULT - NSHPLABSRESULTS_GEN_ALL_CORE
13.6   7.92  )-----------( 229      ( 03 Nov 2024 18:46 )             39.2     11-03    140  |  107  |  15  ----------------------------<  88  3.8   |  27  |  0.72    Ca    8.6      03 Nov 2024 18:46    TPro  7.2  /  Alb  3.3  /  TBili  0.2  /  DBili  x   /  AST  28  /  ALT  38  /  AlkPhos  88  11-03      Urinalysis Basic - ( 03 Nov 2024 18:46 )    Color: x / Appearance: x / SG: x / pH: x  Gluc: 88 mg/dL / Ketone: x  / Bili: x / Urobili: x   Blood: x / Protein: x / Nitrite: x   Leuk Esterase: x / RBC: x / WBC x   Sq Epi: x / Non Sq Epi: x / Bacteria: x

## 2024-11-03 NOTE — ED PROVIDER NOTE - CARE PLAN
Patient: Doc Heart Date: 2021  : 1947 Attending: Gokul Trejo MD  76year old female     PCP: Bonny Mercado MD     AM [id number] NOTE  76year old female with hx of HTN,DM, Dyslipidemia, Diastolic heart failure is admitted with community acquired pneumonia      Assessment:  1. Community acquired pneumonia-treated  2. Hx of HFpEF--Acute on chronic  3. Severe pulmonary hypertension  4. HTN  5. DM, Type 2  6. Dyslipidemia  7. Empty sella syndrome  8. FH of CAD  9. Obesity  10. Acute renal insufficiency-improved  11. Metabolic alkalosis-secondary to diuresis  Â   Plan  1. Broad spectrum antibiotics  2. Diuretics per Renal Service--hold on some meds to increase renal perfusion--clarify diuretics at discharge--both bumetanide and furosemide listed on PTA med list  3. ECHO results reviewed. 4. Continue other cardiac meds--hydralazine has been held last few days  5. DVT prophylaxis    Okay for discharge from a cardiology perspective. Okay to continue to stay off of hydralazine for now and continue carvedilol/isosorbide;   Spironolactone per Dr. Robert Gonzalez. In view of her current presentation, severe pulmonary hypertension, I have recommended that she follow-up with her cardiologist at [facility] after discharge. Pt has had an initial evaluation for amyloid heart disease at [facility] based on old records--status unclear  The records of her recent echocardiogram will be available in the care everywhere portion of epic. Deangelo Driscoll. Herbert Luo MD  [facility]     Primary cardiologist: [facility]--Dr. Natividad Capellan    ______________________________________________    Subjective: Notes reviewed. Pt seen and examined. Feeling much better. Breathing comfortably on nasal cannula. There were no problems overnight. There has been no chest pain or chest pressure.      Review of Systems     Pertinent Reviewed:   Notes reviewed     Vitals:    21 2311 21 0326 21 6744 12/09/21 0600   BP: 91/46 132/65 112/62    BP Location: LUE - Left upper extremity      Patient Position: Supine      Pulse: (!) 53 (!) 54 (!) 57    Resp:       Temp: 98.6 Â°F (37 Â°C) 97.2 Â°F (36.2 Â°C) 97.5 Â°F (36.4 Â°C)    TempSrc: Oral      SpO2: 99% 98% 95%    Weight:    109.8 kg (242 lb 1 oz)   Height:             Weight    12/01/21 2000 12/06/21 1500 12/07/21 0600 12/09/21 0600   Weight: 115.6 kg (254 lb 13.6 oz) 107.9 kg (237 lb 14 oz) 107.8 kg (237 lb 10.5 oz) 109.8 kg (242 lb 1 oz)       Intake/Output:    I/O last 3 completed shifts: In: 1090 [P.O.:1080; I.V.:10]  Out: -       Intake/Output Summary (Last 24 hours) at 12/9/2021 1054  Last data filed at 12/8/2021 1800  Gross per 24 hour   Intake 1090 ml   Output --   Net 1090 ml       Telemetry: Reviewed--NSR with occasional PVCs    Physical Exam  Constitutional:       General: She is not in acute distress. Appearance: Normal appearance. She is obese. HENT:      Head: Normocephalic and atraumatic. Right Ear: External ear normal.      Left Ear: External ear normal.   Eyes:      Extraocular Movements: Extraocular movements intact. Conjunctiva/sclera: Conjunctivae normal.   Cardiovascular:      Rate and Rhythm: Normal rate and regular rhythm. Pulses:           Carotid pulses are 2+ on the right side and 2+ on the left side. Dorsalis pedis pulses are 1+ on the right side and 1+ on the left side. Posterior tibial pulses are 1+ on the right side and 1+ on the left side. Heart sounds: Heart sounds are distant. Pulmonary:      Effort: Pulmonary effort is normal. No respiratory distress. Breath sounds: Rales present. Abdominal:      General: There is no distension. Tenderness: There is no abdominal tenderness. Musculoskeletal:      Right lower leg: No edema. Left lower leg: No edema. Skin:     General: Skin is warm and dry. Neurological:      General: No focal deficit present.       Mental Status: She is alert and oriented to person, place, and time. Psychiatric:         Mood and Affect: Mood normal.         Behavior: Behavior normal.         Meds:     Current Facility-Administered Medications   Medication   â¢ torsemide (DEMADEX) tablet 20 mg   â¢ carvedilol (COREG) tablet 25 mg   â¢ hydrALAZINE (APRESOLINE) tablet 25 mg   â¢ [Held by provider] insulin glargine (LANTUS) injection 8 Units   â¢ insulin lispro (ADMELOG,HumaLOG) - Correction Dose   â¢ enoxaparin (LOVENOX) injection 40 mg   â¢ sodium chloride 0.9 % flush bag 25 mL   â¢ sodium chloride (PF) 0.9 % injection 2 mL   â¢ sodium chloride 0.9 % flush bag 25 mL   â¢ acetaminophen (TYLENOL) tablet 650 mg   â¢ ondansetron (ZOFRAN) injection 4 mg   â¢ HYDROcodone-acetaminophen (NORCO) 5-325 MG per tablet 1 tablet   â¢ polyethylene glycol (MIRALAX) packet 17 g   â¢ aspirin chewable 81 mg   â¢ atorvastatin (LIPITOR) tablet 10 mg   â¢ donepezil (ARICEPT) tablet 5 mg   â¢ fluticasone (FLONASE) 50 MCG/ACT nasal spray 2 spray   â¢ isosorbide mononitrate (IMDUR) ER tablet 30 mg   â¢ magnesium oxide (MAG-OX) tablet 400 mg   â¢ [Held by provider] potassium CHLORIDE ER tablet 10 mEq   â¢ dextrose 50 % injection 25 g   â¢ dextrose 50 % injection 12.5 g   â¢ glucagon (GLUCAGEN) injection 1 mg   â¢ dextrose (GLUTOSE) 40 % gel 15 g   â¢ dextrose (GLUTOSE) 40 % gel 30 g       Laboratory Results:  Recent Labs     12/07/21  0627 12/08/21  0658   SODIUM 136 139   POTASSIUM 3.6 3.8   CO2 >45* >45*   ANIONGAP <3* <8*   GLUCOSE 128* 151*   BUN 31* 30*   CREATININE 0.83 0.81   BCRAT 37* 37*   CALCIUM 9.5 9.4        Recent Labs     12/07/21  0627   WBC 6.4   HGB 9.9*   HCT 29.9*         Radiology Results:  CT CHEST WO CONTRAST   Final Result   Impression:       Extensive left lung abnormalities with areas of consolidation. This can   obscure potential pulmonary masses, nodules. Small left pleural effusion. Follow-up CT of the chest short interval should be obtained for resolution   of above findings. Preeti Min Electronically Signed by: Marisol Camilo M.D. Signed on: 12/6/2021 1:07 PM          XR CHEST PA AND LATERAL 2 VIEWS   Final Result      1. Stable exam.                        Electronically Signed by: Miguel Ángel Malin M.D. Signed on: 12/6/2021 9:13 AM          XR CHEST PA OR AP 1 VIEW   Final Result       As above. Electronically Signed by: Miguel Ángel Malin M.D. Signed on: 12/6/2021 7:28 AM          XR CHEST PA OR AP 1 VIEW   Final Result       As above. Electronically Signed by: Miguel Ángel Malin M.D. Signed on: 11/30/2021 6:29 PM            ECHO 12/3/21  1. Left ventricle: The cavity size is normal. Wall thickness is moderately     increased. There is concentric hypertrophy. The ejection fraction was     measured by biplane method of disks. Doppler parameters are consistent     with abnormal left ventricular relaxation and increased filling     pressure (grade 2 diastolic dysfunction). The average 2D speckle global     longitudinal strain is abnormal. The global longitudinal strain value     is -13%. The ejection fraction is 65%. 2. Mitral valve: Mild regurgitation. 3. Left atrium: The atrium is severely dilated. 4. Right atrium: The atrium is moderately dilated. 5. Tricuspid valve: Moderate-severe regurgitation. 6. Pulmonary arteries: Systolic pressure is severely increased, estimated     to be 82mm Hg. 7. Inferior vena cava: The vessel is dilated. The respirophasic diameter     changes are blunted (less than 50%). 8. Pericardium, extracardiac: There is no pericardial effusion. There is a     left pleural effusion. Relevant Results:  No results displayed because visit has over 200 results.         Encounter Date: 11/30/21   Electrocardiogram 12-Lead   Result Value    Ventricular Rate EKG/Min (BPM) 76    Atrial Rate (BPM) 76    ID-Interval (MSEC) 134    QRS-Interval (MSEC) 82    QT-Interval (MSEC) 378    QTc 425    P Axis (Degrees) 62    R Axis (Degrees) 11    T Axis (Degrees) 19    REPORT TEXT      Normal sinus rhythm  Nonspecific ST abnormality  When compared with ECG of  30-NOV-2021 18:22,  No significant change was found  Confirmed by Oly Quintanilla MD, [facility] ([id number]) on 12/7/2021 12:51:23 PM       No problem-specific Assessment & Plan notes found for this encounter. 1. Pneumonia of left lung due to infectious organism, unspecified part of lung    2. Hypoxemia    3. Acute on chronic congestive heart failure, unspecified heart failure type (CMS/[facility])        Impression:  There is no problem list on file for this patient. Code Status:    Code Status: Full Resuscitation    Medications/Infusions:    â¢ torsemide  20 mg Oral Daily   â¢ carvedilol  25 mg Oral BID WC   â¢ hydrALAZINE  25 mg Oral 3 times per day   â¢ [Held by provider] insulin glargine  8 Units Subcutaneous Nightly   â¢ insulin lispro   Subcutaneous TID WC   â¢ enoxaparin  40 mg Subcutaneous 2 times per day   â¢ sodium chloride (PF)  2 mL Intracatheter 2 times per day   â¢ aspirin  81 mg Oral Daily   â¢ atorvastatin  10 mg Oral Daily   â¢ donepezil  5 mg Oral Nightly   â¢ fluticasone  2 spray Each Nare Daily   â¢ isosorbide mononitrate  30 mg Oral Daily   â¢ magnesium oxide  400 mg Oral BID   â¢ [Held by provider] potassium chloride  10 mEq Oral Daily             Lorne Gonzalez.  Jerry Echeverria MD  PATIENT’S [facility]  12/9/2021 1 Principal Discharge DX:	Weakness

## 2024-11-03 NOTE — H&P ADULT - HISTORY OF PRESENT ILLNESS
69F w/ hx of asthma, PE on eliquis p/w RLE weakness. Pt reports worsening RLE weakness since receiving pain relief injections several weeks prior. Pt denies falls, fevers, fecal/urinary incontinence.    In ED, pt afebrile and HDS. Labs unremarkable. CT demonstrates L4-S1 neuronal foraminal stenosis

## 2024-11-03 NOTE — H&P ADULT - ASSESSMENT
69F w/ hx of asthma, PE on eliquis p/w RLE weakness likely 2/2 foraminal stenosis.    #RLE weakness/pain  - continue home pain regimen  - PT  - consider neuro consult; but weakness is chronic    #Afib  - continue eliquis    #FEN/PPX  - regular diet  - eliquis for DVT ppx

## 2024-11-03 NOTE — ED PROVIDER NOTE - CLINICAL SUMMARY MEDICAL DECISION MAKING FREE TEXT BOX
69-year-old female with a past medical history of lumbar spinal surgery, Parkinson's disease presenting with right lower extremity weakness. Patient states she had injections into her right lower extremity 4 weeks ago. Patient states since the injection she has had weakness of the right lower extremity and difficulty walking. Denies back pain, bowel or bladder incontinence, fevers, chest pain, difficulty breathing, nausea, vomiting, abdominal pain. Patient states has been walking with her walker. Patient states her leg feels heavy. Physical exam reveals well-appearing female, heart rate regular, clear breath sounds bilaterally, soft nontender abdomen, 1/5 strength in the right hip flexion, plantarflexion 5/5 strength in the right lower extremity, 2/5 strength in right dorsiflexion, sensation intact in the right lower extremity. Patient states she does not want to be admitted to rehab center. CBC, CMP, CT lumbar spine, Tylenol, orthopedic consult. 69-year-old female with a past medical history of lumbar spinal surgery, Parkinson's disease presenting with right lower extremity weakness. Patient states she had injections into her right lower extremity 4 weeks ago. Patient states since the injection she has had weakness of the right lower extremity and difficulty walking. Denies back pain, bowel or bladder incontinence, fevers, chest pain, difficulty breathing, nausea, vomiting, abdominal pain. Patient states has been walking with her walker. Patient states her leg feels heavy. Physical exam reveals well-appearing female, heart rate regular, clear breath sounds bilaterally, soft nontender abdomen, 1/5 strength in the right hip flexion, plantarflexion 5/5 strength in the right lower extremity, 2/5 strength in right dorsiflexion, sensation intact in the right lower extremity, right DP pulse intact, capillary refill <1 second in right MARISABEL. Patient states she does not want to be admitted to rehab center. CBC, CMP, CT lumbar spine, Tylenol, orthopedic consult. 69-year-old female with a past medical history of lumbar spinal surgery, Parkinson's disease presenting with right lower extremity weakness. Patient states she had injections into her right lower extremity 4 weeks ago. Patient states since the injection she has had weakness of the right lower extremity and difficulty walking. Denies back pain, bowel or bladder incontinence, fevers, chest pain, difficulty breathing, nausea, vomiting, abdominal pain. Patient states has been walking with her walker. Patient states her leg feels heavy. Physical exam reveals well-appearing female, heart rate regular, clear breath sounds bilaterally, soft nontender abdomen, 1/5 strength in the right hip flexion, plantarflexion 5/5 strength in the right lower extremity, 2/5 strength in right dorsiflexion, sensation intact in the right lower extremity, right DP pulse intact, capillary refill <1 second in right MARISABEL. Patient states she does not want to be admitted to rehab center. CBC, CMP, CT lumbar spine, Tylenol, orthopedic consult.    Trip: signed out to me. patient unable to go from supine/lying position to sitting position in bed w/o assistance. spoke to cousin on phone per patient request and informed him of safety risk due to potential fall/weakness. can benefit from PT eval. patient and cousin comfortable w/ caro n. 69-year-old female with a past medical history of Asthma, PE on eliquis, lumbar spinal surgery, presenting with right lower extremity weakness. Patient states she had injections into her right lower extremity 4 weeks ago. Patient states since the injection she has had weakness of the right lower extremity and difficulty walking. Denies back pain, bowel or bladder incontinence, fevers, chest pain, difficulty breathing, nausea, vomiting, abdominal pain. Patient states has been walking with her walker. Patient states her leg feels heavy. Physical exam reveals well-appearing female, heart rate regular, clear breath sounds bilaterally, soft nontender abdomen, 1/5 strength in the right hip flexion, plantarflexion 5/5 strength in the right lower extremity, 2/5 strength in right dorsiflexion, sensation intact in the right lower extremity, right DP pulse intact, capillary refill <1 second in right MARISABEL. Patient states she does not want to be admitted to rehab center. CBC, CMP, CT lumbar spine, Tylenol, orthopedic consult.    Trip: signed out to me. patient unable to go from supine/lying position to sitting position in bed w/o assistance. spoke to cousin on phone per patient request and informed him of safety risk due to potential fall/weakness. can benefit from PT eval. patient and cousin comfortable w/ caro n.

## 2024-11-03 NOTE — ED PROVIDER NOTE - PHYSICAL EXAMINATION
General: Appears well and nontoxic  Mentation: AAO x 3  psych: mood appropriate  HEENT: airway patent, conjunctivae clear bilaterally  Resp: symmetric chest rise, no resp distress, breath sounds CTA bilaterally  Cardio: RRR, no m/r/g, right MARISABEL DP pulse intact, Capillary refill <1 second in the right MARISABEL  GI: soft/nondistended/nontender  Neuro: 1/5 strength in the right hip flexion, plantarflexion 5/5 strength in the right lower extremity, 2/5 strength in right dorsiflexion, sensation intact in the right lower extremity, resting tremor  Skin: no cyanosis, no jaundice  MSK: normal movement of all extremities  Lymph/Vasc: no LE edema Negative

## 2024-11-03 NOTE — ED ADULT NURSE NOTE - NSICDXPASTMEDICALHX_GEN_ALL_CORE_FT
PAST MEDICAL HISTORY:  Asthma     GERD (gastroesophageal reflux disease)     Pulmonary embolism diagnosed June 12, 2019 at [facility] and started on Eliquis

## 2024-11-03 NOTE — ED ADULT NURSE NOTE - NSFALLRISKINTERV_ED_ALL_ED

## 2024-11-03 NOTE — ED PROVIDER NOTE - PATIENT'S GENDER IDENTITY
"Medication is being filled for 1 time refill only due to:  due for labs and appt with provider for further refills    Requested Prescriptions   Pending Prescriptions Disp Refills     lisinopril (ZESTRIL) 40 MG tablet 90 tablet 0     Sig: Take 1 tablet (40 mg) by mouth daily       ACE Inhibitors (Including Combos) Protocol Failed - 6/30/2022  4:36 PM        Failed - Blood pressure under 140/90 in past 12 months     BP Readings from Last 3 Encounters:   01/16/20 124/67   06/29/18 128/72   10/28/16 130/78                 Failed - Normal serum creatinine on file in past 12 months     Recent Labs   Lab Test 06/29/18  0851   CR 1.18       Ok to refill medication if creatinine is low          Failed - Normal serum potassium on file in past 12 months     Recent Labs   Lab Test 06/29/18  0851   POTASSIUM 4.4             Passed - Recent (12 mo) or future (30 days) visit within the authorizing provider's specialty     Patient has had an office visit with the authorizing provider or a provider within the authorizing providers department within the previous 12 mos or has a future within next 30 days. See \"Patient Info\" tab in inbasket, or \"Choose Columns\" in Meds & Orders section of the refill encounter.              Passed - Medication is active on med list        Passed - Patient is age 18 or older           Ai Edwards RN  [facility]    " Female

## 2024-11-03 NOTE — ED ADULT NURSE NOTE - PAIN: BODY LOCATION
Relevant Problems   No relevant active problems       Anesthetic History   No history of anesthetic complications            Review of Systems / Medical History  Patient summary reviewed, nursing notes reviewed and pertinent labs reviewed    Pulmonary  Within defined limits                 Neuro/Psych   Within defined limits           Cardiovascular              Hyperlipidemia    Exercise tolerance: >4 METS     GI/Hepatic/Renal  Within defined limits              Endo/Other        Arthritis and cancer (prostate)     Other Findings              Physical Exam    Airway  Mallampati: II  TM Distance: 4 - 6 cm  Neck ROM: normal range of motion   Mouth opening: Normal     Cardiovascular  Regular rate and rhythm,  S1 and S2 normal,  no murmur, click, rub, or gallop             Dental  No notable dental hx       Pulmonary  Breath sounds clear to auscultation               Abdominal  GI exam deferred       Other Findings            Anesthetic Plan    ASA: 2  Anesthesia type: general    Monitoring Plan: BIS      Induction: Intravenous  Anesthetic plan and risks discussed with: Patient
leg/Right:

## 2024-11-03 NOTE — PATIENT PROFILE ADULT - FALL HARM RISK - HARM RISK INTERVENTIONS
Assistance with ambulation/Assistance OOB with selected safe patient handling equipment/Communicate Risk of Fall with Harm to all staff/Discuss with provider need for PT consult/Monitor gait and stability/Reinforce activity limits and safety measures with patient and family/Tailored Fall Risk Interventions/Visual Cue: Yellow wristband and red socks/Bed in lowest position, wheels locked, appropriate side rails in place/Call bell, personal items and telephone in reach/Instruct patient to call for assistance before getting out of bed or chair/Non-slip footwear when patient is out of bed/[place] to call system/Physically safe environment - no spills, clutter or unnecessary equipment/Purposeful Proactive Rounding/Room/bathroom lighting operational, light cord in reach

## 2024-11-03 NOTE — ED ADULT NURSE NOTE - OBJECTIVE STATEMENT
patient alert and oriented x4, came in for leg pain. pt states for the past 1x month she's been having numbness and pain of the right leg, making it difficult to ambulate and lift leg. pt has had a history of this discomfort and has been to doctor for injection in right leg, pt unaware of name of injection. pt came in today due to worsening discomfort and numbness. pmh asthma, athritis. pt in no acute respiratory distress or discomfort at this time. fall precautions maintained. safety precautions maintained. pt denies slurred speech, facial droop, nausea, vomiting, dizziness.

## 2024-11-03 NOTE — PATIENT PROFILE ADULT - FUNCTIONAL ASSESSMENT - BASIC MOBILITY 6.
2-calculated by average/Not able to assess (calculate score using [facility] averaging method)

## 2024-11-03 NOTE — ED PROVIDER NOTE - NSICDXPASTMEDICALHX_GEN_ALL_CORE_FT
PAST MEDICAL HISTORY:  Asthma     GERD (gastroesophageal reflux disease)     Pulmonary embolism diagnosed June 12, 2019 at [facility] and started on Eliquis

## 2024-11-04 DIAGNOSIS — Z29.9 ENCOUNTER FOR PROPHYLACTIC MEASURES, UNSPECIFIED: ICD-10-CM

## 2024-11-04 DIAGNOSIS — R29.898 OTHER SYMPTOMS AND SIGNS INVOLVING THE MUSCULOSKELETAL SYSTEM: ICD-10-CM

## 2024-11-04 PROCEDURE — 99232 SBSQ HOSP IP/OBS MODERATE 35: CPT

## 2024-11-04 RX ADMIN — APIXABAN 5 MILLIGRAM(S): 5 TABLET, FILM COATED ORAL at 17:43

## 2024-11-04 RX ADMIN — MONTELUKAST SODIUM 10 MILLIGRAM(S): 10 TABLET, FILM COATED ORAL at 13:10

## 2024-11-04 RX ADMIN — PANTOPRAZOLE SODIUM 40 MILLIGRAM(S): 40 TABLET, DELAYED RELEASE ORAL at 09:06

## 2024-11-04 RX ADMIN — APIXABAN 5 MILLIGRAM(S): 5 TABLET, FILM COATED ORAL at 06:06

## 2024-11-04 NOTE — PROGRESS NOTE ADULT - PROBLEM SELECTOR PLAN 1
- CT L-spine showed moderate bilateral neural foraminal stenosis L4-5. Cannot exclude mild impingement of the exiting left L4 nerve root. Moderate b/l neural foraminal stenosis L5-S1. Cannot exclude mild impingement of both exiting L5 nerve roots.  - neuro consulted   - continue home pain regimen  - PT eval

## 2024-11-04 NOTE — PROGRESS NOTE ADULT - SUBJECTIVE AND OBJECTIVE BOX
PROGRESS NOTE:     Patient is a 69y old  Female who presents with a chief complaint of     SUBJECTIVE / OVERNIGHT EVENTS: Pt c/o RLE pain and weakness.     ADDITIONAL REVIEW OF SYSTEMS:    MEDICATIONS  (STANDING):  apixaban 5 milliGRAM(s) Oral every 12 hours  influenza  Vaccine (HIGH DOSE) 0.5 milliLiter(s) IntraMuscular once  montelukast 10 milliGRAM(s) Oral daily  pantoprazole    Tablet 40 milliGRAM(s) Oral before breakfast  polyethylene glycol 3350 17 Gram(s) Oral daily    MEDICATIONS  (PRN):  acetaminophen     Tablet .. 650 milliGRAM(s) Oral every 6 hours PRN Temp greater or equal to 38C (100.4F), Mild Pain (1 - 3)  aluminum hydroxide/magnesium hydroxide/simethicone Suspension 30 milliLiter(s) Oral every 4 hours PRN Dyspepsia  bisacodyl 5 milliGRAM(s) Oral every 12 hours PRN Constipation  melatonin 3 milliGRAM(s) Oral at bedtime PRN Insomnia  ondansetron Injectable 4 milliGRAM(s) IV Push every 8 hours PRN Nausea and/or Vomiting  oxyCODONE    IR 10 milliGRAM(s) Oral every 4 hours PRN Severe Pain (7 - 10)  traMADol 50 milliGRAM(s) Oral every 6 hours PRN Mild Pain (1 - 3)      CAPILLARY BLOOD GLUCOSE        I&O's Summary    03 Nov 2024 07:01  -  04 Nov 2024 07:00  --------------------------------------------------------  IN: 0 mL / OUT: 750 mL / NET: -750 mL        PHYSICAL EXAM:  Vital Signs Last 24 Hrs  T(C): 36.4 (04 Nov 2024 11:18), Max: 36.9 (03 Nov 2024 16:14)  T(F): 97.5 (04 Nov 2024 11:18), Max: 98.4 (03 Nov 2024 16:14)  HR: 76 (04 Nov 2024 11:18) (69 - 94)  BP: 144/86 (04 Nov 2024 11:18) (120/85 - 146/88)  BP(mean): --  RR: 18 (04 Nov 2024 11:18) (17 - 20)  SpO2: 98% (04 Nov 2024 11:18) (96% - 98%)    Parameters below as of 04 Nov 2024 11:18  Patient On (Oxygen Delivery Method): room air        CONSTITUTIONAL: NAD, well-developed  RESPIRATORY: Normal respiratory effort; lungs are clear to auscultation bilaterally  CARDIOVASCULAR: Regular rate and rhythm, normal S1 and S2, no murmur/rub/gallop; No lower extremity edema; Peripheral pulses are 2+ bilaterally  ABDOMEN: Nontender to palpation, normoactive bowel sounds, no rebound/guarding; No hepatosplenomegaly  MUSCLOSKELETAL: no clubbing or cyanosis of digits; no joint swelling or tenderness to palpation  PSYCH: A+O to person, place, and time; affect appropriate  NEURO: Face symmetric, speech fluent, strength 5/5 in UE's, 2/5 in RLE w/ R foot drop     LABS:                        13.6   7.92  )-----------( 229      ( 03 Nov 2024 18:46 )             39.2     11-03    140  |  107  |  15  ----------------------------<  88  3.8   |  27  |  0.72    Ca    8.6      03 Nov 2024 18:46    TPro  7.2  /  Alb  3.3  /  TBili  0.2  /  DBili  x   /  AST  28  /  ALT  38  /  AlkPhos  88  11-03          Urinalysis Basic - ( 03 Nov 2024 18:46 )    Color: x / Appearance: x / SG: x / pH: x  Gluc: 88 mg/dL / Ketone: x  / Bili: x / Urobili: x   Blood: x / Protein: x / Nitrite: x   Leuk Esterase: x / RBC: x / WBC x   Sq Epi: x / Non Sq Epi: x / Bacteria: x          RADIOLOGY & ADDITIONAL TESTS:  Results Reviewed:   Imaging Personally Reviewed:  Electrocardiogram Personally Reviewed:    COORDINATION OF CARE:  Care Discussed with Consultants/Other Providers [Y/N]:  Prior or Outpatient Records Reviewed [Y/N]:

## 2024-11-05 VITALS
SYSTOLIC BLOOD PRESSURE: 108 MMHG | HEART RATE: 80 BPM | DIASTOLIC BLOOD PRESSURE: 76 MMHG | OXYGEN SATURATION: 98 % | RESPIRATION RATE: 18 BRPM | TEMPERATURE: 99 F

## 2024-11-05 PROCEDURE — 99239 HOSP IP/OBS DSCHRG MGMT >30: CPT

## 2024-11-05 RX ORDER — FLUTICASONE FUROATE AND VILANTEROL 100; 25 UG/1; UG/1
1 POWDER RESPIRATORY (INHALATION)
Refills: 0 | DISCHARGE

## 2024-11-05 RX ORDER — MELOXICAM 7.5 MG
1 TABLET ORAL
Refills: 0 | DISCHARGE

## 2024-11-05 RX ORDER — CARBIDOPA/LEVODOPA 10MG-100MG
1 TABLET ORAL
Refills: 0 | DISCHARGE

## 2024-11-05 RX ORDER — MIRTAZAPINE 30 MG/1
1 TABLET ORAL
Refills: 0 | DISCHARGE

## 2024-11-05 RX ADMIN — MONTELUKAST SODIUM 10 MILLIGRAM(S): 10 TABLET, FILM COATED ORAL at 12:30

## 2024-11-05 RX ADMIN — POLYETHYLENE GLYCOL 3350 17 GRAM(S): 17 POWDER, FOR SOLUTION ORAL at 12:31

## 2024-11-05 RX ADMIN — APIXABAN 5 MILLIGRAM(S): 5 TABLET, FILM COATED ORAL at 07:09

## 2024-11-05 RX ADMIN — PANTOPRAZOLE SODIUM 40 MILLIGRAM(S): 40 TABLET, DELAYED RELEASE ORAL at 07:09

## 2024-11-05 RX ADMIN — APIXABAN 5 MILLIGRAM(S): 5 TABLET, FILM COATED ORAL at 17:22

## 2024-11-05 NOTE — CONSULT NOTE ADULT - ASSESSMENT
69F w/ hx of asthma, PE on eliquis p/w RLE weakness. Pt reports worsening RLE weakness since receiving pain relief injections several weeks prior  O/E right foot drop brisk patellars  CT L-spine s/p fusion streak artifact    Impression: suspect worsening of LS disease, possible underlying cervical and neurodegenerative process      physical therapy  MRI L-spine here or as an outpt  MR Brain and C-spine as an outpt  outpt EMG  Can follow up with Neurology, Dr. Ricardo Monique at [phone]

## 2024-11-05 NOTE — DISCHARGE NOTE NURSING/CASE MANAGEMENT/SOCIAL WORK - NSDCPEFALRISK_GEN_ALL_CORE
For information on Fall & Injury Prevention, visit: [email or web address] OR  [email or web address] OR  https://www.cdc.gov/steadi/patient.html

## 2024-11-05 NOTE — DISCHARGE NOTE PROVIDER - HOSPITAL COURSE
69F w/ hx of asthma, PE on eliquis p/w RLE weakness likely 2/2 foraminal stenosis.    ·  Problem: Right leg weakness likely d/t worsening of LS disease   ·  Plan: - CT L-spine showed moderate bilateral neural foraminal stenosis L4-5. Cannot exclude mild impingement of the exiting left L4 nerve root. Moderate b/l neural foraminal stenosis L5-S1. Cannot exclude mild impingement of both exiting L5 nerve roots.  - neuro input appreciated   - continue home pain regimen  - MRI L-spine as outpt  - MR Brain and C-spine as an outpt  - outpt EMG  - PT recommends home PT     ·  Problem: Pulmonary thromboembolism.   ·  Plan: Chronic PE  Continue w/ Eliquis.   69F w/ hx of asthma, Parkinson's, PE s/p eliquis, severe lumbar spinal stenosis s/p laminectomy by Ortho 2/26, p/w RLE weakness likely 2/2 foraminal stenosis.    ·  Problem: Right leg weakness likely d/t worsening of LS disease   ·  Plan: - CT L-spine showed moderate bilateral neural foraminal stenosis L4-5. Cannot exclude mild impingement of the exiting left L4 nerve root. Moderate b/l neural foraminal stenosis L5-S1. Cannot exclude mild impingement of both exiting L5 nerve roots.  - neuro input appreciated   - continue home pain regimen  - MRI L-spine as outpt  - MR Brain and C-spine as an outpt  - outpt EMG  - PT recommends home PT     ·  Problem: Pulmonary thromboembolism.   ·  Plan: Chronic PE  Patient no longer on Eliquis, confirmed with PCP    ·  Problem: Asthma   ·  Plan: Stable  Continue Breo Ellipta     ·  Problem: Parkinson's disease   ·  Plan: Continue Sinemet

## 2024-11-05 NOTE — DISCHARGE NOTE NURSING/CASE MANAGEMENT/SOCIAL WORK - PATIENT PORTAL LINK FT
You can access the FollowMyHealth Patient Portal offered by [facility] by registering at the following website: [email or web address]. By joining [facility]’s FollowMyHealth portal, you will also be able to view your health information using other applications (apps) compatible with our system.

## 2024-11-05 NOTE — PHYSICAL THERAPY INITIAL EVALUATION ADULT - WORK/LEISURE ACTIVITY, REHAB EVAL
· Presented from ARH Our Lady of the Way needs device and assist

## 2024-11-05 NOTE — DISCHARGE NOTE NURSING/CASE MANAGEMENT/SOCIAL WORK - FINANCIAL ASSISTANCE
[facility] provides services at a reduced cost to those who are determined to be eligible through [facility]’s financial assistance program. Information regarding [facility]’s financial assistance program can be found by going to [email or web address] or by calling [phone].

## 2024-11-05 NOTE — DISCHARGE NOTE PROVIDER - ATTENDING DISCHARGE PHYSICAL EXAMINATION:
CONSTITUTIONAL: NAD, well-developed  RESPIRATORY: Normal respiratory effort; lungs are clear to auscultation bilaterally  CARDIOVASCULAR: Regular rate and rhythm, normal S1 and S2, no murmur/rub/gallop; No lower extremity edema; Peripheral pulses are 2+ bilaterally  ABDOMEN: Nontender to palpation, normoactive bowel sounds, no rebound/guarding; No hepatosplenomegaly  MUSCLOSKELETAL: no clubbing or cyanosis of digits; no joint swelling or tenderness to palpation  PSYCH: A+O to person, place, and time; affect appropriate  NEURO: Face symmetric, speech fluent, strength 5/5 in UE's, 2/5 in RLE w/ R foot drop

## 2024-11-05 NOTE — DISCHARGE NOTE PROVIDER - NSDCMRMEDTOKEN_GEN_ALL_CORE_FT
albuterol 90 mcg/inh inhalation aerosol: 2 puff(s) inhaled every 6 hours, As needed, Shortness of Breath and/or Wheezing  bisacodyl 5 mg oral delayed release tablet: 1 tab(s) orally every 12 hours, As needed, Constipation  cyclobenzaprine 10 mg oral tablet: 1 tab(s) orally every 8 hours  Discharge instructions: FOllow up with your Cardiologist in 1-2 weeks or sooner if needed  Continue ELiquis early morning on 7/16/19 for a dose every 12hours, you will receive todays dose prior to discharge  continue all other prescribed medications  Tylenol as needed for wrist site disocmfort   Eliquis 5 mg oral tablet: 1 tab(s) orally 2 times a day  folic acid: orally once a day  magnesium hydroxide 8% oral suspension: 30 milliliter(s) orally every 12 hours, As needed, Constipation  montelukast 10 mg oral tablet: 1 tab(s) orally once a day  omeprazole 40 mg oral delayed release capsule: 1 cap(s) orally once a day  oxyCODONE 10 mg oral tablet: 1 tab(s) orally every 4 hours, As needed, Severe Pain (7 - 10)  oxyCODONE 5 mg oral tablet: 1 tab(s) orally every 6 hours, As needed, Moderate Pain (4 - 6)  pantoprazole 40 mg oral delayed release tablet: 1 tab(s) orally once a day (before a meal)  polyethylene glycol 3350 oral powder for reconstitution: 17 gram(s) orally once a day  senna leaf extract oral tablet: 2 tab(s) orally once a day (at bedtime)  Symbicort 160 mcg-4.5 mcg/inh inhalation aerosol: 2 puff(s) inhaled 2 times a day  traMADol 50 mg oral tablet: 1 tab(s) orally every 6 hours, As needed, Mild Pain (1 - 3)  Vitamin B-12: orally once a day  Vitamin B6: orally once a day   albuterol 90 mcg/inh inhalation aerosol: 2 puff(s) inhaled every 6 hours, As needed, Shortness of Breath and/or Wheezing  bisacodyl 5 mg oral delayed release tablet: 1 tab(s) orally every 12 hours, As needed, Constipation  cyclobenzaprine 10 mg oral tablet: 1 tab(s) orally every 8 hours  Eliquis 5 mg oral tablet: 1 tab(s) orally 2 times a day  folic acid: orally once a day  montelukast 10 mg oral tablet: 1 tab(s) orally once a day  oxyCODONE 10 mg oral tablet: 1 tab(s) orally every 4 hours, As needed, Severe Pain (7 - 10)  oxyCODONE 5 mg oral tablet: 1 tab(s) orally every 6 hours, As needed, Moderate Pain (4 - 6)  pantoprazole 40 mg oral delayed release tablet: 1 tab(s) orally once a day (before a meal)  polyethylene glycol 3350 oral powder for reconstitution: 17 gram(s) orally once a day  senna leaf extract oral tablet: 2 tab(s) orally once a day (at bedtime)  Symbicort 160 mcg-4.5 mcg/inh inhalation aerosol: 2 puff(s) inhaled 2 times a day  traMADol 50 mg oral tablet: 1 tab(s) orally every 6 hours, As needed, Mild Pain (1 - 3)  Vitamin B-12: orally once a day  Vitamin B6: orally once a day   albuterol 90 mcg/inh inhalation aerosol: 2 puff(s) inhaled every 6 hours, As needed, Shortness of Breath and/or Wheezing  Breo Ellipta 100 mcg-25 mcg/inh inhalation powder: 1 inhaler(s) inhaled once a day  meloxicam 7.5 mg oral tablet: 1 tab(s) orally once a day  Remeron 15 mg oral tablet: 1 tab(s) orally once a day (at bedtime)  Sinemet 25 mg-100 mg oral tablet: 1 tab(s) orally 3 times a day

## 2024-11-05 NOTE — DISCHARGE NOTE PROVIDER - NSDCCPTREATMENT_GEN_ALL_CORE_FT
PRINCIPAL PROCEDURE  Procedure: CT lumbar spine  Findings and Treatment: Stable post surgical changes with posterior laminectomy L5 and pedicles   jose a fixations L4-S1.  L4-5 at least moderate bilateral neural foraminal stenosis, worse in the   left side. Cannot exclude mild impingement of the exiting left L4 nerve   root.  L5-S1 at least moderate bilateral neural foraminal stenosis. Cannot   exclude mild impingement of both exiting L5 nerve roots.

## 2024-11-05 NOTE — CONSULT NOTE ADULT - SUBJECTIVE AND OBJECTIVE BOX
Neurology consult    ANDRIY [id number]     Patient is a 69y old  Female who presents with a chief complaint of     HPI:  69F w/ hx of asthma, PE on eliquis p/w RLE weakness. Pt reports worsening RLE weakness since receiving pain relief injections several weeks prior. Pt denies falls, fevers, fecal/urinary incontinence.    In ED, pt afebrile and HDS. Labs unremarkable. CT demonstrates L4-S1 neuronal foraminal stenosis (03 Nov 2024 21:39)      no difficulty with language.  No vision loss or double vision.  No dizziness, vertigo or new hearing loss.  . No difficulty with swallowing.  No focal weakness.  No focal sensory changes.  No numbness or tingling in the bilateral lower extremities.  No difficulty with balance.  No difficulty with ambulation.    REVIEW OF SYSTEMS:    Constitutional: No fever, chills, fatigue, weakness  Eyes: no eye pain, visual disturbances, or discharge  ENT:  No difficulty hearing, tinnitus, vertigo; No sinus or throat pain  Neck: No pain or stiffness  Respiratory: No cough, dyspnea, wheezing   Cardiovascular: No chest pain, palpitations,   Gastrointestinal: No abdominal or epigastric pain. No nausea, vomiting  No diarrhea or constipation.   Genitourinary: No dysuria, frequency, hematuria or incontinence  Neurological: No headaches, lightheadedness, vertigo, numbness or tremors  Psychiatric: No depression, anxiety, mood swings or difficulty sleeping  Musculoskeletal: No joint pain or swelling; No muscle, back or extremity pain  Skin: No itching, burning, rashes or lesions   Lymph Nodes: No enlarged glands  Endocrine: No heat or cold intolerance; No hair loss, No h/o diabetes or thyroid dysfunction  Allergy and Immunologic: No hives or eczema    MEDICATIONS    acetaminophen     Tablet .. 650 milliGRAM(s) Oral every 6 hours PRN  aluminum hydroxide/magnesium hydroxide/simethicone Suspension 30 milliLiter(s) Oral every 4 hours PRN  apixaban 5 milliGRAM(s) Oral every 12 hours  bisacodyl 5 milliGRAM(s) Oral every 12 hours PRN  influenza  Vaccine (HIGH DOSE) 0.5 milliLiter(s) IntraMuscular once  melatonin 3 milliGRAM(s) Oral at bedtime PRN  montelukast 10 milliGRAM(s) Oral daily  ondansetron Injectable 4 milliGRAM(s) IV Push every 8 hours PRN  oxyCODONE    IR 10 milliGRAM(s) Oral every 4 hours PRN  pantoprazole    Tablet 40 milliGRAM(s) Oral before breakfast  polyethylene glycol 3350 17 Gram(s) Oral daily  traMADol 50 milliGRAM(s) Oral every 6 hours PRN      PMH: No pertinent past medical history    Pulmonary embolism    Asthma    GERD (gastroesophageal reflux disease)    Asthma         PSH: No significant past surgical history    History of hysterectomy        Family history: No history of dementia, strokes, or seizures   FAMILY HISTORY:      SOCIAL HISTORY:  No history of tobacco or alcohol use     Allergies    No Known Allergies    Intolerances            Vital Signs Last 24 Hrs  T(C): 36.7 (05 Nov 2024 06:09), Max: 37 (04 Nov 2024 16:53)  T(F): 98.1 (05 Nov 2024 06:09), Max: 98.6 (04 Nov 2024 16:53)  HR: 67 (05 Nov 2024 06:09) (67 - 76)  BP: 117/74 (05 Nov 2024 06:09) (117/74 - 145/90)  BP(mean): --  RR: 18 (05 Nov 2024 06:09) (18 - 18)  SpO2: 98% (05 Nov 2024 06:09) (98% - 99%)    Parameters below as of 05 Nov 2024 06:09  Patient On (Oxygen Delivery Method): room air          On Neurological Examination:    Mental Status - Patient is alert, awake, oriented X3. fluent, slow speech    Cranial Nerves - PERRL, EOMI, VFF, V1-V3 intact, no gross facial asymmetry, tongue/uvula midline    Motor Exam -   Right upper 5/5  Left upper 5/5  Right lower 5/5 except dorsiflexion 1/5 inversion eversion 2/5  Left lower 5/5     nml bulk/tone    Sensory    Intact to light touch and pinprick bilaterally    Coord: FTN intact bilaterally     Gait -  defeerred    Reflexes:          brisk 3+ throughout        cross adductor ankles absent toes down+ jaw jerk                                       GENERAL Exam:     Nontoxic , No Acute Distress   	  HEENT:  normocephalic, atraumatic  		  LUNGS:	Clear bilaterally  No Wheeze    	  HEART:	 Normal S1S2   No murmur RRR        	  GI/ ABDOMEN:  Soft  Non tender    EXTREMITIES:   No Edema  No Clubbing  No Cyanosis No Edema    MUSCULOSKELETAL: Normal Range of Motion  	   SKIN:      Normal   No Ecchymosis               LABS:  CBC Full  -  ( 03 Nov 2024 18:46 )  WBC Count : 7.92 K/uL  RBC Count : 4.26 M/uL  Hemoglobin : 13.6 g/dL  Hematocrit : 39.2 %  Platelet Count - Automated : 229 K/uL  Mean Cell Volume : 92.0 fl  Mean Cell Hemoglobin : 31.9 pg  Mean Cell Hemoglobin Concentration : 34.7 g/dL  Auto Neutrophil # : 3.94 K/uL  Auto Lymphocyte # : 2.59 K/uL  Auto Monocyte # : 0.90 K/uL  Auto Eosinophil # : 0.41 K/uL  Auto Basophil # : 0.07 K/uL  Auto Neutrophil % : 49.7 %  Auto Lymphocyte % : 32.7 %  Auto Monocyte % : 11.4 %  Auto Eosinophil % : 5.2 %  Auto Basophil % : 0.9 %    Urinalysis Basic - ( 03 Nov 2024 18:46 )    Color: x / Appearance: x / SG: x / pH: x  Gluc: 88 mg/dL / Ketone: x  / Bili: x / Urobili: x   Blood: x / Protein: x / Nitrite: x   Leuk Esterase: x / RBC: x / WBC x   Sq Epi: x / Non Sq Epi: x / Bacteria: x      11-03    140  |  107  |  15  ----------------------------<  88  3.8   |  27  |  0.72    Ca    8.6      03 Nov 2024 18:46    TPro  7.2  /  Alb  3.3  /  TBili  0.2  /  DBili  x   /  AST  28  /  ALT  38  /  AlkPhos  88  11-03    LIVER FUNCTIONS - ( 03 Nov 2024 18:46 )  Alb: 3.3 g/dL / Pro: 7.2 gm/dL / ALK PHOS: 88 U/L / ALT: 38 U/L / AST: 28 U/L / GGT: x           Hemoglobin A1C:             RADIOLOGY  < from: CT Lumbar Spine No Cont (05.09.24 @ 04:05) >  *  No acute fracture or traumatic malalignment.  *  Since the prior MRI of the lumbar spine dated 2/22/2023, the patient   has undergone posterior spine fusion at L4-S1 with bilateral pedicle   screws and rods and decompressive laminectomies. No evidence of hardware   fracture or malalignment. Streak artifact limitsevaluation.  *  MRI would be required to evaluate the ligamentous structures at higher   sensitivity as well as for better evaluation of the lumbar canal and its   contents.    --- End of Report ---    < end of copied text >

## 2024-11-05 NOTE — DISCHARGE NOTE PROVIDER - NSDCCPCAREPLAN_GEN_ALL_CORE_FT
PRINCIPAL DISCHARGE DIAGNOSIS  Diagnosis: Right leg weakness  Assessment and Plan of Treatment: CT of lumbar spine showed neural foraminal stenosis. Recommend outpatient follow up with neurology. Recommend outpatient EMG. Recommend outpateint MRI of brain, lumbar spine, and cervical spine.      SECONDARY DISCHARGE DIAGNOSES  Diagnosis: Pulmonary embolism  Assessment and Plan of Treatment: Continue Eliquis.     PRINCIPAL DISCHARGE DIAGNOSIS  Diagnosis: Right leg weakness  Assessment and Plan of Treatment: CT of lumbar spine showed neural foraminal stenosis. Recommend outpatient follow up with neurology. Recommend outpatient EMG. Recommend outpateint MRI of brain, lumbar spine, and cervical spine.      SECONDARY DISCHARGE DIAGNOSES  Diagnosis: Asthma  Assessment and Plan of Treatment: Continue home inhalers    Diagnosis: Parkinson disease  Assessment and Plan of Treatment: Continue Sinemet

## 2024-11-13 DIAGNOSIS — M48.061 SPINAL STENOSIS, LUMBAR REGION WITHOUT NEUROGENIC CLAUDICATION: ICD-10-CM

## 2024-11-13 DIAGNOSIS — I27.82 CHRONIC PULMONARY EMBOLISM: ICD-10-CM

## 2024-11-13 DIAGNOSIS — K21.9 GASTRO-ESOPHAGEAL REFLUX DISEASE WITHOUT ESOPHAGITIS: ICD-10-CM

## 2024-11-13 DIAGNOSIS — R53.1 WEAKNESS: ICD-10-CM

## 2024-11-13 DIAGNOSIS — G20.A1 PARKINSON'S DISEASE WITHOUT DYSKINESIA, WITHOUT MENTION OF FLUCTUATIONS: ICD-10-CM

## 2024-11-13 DIAGNOSIS — J45.909 UNSPECIFIED ASTHMA, UNCOMPLICATED: ICD-10-CM

## 2024-11-13 DIAGNOSIS — I48.91 UNSPECIFIED ATRIAL FIBRILLATION: ICD-10-CM

## 2024-12-19 NOTE — ED PROVIDER NOTE - DISCHARGE DATE
Thyroid levels are also significantly higher than before, please consult endocrinology inpatient.  Dr Rooney thanks for taking care of this patient. 07-Jun-2024

## 2025-09-15 ENCOUNTER — EMERGENCY (EMERGENCY)
Facility: HOSPITAL | Age: 70
LOS: 0 days | Discharge: HOME | End: 2025-09-15
Attending: EMERGENCY MEDICINE
Payer: MEDICARE

## 2025-09-15 VITALS
DIASTOLIC BLOOD PRESSURE: 90 MMHG | SYSTOLIC BLOOD PRESSURE: 154 MMHG | WEIGHT: 136.03 LBS | TEMPERATURE: 98 F | HEART RATE: 78 BPM | HEIGHT: 60 IN | OXYGEN SATURATION: 99 % | RESPIRATION RATE: 17 BRPM

## 2025-09-15 VITALS
DIASTOLIC BLOOD PRESSURE: 78 MMHG | HEART RATE: 76 BPM | OXYGEN SATURATION: 99 % | RESPIRATION RATE: 17 BRPM | SYSTOLIC BLOOD PRESSURE: 134 MMHG

## 2025-09-15 DIAGNOSIS — M25.551 PAIN IN RIGHT HIP: ICD-10-CM

## 2025-09-15 DIAGNOSIS — Z90.710 ACQUIRED ABSENCE OF BOTH CERVIX AND UTERUS: Chronic | ICD-10-CM

## 2025-09-15 DIAGNOSIS — G20.A1 PARKINSON'S DISEASE WITHOUT DYSKINESIA, WITHOUT MENTION OF FLUCTUATIONS: ICD-10-CM

## 2025-09-15 LAB
ANION GAP SERPL CALC-SCNC: 7 MMOL/L — SIGNIFICANT CHANGE UP (ref 5–17)
BASOPHILS # BLD AUTO: 0.08 K/UL — SIGNIFICANT CHANGE UP (ref 0–0.2)
BASOPHILS NFR BLD AUTO: 0.9 % — SIGNIFICANT CHANGE UP (ref 0–2)
BUN SERPL-MCNC: 7 MG/DL — SIGNIFICANT CHANGE UP (ref 7–23)
CALCIUM SERPL-MCNC: 9.3 MG/DL — SIGNIFICANT CHANGE UP (ref 8.5–10.1)
CHLORIDE SERPL-SCNC: 105 MMOL/L — SIGNIFICANT CHANGE UP (ref 96–108)
CO2 SERPL-SCNC: 25 MMOL/L — SIGNIFICANT CHANGE UP (ref 22–31)
CREAT SERPL-MCNC: 0.68 MG/DL — SIGNIFICANT CHANGE UP (ref 0.5–1.3)
EGFR: 94 ML/MIN/1.73M2 — SIGNIFICANT CHANGE UP
EGFR: 94 ML/MIN/1.73M2 — SIGNIFICANT CHANGE UP
EOSINOPHIL # BLD AUTO: 0.28 K/UL — SIGNIFICANT CHANGE UP (ref 0–0.5)
EOSINOPHIL NFR BLD AUTO: 3.2 % — SIGNIFICANT CHANGE UP (ref 0–6)
FLUAV AG NPH QL: SIGNIFICANT CHANGE UP
FLUBV AG NPH QL: SIGNIFICANT CHANGE UP
GLUCOSE SERPL-MCNC: 108 MG/DL — HIGH (ref 70–99)
HCT VFR BLD CALC: 45.2 % — HIGH (ref 34.5–45)
HGB BLD-MCNC: 15 G/DL — SIGNIFICANT CHANGE UP (ref 11.5–15.5)
IMM GRANULOCYTES NFR BLD AUTO: 0.2 % — SIGNIFICANT CHANGE UP (ref 0–0.9)
LYMPHOCYTES # BLD AUTO: 3.22 K/UL — SIGNIFICANT CHANGE UP (ref 1–3.3)
LYMPHOCYTES # BLD AUTO: 36.5 % — SIGNIFICANT CHANGE UP (ref 13–44)
MAGNESIUM SERPL-MCNC: 2.5 MG/DL — SIGNIFICANT CHANGE UP (ref 1.6–2.6)
MCHC RBC-ENTMCNC: 31.6 PG — SIGNIFICANT CHANGE UP (ref 27–34)
MCHC RBC-ENTMCNC: 33.2 G/DL — SIGNIFICANT CHANGE UP (ref 32–36)
MCV RBC AUTO: 95.4 FL — SIGNIFICANT CHANGE UP (ref 80–100)
MONOCYTES # BLD AUTO: 0.77 K/UL — SIGNIFICANT CHANGE UP (ref 0–0.9)
MONOCYTES NFR BLD AUTO: 8.7 % — SIGNIFICANT CHANGE UP (ref 2–14)
NEUTROPHILS # BLD AUTO: 4.45 K/UL — SIGNIFICANT CHANGE UP (ref 1.8–7.4)
NEUTROPHILS NFR BLD AUTO: 50.5 % — SIGNIFICANT CHANGE UP (ref 43–77)
NRBC BLD AUTO-RTO: 0 /100 WBCS — SIGNIFICANT CHANGE UP (ref 0–0)
PLATELET # BLD AUTO: 276 K/UL — SIGNIFICANT CHANGE UP (ref 150–400)
POTASSIUM SERPL-MCNC: 4.2 MMOL/L — SIGNIFICANT CHANGE UP (ref 3.5–5.3)
POTASSIUM SERPL-SCNC: 4.2 MMOL/L — SIGNIFICANT CHANGE UP (ref 3.5–5.3)
RBC # BLD: 4.74 M/UL — SIGNIFICANT CHANGE UP (ref 3.8–5.2)
RBC # FLD: 12.5 % — SIGNIFICANT CHANGE UP (ref 10.3–14.5)
RSV RNA NPH QL NAA+NON-PROBE: SIGNIFICANT CHANGE UP
SARS-COV-2 RNA SPEC QL NAA+PROBE: SIGNIFICANT CHANGE UP
SODIUM SERPL-SCNC: 137 MMOL/L — SIGNIFICANT CHANGE UP (ref 135–145)
SOURCE RESPIRATORY: SIGNIFICANT CHANGE UP
TROPONIN I, HIGH SENSITIVITY RESULT: 39.5 NG/L — SIGNIFICANT CHANGE UP
WBC # BLD: 8.82 K/UL — SIGNIFICANT CHANGE UP (ref 3.8–10.5)
WBC # FLD AUTO: 8.82 K/UL — SIGNIFICANT CHANGE UP (ref 3.8–10.5)

## 2025-09-15 PROCEDURE — 70450 CT HEAD/BRAIN W/O DYE: CPT | Mod: 26

## 2025-09-15 PROCEDURE — 93010 ELECTROCARDIOGRAM REPORT: CPT

## 2025-09-15 PROCEDURE — 73501 X-RAY EXAM HIP UNI 1 VIEW: CPT | Mod: 26,RT

## 2025-09-15 PROCEDURE — 71045 X-RAY EXAM CHEST 1 VIEW: CPT | Mod: 26

## 2025-09-15 PROCEDURE — 99053 MED SERV 10PM-8AM 24 HR FAC: CPT

## 2025-09-15 PROCEDURE — 99285 EMERGENCY DEPT VISIT HI MDM: CPT

## 2025-09-15 RX ORDER — ACETAMINOPHEN 500 MG/5ML
1000 LIQUID (ML) ORAL ONCE
Refills: 0 | Status: COMPLETED | OUTPATIENT
Start: 2025-09-15 | End: 2025-09-15

## 2025-09-15 RX ADMIN — Medication 400 MILLIGRAM(S): at 08:20

## (undated) DEVICE — STRYKER BONE MILL BLADE FINE 3.2MM

## (undated) DEVICE — DRSG STERISTRIPS 0.5 X 4"

## (undated) DEVICE — SPONGE PEANUT AUTO COUNT

## (undated) DEVICE — FRA-ESU BOVIE FORCE TRIAD T0E42286E: Type: DURABLE MEDICAL EQUIPMENT

## (undated) DEVICE — DRAPE IOBAN 33" X 23"

## (undated) DEVICE — FRAZIER SUCTION TIP 10FR

## (undated) DEVICE — DRSG TEGADERM 2.5X3"

## (undated) DEVICE — SOL IRR POUR NS 0.9% 1000ML

## (undated) DEVICE — DRAIN JACKSON PRATT 7MM FLAT FULL NO TROCAR

## (undated) DEVICE — SUT VICRYL 0 18" CT-1 UNDYED (POP-OFF)

## (undated) DEVICE — VENODYNE/SCD SLEEVE CALF MEDIUM

## (undated) DEVICE — DRSG AQUACEL 3.5 X 10"

## (undated) DEVICE — SUT SOFSILK 3-0 18" P-14

## (undated) DEVICE — FOLEY TRAY 14FR 5CC LF BAG CLOSED

## (undated) DEVICE — SUT VICRYL 1 18" CT-1 UNDYED (POP-OFF)

## (undated) DEVICE — MARKING PEN W RULER

## (undated) DEVICE — TAP RELINEO SOLID 4.5MM

## (undated) DEVICE — ELCTR BOVIE TIP BLADE INSULATED 2.75" EDGE WITH SAFETY

## (undated) DEVICE — PACK BASIC

## (undated) DEVICE — MISONIX BONESCALPEL BLUNT BLADE & TUBESET 20MM

## (undated) DEVICE — PACK POSTERIOR SPINAL FUSION

## (undated) DEVICE — SUT SILK 3-0 18" TIES

## (undated) DEVICE — GLV 8 PROTEXIS (WHITE)

## (undated) DEVICE — SUT VICRYL 2-0 27" SH UNDYED

## (undated) DEVICE — WARMING BLANKET LOWER ADULT

## (undated) DEVICE — DRAIN RESERVOIR FOR JACKSON PRATT 100CC CARDINAL

## (undated) DEVICE — DRAPE IOBAN 23" X 17"

## (undated) DEVICE — SUT MONOCRYL 4-0 27" PS-2 UNDYED

## (undated) DEVICE — SUT HISTOACRYL BLUE

## (undated) DEVICE — DRAPE C ARM 41X140"

## (undated) DEVICE — SUT VICRYL 1 27" CT-1 UNDYED

## (undated) DEVICE — SYR LUER LOK 10CC

## (undated) DEVICE — SUT VICRYL 2-0 18" CP-2 UNDYED (POP-OFF)

## (undated) DEVICE — PREP DURAPREP 6CC

## (undated) DEVICE — DRSG BIOPATCH DISK W CHG 1" W 4.0MM HOLE

## (undated) DEVICE — PACK CERVICAL FUSION

## (undated) DEVICE — DRAPE C ARM C-ARMOUR

## (undated) DEVICE — MIDAS REX LEGEND LUBRICANT DIFFUSER CARTRIDGE

## (undated) DEVICE — FRA-ESU BOVIE FORCE TRIAD T7J19717DX: Type: DURABLE MEDICAL EQUIPMENT

## (undated) DEVICE — MIDAS REX LEGEND MATCH HEAD FLUTED LG BORE 3.0MM X 14CM

## (undated) DEVICE — DRAPE SURGICAL #1010